# Patient Record
Sex: FEMALE | Race: WHITE | NOT HISPANIC OR LATINO | Employment: FULL TIME
[De-identification: names, ages, dates, MRNs, and addresses within clinical notes are randomized per-mention and may not be internally consistent; named-entity substitution may affect disease eponyms.]

---

## 2018-01-07 ENCOUNTER — HEALTH MAINTENANCE LETTER (OUTPATIENT)
Age: 53
End: 2018-01-07

## 2019-08-16 ENCOUNTER — HOSPITAL ENCOUNTER (EMERGENCY)
Facility: CLINIC | Age: 54
Discharge: HOME | End: 2019-08-16
Attending: EMERGENCY MEDICINE | Admitting: EMERGENCY MEDICINE
Payer: COMMERCIAL

## 2019-08-16 VITALS
HEIGHT: 64 IN | SYSTOLIC BLOOD PRESSURE: 133 MMHG | BODY MASS INDEX: 24.75 KG/M2 | TEMPERATURE: 98.4 F | HEART RATE: 85 BPM | OXYGEN SATURATION: 100 % | DIASTOLIC BLOOD PRESSURE: 82 MMHG | WEIGHT: 145 LBS | RESPIRATION RATE: 14 BRPM

## 2019-08-16 DIAGNOSIS — G43.801 OTHER MIGRAINE WITH STATUS MIGRAINOSUS, NOT INTRACTABLE: ICD-10-CM

## 2019-08-16 PROCEDURE — 25000128 H RX IP 250 OP 636: Performed by: EMERGENCY MEDICINE

## 2019-08-16 PROCEDURE — 96375 TX/PRO/DX INJ NEW DRUG ADDON: CPT

## 2019-08-16 PROCEDURE — 96361 HYDRATE IV INFUSION ADD-ON: CPT

## 2019-08-16 PROCEDURE — 99284 EMERGENCY DEPT VISIT MOD MDM: CPT | Mod: 25

## 2019-08-16 PROCEDURE — 96374 THER/PROPH/DIAG INJ IV PUSH: CPT

## 2019-08-16 RX ORDER — DEXAMETHASONE SODIUM PHOSPHATE 10 MG/ML
10 INJECTION, SOLUTION INTRAMUSCULAR; INTRAVENOUS ONCE
Status: COMPLETED | OUTPATIENT
Start: 2019-08-16 | End: 2019-08-16

## 2019-08-16 RX ORDER — DIPHENHYDRAMINE HYDROCHLORIDE 50 MG/ML
25 INJECTION INTRAMUSCULAR; INTRAVENOUS ONCE
Status: COMPLETED | OUTPATIENT
Start: 2019-08-16 | End: 2019-08-16

## 2019-08-16 RX ORDER — METOCLOPRAMIDE HYDROCHLORIDE 5 MG/ML
10 INJECTION INTRAMUSCULAR; INTRAVENOUS ONCE
Status: COMPLETED | OUTPATIENT
Start: 2019-08-16 | End: 2019-08-16

## 2019-08-16 RX ORDER — KETOROLAC TROMETHAMINE 15 MG/ML
15 INJECTION, SOLUTION INTRAMUSCULAR; INTRAVENOUS ONCE
Status: COMPLETED | OUTPATIENT
Start: 2019-08-16 | End: 2019-08-16

## 2019-08-16 RX ADMIN — DEXAMETHASONE SODIUM PHOSPHATE 10 MG: 10 INJECTION, SOLUTION INTRAMUSCULAR; INTRAVENOUS at 15:13

## 2019-08-16 RX ADMIN — METOCLOPRAMIDE 10 MG: 5 INJECTION, SOLUTION INTRAMUSCULAR; INTRAVENOUS at 15:13

## 2019-08-16 RX ADMIN — DIPHENHYDRAMINE HYDROCHLORIDE 25 MG: 50 INJECTION, SOLUTION INTRAMUSCULAR; INTRAVENOUS at 15:13

## 2019-08-16 RX ADMIN — SODIUM CHLORIDE 1000 ML: 9 INJECTION, SOLUTION INTRAVENOUS at 15:00

## 2019-08-16 RX ADMIN — KETOROLAC TROMETHAMINE 15 MG: 15 INJECTION, SOLUTION INTRAMUSCULAR; INTRAVENOUS at 15:13

## 2019-08-16 ASSESSMENT — ENCOUNTER SYMPTOMS
PHOTOPHOBIA: 0
FEVER: 0
HEADACHES: 1

## 2019-08-16 ASSESSMENT — MIFFLIN-ST. JEOR: SCORE: 1252.72

## 2019-08-16 NOTE — ED TRIAGE NOTES
"Patient reports she is \"having vertigo from a vestibular migraine.\" Patient states normally she can catch them early and lay down but she was at work when this started.   "

## 2019-08-16 NOTE — ED PROVIDER NOTES
"  History     Chief Complaint:  Headache and dizziness    HPI   Oral Singh is a 54 year old female, with a history of vestibular migraine, who presents with headache. The patient states that she had developed a migraine today while at work, but was unable to lay down, which usually resolves her headache, prompting her ED visit. Here, the patient endorses associated nausea, but denies any fever, photophobia or phonophobia, or changes in the presentation of her migraine. She states this is the exact same presentation as her previous migraines.      Allergies:  Ciprofloxacin  Sulfamethoxazole-Trimethoprim      Medications:    Meclizine   Estradiol cream   Amitriptyline  Ativan    Past Medical History:    Arthritis  Migraine  Vertigo  Condyloma acuminatum   Mitral valve prolapse  NIC elevated  Vulvar vestibulitis  Menorrhagia   Peptic ulcer   IBS    Past Surgical History:    GYN surgery   Knee surgery  [place] teeth extraction    Family History:    Diabetes  Hypertension  Asthma    Social History:  Positive for alcohol use.   Negative for tobacco use.   Marital Status:   [2]     Review of Systems   Constitutional: Negative for fever.   Eyes: Negative for photophobia.   Neurological: Positive for headaches.   All other systems reviewed and are negative.      Physical Exam   First Vitals:  BP: 133/82  Pulse: 85  Heart Rate: 85  Temp: 98.4  F (36.9  C)  Resp: 14  Height: 162.6 cm (5' 4\")  Weight: 65.8 kg (145 lb)  SpO2: 100 %    Physical Exam  General/Appearance: appears stated age, well-groomed, appears comfortable  Eyes: EOMI, no scleral injection, no icterus, horizontal nystagmus bilaterally  ENT: MMM  Neck: supple, nl ROM, no stiffness  Cardiovascular: RRR, nl S1S2, no m/r/g, 2+ pulses in all 4 extremities, cap refill <2sec  Respiratory: CTAB, good air movement throughout, no wheezes/rhonchi/rales, no increased WOB, no retractions  Back: no lesions  GI: abd soft, non-distended, nttp,  no HSM, no rebound, no " guarding, nl BS  MSK: PIZANO, good tone, no bony abnormality  Skin: warm and well-perfused, no rash, no edema, no ecchymosis, nl turgor  Neuro: GCS 15, alert and oriented, no gross focal neuro deficits -- CN 2-12 intact except for horizontal nystagmus, no other focal neuro deficit  Psych: interacts appropriately  Heme: no petechia, no purpura, no active bleeding      Emergency Department Course   Interventions:  1500 0.9% Sodium Chloride Bolus, 1L, IV   1513 Decadron, 10 mg, IV  1513 Reglan, 10 mg, IV  1513 Benadryl, 25 mg, IV  1513 Toradol, 15 mg, IV      Emergency Department Course:  Nursing notes and vitals reviewed.     1448  I performed an exam of the patient as documented above.     IV inserted. Medicine administered as documented above.     1612 I rechecked the patient and discussed the results of his workup thus far.     Findings and plan explained to the Patient. Patient discharged home with instructions regarding supportive care, medications, and reasons to return. The importance of close follow-up was reviewed.    Impression & Plan      Medical Decision Making:  This patient is a 54-year-old female with history of vestibular migraines who presents with the same.  There is nothing new or different about her presentation today.  She does have bilateral horizontal nystagmus.  Otherwise she neurologically is intact.  There is no recent head trauma to suggest bleed.  There is no fever, nuchal rigidity to suggest infection.  With the IV cocktail she received she is feeling markedly better and ready for discharge.  Diagnosis:    ICD-10-CM   1. Other migraine with status migrainosus, not intractable G43.801       Disposition:  discharged to home    IRenetta, am serving as a scribe on 8/16/2019 at 2:48 PM to personally document services performed by Lakia Jamil MD based on my observations and the provider's statements to me.      Renetta Duran  8/16/2019    EMERGENCY DEPARTMENT        Lakia Jamil MD  08/16/19 3180

## 2019-08-16 NOTE — ED AVS SNAPSHOT
Emergency Department  [address] [place] [address]  Phone:  [phone]  Fax:  [phone]                                    Oral Singh   MRN: [id number]    Department:   Emergency Department   Date of Visit:  8/16/2019           After Visit Summary Signature Page    I have received my discharge instructions, and my questions have been answered. I have discussed any challenges I see with this plan with the nurse or doctor.    ..........................................................................................................................................  Patient/Patient Representative Signature      ..........................................................................................................................................  Patient Representative Print Name and Relationship to Patient    ..................................................               ................................................  Date                                   Time    ..........................................................................................................................................  Reviewed by Signature/Title    ...................................................              ..............................................  Date                                               Time          22EPIC Rev 08/18

## 2019-09-13 ENCOUNTER — HOSPITAL ENCOUNTER (EMERGENCY)
Facility: CLINIC | Age: 54
Discharge: HOME | End: 2019-09-13
Attending: EMERGENCY MEDICINE | Admitting: EMERGENCY MEDICINE
Payer: COMMERCIAL

## 2019-09-13 VITALS
RESPIRATION RATE: 14 BRPM | TEMPERATURE: 97.9 F | BODY MASS INDEX: 25.44 KG/M2 | WEIGHT: 149 LBS | HEART RATE: 84 BPM | DIASTOLIC BLOOD PRESSURE: 85 MMHG | HEIGHT: 64 IN | SYSTOLIC BLOOD PRESSURE: 141 MMHG | OXYGEN SATURATION: 100 %

## 2019-09-13 DIAGNOSIS — H55.00 NYSTAGMUS: ICD-10-CM

## 2019-09-13 DIAGNOSIS — Z86.69 H/O MIGRAINE: ICD-10-CM

## 2019-09-13 DIAGNOSIS — R42 VERTIGO: ICD-10-CM

## 2019-09-13 PROCEDURE — 99284 EMERGENCY DEPT VISIT MOD MDM: CPT | Mod: 25

## 2019-09-13 PROCEDURE — 25000128 H RX IP 250 OP 636: Performed by: EMERGENCY MEDICINE

## 2019-09-13 PROCEDURE — 96374 THER/PROPH/DIAG INJ IV PUSH: CPT

## 2019-09-13 PROCEDURE — 96375 TX/PRO/DX INJ NEW DRUG ADDON: CPT

## 2019-09-13 PROCEDURE — 96361 HYDRATE IV INFUSION ADD-ON: CPT

## 2019-09-13 RX ORDER — DIPHENHYDRAMINE HYDROCHLORIDE 50 MG/ML
50 INJECTION INTRAMUSCULAR; INTRAVENOUS ONCE
Status: COMPLETED | OUTPATIENT
Start: 2019-09-13 | End: 2019-09-13

## 2019-09-13 RX ORDER — KETOROLAC TROMETHAMINE 30 MG/ML
30 INJECTION, SOLUTION INTRAMUSCULAR; INTRAVENOUS ONCE
Status: COMPLETED | OUTPATIENT
Start: 2019-09-13 | End: 2019-09-13

## 2019-09-13 RX ORDER — METOCLOPRAMIDE HYDROCHLORIDE 5 MG/ML
10 INJECTION INTRAMUSCULAR; INTRAVENOUS ONCE
Status: COMPLETED | OUTPATIENT
Start: 2019-09-13 | End: 2019-09-13

## 2019-09-13 RX ORDER — DEXAMETHASONE SODIUM PHOSPHATE 10 MG/ML
10 INJECTION, SOLUTION INTRAMUSCULAR; INTRAVENOUS ONCE
Status: COMPLETED | OUTPATIENT
Start: 2019-09-13 | End: 2019-09-13

## 2019-09-13 RX ADMIN — KETOROLAC TROMETHAMINE 30 MG: 30 INJECTION, SOLUTION INTRAMUSCULAR at 12:25

## 2019-09-13 RX ADMIN — METOCLOPRAMIDE 10 MG: 5 INJECTION, SOLUTION INTRAMUSCULAR; INTRAVENOUS at 12:25

## 2019-09-13 RX ADMIN — SODIUM CHLORIDE 1000 ML: 9 INJECTION, SOLUTION INTRAVENOUS at 12:26

## 2019-09-13 RX ADMIN — DEXAMETHASONE SODIUM PHOSPHATE 10 MG: 10 INJECTION, SOLUTION INTRAMUSCULAR; INTRAVENOUS at 12:25

## 2019-09-13 RX ADMIN — DIPHENHYDRAMINE HYDROCHLORIDE 50 MG: 50 INJECTION, SOLUTION INTRAMUSCULAR; INTRAVENOUS at 12:25

## 2019-09-13 ASSESSMENT — ENCOUNTER SYMPTOMS
VOMITING: 1
SHORTNESS OF BREATH: 0
HEADACHES: 0
DIZZINESS: 1

## 2019-09-13 ASSESSMENT — MIFFLIN-ST. JEOR: SCORE: 1270.86

## 2019-09-13 NOTE — ED PROVIDER NOTES
History     Chief Complaint:    Dizziness    HPI   Oral Singh is a 54 year old female who presents with dizziness she feels is consistent with her typical vestibular migraine. The patient reports that for the past 5 years she has been experiencing intermittent, random vestibular migraines that she describes as vertigo symptoms with dizziness and nystagmus. She notes that she does not get a headache with her symptoms ever and has been following [facility] of which she has had several reassuring MRI's in the past few year, one most recently this summer. In the past 1-2 months she has been previously evaluated in the emergency department for episodes and she just finished a tapering course of Prednisone. The patient has been in follow up and contact with the [facility]. The patient states that today she is presenting for another episode that is uncontrolled with at home medications. Last night she took Excedrin as well as Meclizine. She continued to take Meclizine today, however, had an episode of emesis shortly after taking it 1 hour prior to arrival. The patient denies loss of vision, shortness of breath, chest pain, or any new symptoms that are atypical from her normal episodes.     Allergies:  Ciprofloxacin  Sulfamethoxazole-Trimethoprim      Medications:    Meczline  Estrace Vaginal     Past Medical History:    Vestibular migraines   Vertigo   Left ear deafness   osteoarthrosis   Murmur  Mitral valve prolapse  Menorrhagia   Peptic ulcer  IBS  Left thyroid nodule    Past Surgical History:    GYN surgery- uterine ablation   Knee arthroscopy  [place] teeth     Family History:    Family history reviewed. No pertinent family history.     Social History:  The patient was unaccompanied to the ED.  Smoking Status: Never Smoker  Smokeless Tobacco: Never Used  Alcohol Use: Positive  Drug Use: Negative  PCP: Brock Ortega   Marital Status:        Review of Systems   Eyes: Negative for visual  "disturbance.        Nystagmus   Respiratory: Negative for shortness of breath.    Cardiovascular: Negative for chest pain.   Gastrointestinal: Positive for vomiting.   Neurological: Positive for dizziness. Negative for headaches.   All other systems reviewed and are negative.    Physical Exam     Patient Vitals for the past 24 hrs:   BP Temp Temp src Pulse Heart Rate Resp SpO2 Height Weight   09/13/19 1300 (!) 141/85 -- -- 84 78 14 100 % -- --   09/13/19 1150 (!) 143/89 97.9  F (36.6  C) Temporal 91 -- 20 100 % 1.626 m (5' 4\") 67.6 kg (149 lb)      Physical Exam  General:Adult female sitting upright  Eyes: PERRL, Conjunctive within normal limits. EOMI. Horizontal nystagmus.  ENT: Moist mucous membranes, oropharynx clear.   Neck: no rigidity. Holds head cocked to the right. Nontender.  CV: Normal S1S2, no murmur, rub or gallop. Regular rate and rhythm  Resp: Clear to auscultation bilaterally. Normal respiratory effort.  GI: Abdomen is soft, nontender and nondistended.   MSK: No edema. Nontender. Normal active range of motion.  Skin: Warm and dry. No rashes or lesions or ecchymoses on visible skin.  Neuro: Alert and oriented. Responds appropriately to all questions and commands. No focal findings appreciated. Normal muscle tone. Hearing intact right ear (deaf on left). No facial asymmetry. Moves all extremities equally. Speech intact.   Psych: Normal mood and affect.    Emergency Department Course     Interventions:  Medications   0.9% sodium chloride BOLUS (1,000 mLs Intravenous New Bag 9/13/19 1226)   diphenhydrAMINE (BENADRYL) injection 50 mg (50 mg Intravenous Given 9/13/19 1225)   dexamethasone PF (DECADRON) injection 10 mg (10 mg Intravenous Given 9/13/19 1225)   ketorolac (TORADOL) injection 30 mg (30 mg Intravenous Given 9/13/19 1225)   metoclopramide (REGLAN) injection 10 mg (10 mg Intravenous Given 9/13/19 1225)      Emergency Department Course:    1206 Nursing notes and vitals reviewed. I performed an exam " of the patient as documented above.     Medications administered as above.    1316 Patient rechecked and updated.  The patient feels improved and is ready to go home. Prior to discharge, I personally reviewed the results with the patient and all related questions were answered. The patient verbalized understanding and is amenable to plan.     Impression & Plan      Medical Decision Making:  Oral Singh is a 54 year old female with a history of vestibular migraines managed by [facility] who presents to the emergency department today for evaluation of symptoms consistent with her previous vestibular migraines. She has no neurologic deficit. She has noted nystagmus here. She has no associated headache which is typical for her vestibular migraines. She felt better after intervention here and I feel comfortable discharging her to home. We seem unlikely to be missing acute CNS pathology. She should return if symptoms worsen or if atypical symptoms develop. Follow up with [facility]. All questions were answered prior to discharge.     Diagnosis:    ICD-10-CM    1. Vertigo R42    2. Nystagmus H55.00    3. H/O migraine Z86.69      Disposition:   The patient is discharged to home.     Discharge Medications:    No discharge medications.    Scribe Disclosure:  I, Orla Severson, am serving as a scribe at 12:09 PM on 9/13/2019 to document services personally performed by Dayana Jaime MD based on my observations and the provider's statements to me.    [facility] EMERGENCY DEPARTMENT       Dayana Jaime MD  09/14/19 0791

## 2019-09-13 NOTE — DISCHARGE INSTRUCTIONS
Based on your history, this seems most consistent with vestibular migraine. Return with worsening or atypical symptoms. Follow-up with your neurologist.

## 2019-09-13 NOTE — ED NOTES
PIV removed, VSS, Pt verbalized understanding of discharge instructions and ambulated to lobby with steady gait.

## 2019-09-13 NOTE — ED AVS SNAPSHOT
[facility] Emergency Department  [address]  [facility] [address]  Phone:  [phone]  Fax:  [phone]                                    Oral Singh   MRN: [id number]    Department:  [facility] Emergency Department   Date of Visit:  9/13/2019           After Visit Summary Signature Page    I have received my discharge instructions, and my questions have been answered. I have discussed any challenges I see with this plan with the nurse or doctor.    ..........................................................................................................................................  Patient/Patient Representative Signature      ..........................................................................................................................................  Patient Representative Print Name and Relationship to Patient    ..................................................               ................................................  Date                                   Time    ..........................................................................................................................................  Reviewed by Signature/Title    ...................................................              ..............................................  Date                                               Time          22EPIC Rev 08/18

## 2019-09-13 NOTE — ED TRIAGE NOTES
Patient complaining of migraine headache for one month.  States she has been to ED three times and [facility] clinic once with no relief.      States they are vestibular migraines so no pain only severe dizziness.      ABCs intact.  Alert and oriented x 3.

## 2019-09-13 NOTE — LETTER
September 13, 2019      To Whom It May Concern:      Oral Singh was seen in our Emergency Department today, 09/13/19.  I expect her condition to improve over the next 1-2 days.  She may return to work when improved.    Sincerely,        Renetta Hood RN

## 2020-03-10 ENCOUNTER — HEALTH MAINTENANCE LETTER (OUTPATIENT)
Age: 55
End: 2020-03-10

## 2020-05-04 ENCOUNTER — TRANSFERRED RECORDS (OUTPATIENT)
Dept: HEALTH INFORMATION MANAGEMENT | Facility: CLINIC | Age: 55
End: 2020-05-04

## 2020-05-05 LAB
ALT SERPL-CCNC: 15 U/L (ref 7–45)
AST SERPL-CCNC: 16 U/L (ref 8–43)
CREAT SERPL-MCNC: 0.98 MG/DL (ref 0.5–1)
GFR SERPL CREATININE-BSD FRML MDRD: 67 ML/MIN/BSA
GLUCOSE SERPL-MCNC: 95 MG/DL (ref 70–100)
POTASSIUM SERPL-SCNC: 4.5 MMOL/L (ref 3.6–5.2)
TSH SERPL-ACNC: 4.9 MIU/L (ref 0.3–4.2)

## 2020-09-30 ENCOUNTER — TRANSFERRED RECORDS (OUTPATIENT)
Dept: HEALTH INFORMATION MANAGEMENT | Facility: CLINIC | Age: 55
End: 2020-09-30

## 2020-11-17 ENCOUNTER — TRANSFERRED RECORDS (OUTPATIENT)
Dept: HEALTH INFORMATION MANAGEMENT | Facility: CLINIC | Age: 55
End: 2020-11-17

## 2020-11-19 ENCOUNTER — TRANSFERRED RECORDS (OUTPATIENT)
Dept: HEALTH INFORMATION MANAGEMENT | Facility: CLINIC | Age: 55
End: 2020-11-19

## 2020-12-27 ENCOUNTER — HEALTH MAINTENANCE LETTER (OUTPATIENT)
Age: 55
End: 2020-12-27

## 2021-01-07 ENCOUNTER — TRANSFERRED RECORDS (OUTPATIENT)
Dept: HEALTH INFORMATION MANAGEMENT | Facility: CLINIC | Age: 56
End: 2021-01-07

## 2021-03-16 ENCOUNTER — DOCUMENTATION ONLY (OUTPATIENT)
Dept: CARE COORDINATION | Facility: CLINIC | Age: 56
End: 2021-03-16

## 2021-04-04 ENCOUNTER — PRE VISIT (OUTPATIENT)
Dept: NEUROLOGY | Facility: CLINIC | Age: 56
End: 2021-04-04

## 2021-04-04 ASSESSMENT — ENCOUNTER SYMPTOMS
HEADACHES: 1
TREMORS: 0
SWOLLEN GLANDS: 1
TROUBLE SWALLOWING: 0
MYALGIAS: 1
NECK PAIN: 1
MEMORY LOSS: 1
HOARSE VOICE: 0
SINUS PAIN: 0
STIFFNESS: 1
DIZZINESS: 1
NECK MASS: 1
BRUISES/BLEEDS EASILY: 0
MUSCLE CRAMPS: 0
TINGLING: 0
MUSCLE WEAKNESS: 1
SMELL DISTURBANCE: 0
NUMBNESS: 0
SINUS CONGESTION: 0
SEIZURES: 0
PARALYSIS: 0
DISTURBANCES IN COORDINATION: 0
LOSS OF CONSCIOUSNESS: 1
TASTE DISTURBANCE: 0
WEAKNESS: 1
SPEECH CHANGE: 1
BACK PAIN: 1
JOINT SWELLING: 0
ARTHRALGIAS: 1
SORE THROAT: 0

## 2021-04-04 NOTE — TELEPHONE ENCOUNTER
FUTURE VISIT INFORMATION      FUTURE VISIT INFORMATION:    Date: 4/7/2021    Time: 230pm    Location: [facility]  REFERRAL INFORMATION:    Referring provider:  Self     Referring providers clinic:      Reason for visit/diagnosis  Vertigo    RECORDS REQUESTED FROM:       Clinic name Comments Records Status Imaging Status   Internal ED Visit-9/13/2019 Epic N/A          [facility]  ED Visit-8/22/2019 Care Everywhere N/A         [facility]-9/30/2020    Dr. Avelar-10/7/2020    Dr. Goddard-5/22/2020    Dr. Madrid-5/4/2020 Care EVerywhere Requested to PACS            4/4/2021-Request for images faxed to [facility]-MR @ 761nm    4/7/2021-[facility] images now in PACS-MR @ 847am

## 2021-04-07 ENCOUNTER — OFFICE VISIT (OUTPATIENT)
Dept: NEUROLOGY | Facility: CLINIC | Age: 56
End: 2021-04-07
Payer: COMMERCIAL

## 2021-04-07 VITALS
BODY MASS INDEX: 27.49 KG/M2 | RESPIRATION RATE: 16 BRPM | OXYGEN SATURATION: 99 % | DIASTOLIC BLOOD PRESSURE: 89 MMHG | HEART RATE: 95 BPM | SYSTOLIC BLOOD PRESSURE: 128 MMHG | WEIGHT: 161 LBS | HEIGHT: 64 IN

## 2021-04-07 DIAGNOSIS — G54.0 TOS (THORACIC OUTLET SYNDROME): ICD-10-CM

## 2021-04-07 DIAGNOSIS — H81.02 ENDOLYMPHATIC HYDROPS OF LEFT EAR: ICD-10-CM

## 2021-04-07 DIAGNOSIS — R42 VERTIGO: Primary | ICD-10-CM

## 2021-04-07 DIAGNOSIS — I87.1 COMPRESSION OF VEIN: ICD-10-CM

## 2021-04-07 PROCEDURE — 99205 OFFICE O/P NEW HI 60 MIN: CPT | Performed by: PSYCHIATRY & NEUROLOGY

## 2021-04-07 RX ORDER — PROMETHAZINE HYDROCHLORIDE 25 MG/1
TABLET ORAL
Qty: 30 TABLET | Refills: 3 | Status: SHIPPED | OUTPATIENT
Start: 2021-04-07 | End: 2022-11-18

## 2021-04-07 RX ORDER — TOPIRAMATE SPINKLE 25 MG/1
50 CAPSULE ORAL 2 TIMES DAILY
COMMUNITY
End: 2022-11-18

## 2021-04-07 RX ORDER — ACETAMINOPHEN 500 MG
500 TABLET ORAL
COMMUNITY
Start: 2019-10-31 | End: 2022-09-23

## 2021-04-07 RX ORDER — TOPIRAMATE 25 MG/1
TABLET, FILM COATED ORAL
Qty: 120 TABLET | Refills: 3 | Status: SHIPPED | OUTPATIENT
Start: 2021-04-07 | End: 2021-04-07

## 2021-04-07 RX ORDER — SUMATRIPTAN 50 MG/1
TABLET, FILM COATED ORAL PRN
COMMUNITY
Start: 2016-12-23 | End: 2023-10-27

## 2021-04-07 RX ORDER — ONDANSETRON 4 MG/1
4 TABLET, ORALLY DISINTEGRATING ORAL
COMMUNITY
Start: 2019-10-31 | End: 2022-11-18

## 2021-04-07 RX ORDER — CHLORAL HYDRATE 500 MG
2 CAPSULE ORAL
COMMUNITY
Start: 2018-01-01

## 2021-04-07 RX ORDER — BACLOFEN 20 MG
500 TABLET ORAL
COMMUNITY
Start: 2016-12-22 | End: 2022-11-18

## 2021-04-07 RX ORDER — VIT C/B6/B5/MAGNESIUM/HERB 173 50-5-6-5MG
CAPSULE ORAL
COMMUNITY
End: 2023-10-19

## 2021-04-07 RX ORDER — NAPROXEN SODIUM 220 MG
220 TABLET ORAL PRN
COMMUNITY
Start: 2019-10-31 | End: 2022-05-09

## 2021-04-07 RX ORDER — CYCLOBENZAPRINE HCL 5 MG
TABLET ORAL
COMMUNITY
Start: 2020-02-18 | End: 2022-05-09

## 2021-04-07 ASSESSMENT — MIFFLIN-ST. JEOR: SCORE: 1320.29

## 2021-04-07 ASSESSMENT — PAIN SCALES - GENERAL: PAINLEVEL: NO PAIN (0)

## 2021-04-07 NOTE — Clinical Note
4/7/2021       RE: Oral Singh  [address] [place] [address]     Dear Colleague,    Thank you for referring your patient, Oral Singh, to the [facility]. Please see a copy of my visit note below.    No notes on file    Again, thank you for allowing me to participate in the care of your patient.      Sincerely,    Toshia CAMARILLO Cha, MD

## 2021-04-07 NOTE — NURSING NOTE
Chief Complaint   Patient presents with     Consult     [facility] New     Dizziness     Vertigo     Cl Dotson

## 2021-04-07 NOTE — LETTER
"4/7/2021       RE: Oral Singh  [address] [place] [address]     Dear Colleague,    Thank you for referring your patient, Oral Singh, to the [facility] [place] [facility]. Please see a copy of my visit note below.    [facility]    Neurology Consult    4/7/2021      Oral Singh MRN# [id number]   YOB: 1965 Age: 55 year old      Primary care provider:   [facility] [place]    Requesting provider:   None    Reason for Consult:  Episodic vertigo and chronic dizziness    IMPRESSIONS:  Oral Singh is a 55 year old female with PMH significant for left ear deafness with episodes of vertigo with now head triggered symptoms of lightheadedness and \"whooshing,\" sensations.  Her rotational vertigo episodes seem to have been controlled with topiramate but she has a chronic sensation that her vertigo episode might start.  She also has chronic daily pressure headaches and neck pain.  She has chronic bilateral upper extremity weakness.  There is some chronic swelling in the hands but also in the feet more recently.  Paresthesias in the hand and the headache have been worse on the right side by history but on exam today paresthesias are worse in the left hand.  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  If involving the venous system this can also lead to chronic neck pain and headache.  With the head movement triggered symptoms I would also be concerned about internal jugular vein narrowing.      For diagnostic testing we will have her obtain a ultrasound of the internal jugular veins and thoracic outlet.  I have given her instructions on the number to call to schedule this test.    Symptomatically, treatment with a " carbonic anhydrase inhibitor with theoretically be the most helpful.  This may be why topiramate has worked well for her.  Given that she is generally tolerating it well we can titrate up the topiramate by adding a morning dose.  If she is able to titrate up on the topiramate we will stick with it.  However, if cognitive side effects are limiting then we will switch her over to acetazolamide.  She would also benefit from more effective antinausea treatment during both acute spells of vertigo and when she starts to feel that a vertigo spell is coming on.  While she is able to still swallow a pill she can take oral promethazine which I have prescribed.  I will also have a compounding pharmacy make promethazine cream for her that she can use when her vertigo spell is about to come on. It may suppress her nausea enough that she might be able to take an oral dose of the vestibular suppressant at that time.    Plan:  1.  Titrate up topiramate by adding 25 mg in the morning.  We will gradually titrate up as tolerated to a top dose of 100 mg twice daily  2.  Trial of promethazine oral tablets as well as topical cream  3.  Ultrasound internal jugular veins and thoracic outlet  4.  Follow-up with appropriate anatomical imaging with either CT venogram of the head and neck or CT chest depending on ultrasound findings  5.  Refer to physical therapy after ultrasound  6.  Follow-up after imaging completed    HISTORY OF PRESENT ILLNESS:  Oral Singh is a 55 year old female with a past medical history of left ear deafness with episodes of vertigo since May 10, 2014. Prior to that date, she had intermittent less severe dizziness.     The patient was getting ready for work on the morning of 5- when she experienced severe rotational vertigo along with nausea and vomiting.  She notes both feeling and observing nystagmus in her eyes.  She was sweaty, shaking, and very imbalanced.  She went to the emergency room at which  "time she was given some cocktail of medications that eventually stopped the vertigo.  She is unsure what the trigger for the vertigo was.    Since that initial episode she has had 14 episodes of severe vertigo that required trips to the emergency room.  Once she has the rotational vertigo she cannot stop it on her own and thus every major vertigo spell has ended up with a trip to the emergency room.  She has not however been treated with vestibular suppressants like promethazine or diazepam at home.  She had not found meclizine to be sufficient.  Of note she was started on topiramate in October 2019 and has titrated up to 75 mg at night.  She had previously tried amitriptyline which was not helpful. Though she still suffers from severe dizziness and a feeling that she may have a vertigo spell start, she has not had the hours long vertigo spell since starting the topiramate.    The patient often feels that a spell of vertigo might be coming on and she may spend days trying to stave off the vertigo by avoiding head movements and trying to stay calm.    The patient notes a more chronic feeling of lightheadedness and feeling like there are \"whooshes\" in her head.  She has a difficult time precisely describing that feeling, however.  She does note that this whooshing feeling of her head and lightheadedness can be triggered by even very small amounts of head movement.  The head movement can be in any direction but she does note that there was a body movement of turning from right to left that had very abruptly started a spell of vertigo before. The patient notes that her spells usually had occurred in the middle of the day.      She denies ear pressure.  She has had high-pitched tinnitus in the left ear on and off for the last 2 years.  She is not aware of any head position that makes the tinnitus louder.  She has had baseline hearing loss in her left ear that was diagnosed when she was 7 years old.  There is no " "additional hearing loss during a vertigo spell.    Patient has had bilateral upper extremity weakness for the last 4 to 5 years.  She had difficulty holding grocery bags.  There is some numbness in her fingertips that comes and goes.  The right side is worse than the left side.  She denies any pain in the arms. She does note that when she washes her hair that her arms will get quite tired.  She notes her hands feeling constantly cold.  There has been no color change.  Over the last few years she has noticed some swelling in both her hands when her hands are down while she is walking.  There is also some swelling in her ankles bilaterally which she had noted a few months ago.  The swelling in the hands is worse than in the legs.  She works in an office doing mostly computer work.  She denies any chest pain.  She does experience shortness of breath with exertion.    She notes a chronic frontal headache that is there 24/7.  For about 7 years she is also had pressure in the head.  She does feel better laying down and the headache is generally better in the morning.  Over the last 6 months the headache has been predominantly over the right posterior parietal area. She also notes 7 to 8 years of neck pain on the posterior aspect of the neck.  There is no associated shoulder pain.     She has had 3-4 episodes of visual changes lasting about 30 seconds that were associated with bending over and turning.  One episode involved seeing recurring purple lines and another episode involved seeing white spots.  The visual changes occurred in both eyes.  She has not had a classic migraine headache with throbbing, nausea, and hypersensitivities. She notes decades of forgetfulness described as brain fog.    She was evaluated at the [facility] and has seen Mirella Rodriguez, Mick Rey, and Douglas Goddard in visits through 2020.  Per Dr. Barbosa's 6-29-20 note:  \"I reviewed her MRI of the brain without gadolinium from 08/30/2019 which " "appears to show some mild small vessel ischemic changes in the bilateral subcortical white matter. Her audiogram from 05/05/2020 shows left profound sensorineural hearing loss. Vestibular evaluation on 06/10/2020 reveals a DHI of 44 and PHQ 4 of 1+1. The vestibular test battery was all normal. \"  She was diagnosed with benign recurrent vertigo with a recommendation to increase her existing topiramate dose from 50 mg to 75 mg.    By the patient's request she was evaluated for CSF leak.  She had nasal pledgets checked for beta-2 transferrin signifying leak which were negative on 11-19-20.  She had a nuclear medicine cisternogram on 1-8-21 that was negative.    PAST MEDICAL HISTORY:  Past Medical History:   Diagnosis Date     Arthritis       PAST SURGICAL HISTORY:  Past Surgical History:   Procedure Laterality Date     GYN SURGERY       SOCIAL HISTORY: , never smoker,     FAMILY HISTORY: neg, neurological, migraine, vertigo, hearing loss     ALLERGIES:  Allergies   Allergen Reactions     Ciprofloxacin Muscle Pain (Myalgia)     Sulfamethoxazole-Trimethoprim Muscle Pain (Myalgia)     MEDICATIONS:    Current Outpatient Medications:      acetaminophen (TYLENOL) 500 MG tablet, Take 500 mg by mouth, Disp: , Rfl:      cholecalciferol 125 MCG (5000 UT) CAPS, Take 5,000 Units by mouth, Disp: , Rfl:      cyclobenzaprine (FLEXERIL) 5 MG tablet, , Disp: , Rfl:      estradiol (ESTRACE VAGINAL) 0.1 MG/GM vaginal cream, , Disp: , Rfl:      fish oil-omega-3 fatty acids 1000 MG capsule, Take 2 g by mouth, Disp: , Rfl:      Magnesium Oxide 500 MG TABS, Take 500 mg by mouth, Disp: , Rfl:      melatonin 5 MG tablet, Take 5 mg by mouth, Disp: , Rfl:      Multiple Vitamins-Minerals (CENTRUM SILVER 50+WOMEN) TABS, daily, Disp: , Rfl:      naproxen sodium (ANAPROX) 220 MG tablet, Take 220 mg by mouth as needed, Disp: , Rfl:      ondansetron (ZOFRAN-ODT) 4 MG ODT tab, Place 4 mg under the tongue, Disp: , Rfl:      " "SUMAtriptan (IMITREX) 50 MG tablet, as needed, Disp: , Rfl:      topiramate (TOPAMAX) 25 MG capsule, 75 mg daily, Disp: , Rfl:      Turmeric 500 MG CAPS, , Disp: , Rfl:      MECLIZINE HCL PO, Take 25 mg by mouth 3 times daily as needed for dizziness, Disp: , Rfl:      PHYSICAL EXAM:  Vitals:  /89   Pulse 95   Resp 16   Ht 1.626 m (5' 4\")   Wt 73 kg (161 lb)   SpO2 99%   BMI 27.64 kg/m      Wt Readings from Last 3 Encounters:   09/13/19 67.6 kg (149 lb)   08/16/19 65.8 kg (145 lb)   09/06/16 72.1 kg (159 lb)     General: Patient is well-nourished, well-groomed, in no apparent distress    HEENT: Head is atraumatic, eyes look normal exteriorly, throat clear, neck supple.  Ext: Warm, well-perfused. No edema.    Neurologic:  Mental Status: Alert and oriented to person, place, time, and situation.  Able to provide an excellent history.     Cranial Nerves: Visual fields full to confrontation. Extraocular movements full.  Face sensation normal.  Normal head impulse testing.  Normal hearing to finger rub on the right.  Hearing absent on the left. Face symmetric with normal movements. Tongue and palate midline.  Normal shoulder elevation.      Motor: Normal bulk and tone.  No pronator drift.  Normal foot taps.  Full strength to confrontational testing.    Reflexes: Biceps, Brachioradialis, Triceps, Knees, Ankles 2/4.     Coordination: Normal finger to nose, rapid alternating movements    Gait: Normal stance width.  Negative Romberg.  Good gait initiation.  Good stride length.  Good arm swing.  Normal turn. Able to walk 5 steps in tandem.       Adson's test for Thoracic Outlet Syndrome:  Arms abducted: Negative   Aarms abducted head turned to the RIGHT:   Right hand no symptoms   Left hand gets worse  Arms abducted head turned to the LEFT:   Left hand gets better   Right hand no symptoms    No radiation of pain with palpation under the clavicles.     DATA:  All available and relevant labs, imaging, and other " procedures were reviewed personally.   Last brain imagin21: [place]  EXAM:  NM CSF CISTERNOGRAM        RADIOPHARMACEUTICAL/MEDS:     Route: intrathecal    pentetate indium disodium In 111 (DTPA In-111),0.5 millicurie        TECHNIQUE:  Injection of In-111 DTPA into the lumbar subarachnoid space with    imaging of the entire CNS axis at multiple time points up to 24 hours after    injection.        COMPARISON:  MRI entire spine 2020. MRI brain 2020.        INDICATION:  Orthostatic headache, persistent rhinitis, episodic vertigo. Assess    for evidence of CSF leak.        FINDINGS:      Radiotracer activity ascends to the basal cisterns by 6 hours and surrounds the    cerebral convexities by 24 hours. No evidence of CSF leak. Immediate    visualization of bladder activity, likely injection related.    Reviewed. Cisternogram showed normal opening pressure and routine analysis was essentially normal with the exception of a mildly elevated protein which I do not believe is of clinical significance. The cisternogram was normal without evidence of CSF leak. At present I conclude she does not have a spontaneous spine CSF leak. Letter sent to the patient.  OP=13.5cmH20    Last audiogram: 20 per [place], left profound hearing loss    Last ENG/VN-10- per [place], normal    Last Labs:  CMPNo results for input(s): NA, POTASSIUM, CHLORIDE, CO2, ANIONGAP, GLC, BUN, CR, GFRESTIMATED, GFRESTBLACK, JOSE GUADALUPE, MAG, PHOS, PROTTOTAL, ALBUMIN, BILITOTAL, ALKPHOS, AST, ALT in the last 07985 hours.  CBCNo results for input(s): HGB, WBC, RBC, HCT, MCV, MCH, MCHC, RDW, PLT in the last 31620 hours.  INRNo results for input(s): INR, PTT in the last 60913 hours.    60-minutes were spent in evaluation, examination, and documentation.    Again, thank you for allowing me to participate in the care of your patient.      Sincerely,    Toshia CAMARILLO Cha, MD

## 2021-04-07 NOTE — PROGRESS NOTES
"[facility]    Neurology Consult    4/7/2021      Oral Singh MRN# [id number]   YOB: 1965 Age: 55 year old      Primary care provider:   Karo, Grzegorz Upton    Requesting provider:   None    Reason for Consult:  Episodic vertigo and chronic dizziness    IMPRESSIONS:  Oral Singh is a 55 year old female with PMH significant for left ear deafness with episodes of vertigo with now head triggered symptoms of lightheadedness and \"whooshing,\" sensations.  Her rotational vertigo episodes seem to have been controlled with topiramate but she has a chronic sensation that her vertigo episode might start.  She also has chronic daily pressure headaches and neck pain.  She has chronic bilateral upper extremity weakness.  There is some chronic swelling in the hands but also in the feet more recently.  Paresthesias in the hand and the headache have been worse on the right side by history but on exam today paresthesias are worse in the left hand.  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  If involving the venous system this can also lead to chronic neck pain and headache.  With the head movement triggered symptoms I would also be concerned about internal jugular vein narrowing.      For diagnostic testing we will have her obtain a ultrasound of the internal jugular veins and thoracic outlet.  I have given her instructions on the number to call to schedule this test.    Symptomatically, treatment with a carbonic anhydrase inhibitor with theoretically be the most helpful.  This may be why topiramate has worked well for her.  Given that she is generally tolerating it well we can titrate up the topiramate by adding a morning dose.  If she is able to titrate up on the topiramate we will stick with it.  However, if cognitive " side effects are limiting then we will switch her over to acetazolamide.  She would also benefit from more effective antinausea treatment during both acute spells of vertigo and when she starts to feel that a vertigo spell is coming on.  While she is able to still swallow a pill she can take oral promethazine which I have prescribed.  I will also have a compounding pharmacy make promethazine cream for her that she can use when her vertigo spell is about to come on. It may suppress her nausea enough that she might be able to take an oral dose of the vestibular suppressant at that time.    Plan:  1.  Titrate up topiramate by adding 25 mg in the morning.  We will gradually titrate up as tolerated to a top dose of 100 mg twice daily  2.  Trial of promethazine oral tablets as well as topical cream  3.  Ultrasound internal jugular veins and thoracic outlet  4.  Follow-up with appropriate anatomical imaging with either CT venogram of the head and neck or CT chest depending on ultrasound findings  5.  Refer to physical therapy after ultrasound  6.  Follow-up after imaging completed    HISTORY OF PRESENT ILLNESS:  Oral Singh is a 55 year old female with a past medical history of left ear deafness with episodes of vertigo since May 10, 2014. Prior to that date, she had intermittent less severe dizziness.     The patient was getting ready for work on the morning of 5- when she experienced severe rotational vertigo along with nausea and vomiting.  She notes both feeling and observing nystagmus in her eyes.  She was sweaty, shaking, and very imbalanced.  She went to the emergency room at which time she was given some cocktail of medications that eventually stopped the vertigo.  She is unsure what the trigger for the vertigo was.    Since that initial episode she has had 14 episodes of severe vertigo that required trips to the emergency room.  Once she has the rotational vertigo she cannot stop it on her own and  "thus every major vertigo spell has ended up with a trip to the emergency room.  She has not however been treated with vestibular suppressants like promethazine or diazepam at home.  She had not found meclizine to be sufficient.  Of note she was started on topiramate in October 2019 and has titrated up to 75 mg at night.  She had previously tried amitriptyline which was not helpful. Though she still suffers from severe dizziness and a feeling that she may have a vertigo spell start, she has not had the hours long vertigo spell since starting the topiramate.    The patient often feels that a spell of vertigo might be coming on and she may spend days trying to stave off the vertigo by avoiding head movements and trying to stay calm.    The patient notes a more chronic feeling of lightheadedness and feeling like there are \"whooshes\" in her head.  She has a difficult time precisely describing that feeling, however.  She does note that this whooshing feeling of her head and lightheadedness can be triggered by even very small amounts of head movement.  The head movement can be in any direction but she does note that there was a body movement of turning from right to left that had very abruptly started a spell of vertigo before. The patient notes that her spells usually had occurred in the middle of the day.      She denies ear pressure.  She has had high-pitched tinnitus in the left ear on and off for the last 2 years.  She is not aware of any head position that makes the tinnitus louder.  She has had baseline hearing loss in her left ear that was diagnosed when she was 7 years old.  There is no additional hearing loss during a vertigo spell.    Patient has had bilateral upper extremity weakness for the last 4 to 5 years.  She had difficulty holding grocery bags.  There is some numbness in her fingertips that comes and goes.  The right side is worse than the left side.  She denies any pain in the arms. She does note that " "when she washes her hair that her arms will get quite tired.  She notes her hands feeling constantly cold.  There has been no color change.  Over the last few years she has noticed some swelling in both her hands when her hands are down while she is walking.  There is also some swelling in her ankles bilaterally which she had noted a few months ago.  The swelling in the hands is worse than in the legs.  She works in an office doing mostly computer work.  She denies any chest pain.  She does experience shortness of breath with exertion.    She notes a chronic frontal headache that is there 24/7.  For about 7 years she is also had pressure in the head.  She does feel better laying down and the headache is generally better in the morning.  Over the last 6 months the headache has been predominantly over the right posterior parietal area. She also notes 7 to 8 years of neck pain on the posterior aspect of the neck.  There is no associated shoulder pain.     She has had 3-4 episodes of visual changes lasting about 30 seconds that were associated with bending over and turning.  One episode involved seeing recurring purple lines and another episode involved seeing white spots.  The visual changes occurred in both eyes.  She has not had a classic migraine headache with throbbing, nausea, and hypersensitivities. She notes decades of forgetfulness described as brain fog.    She was evaluated at the [facility] and has seen Drs. Danny Rodriguez, Mick Rey, and Douglas Goddard in visits through 2020.  Per Dr. Barbosa's 6-29-20 note:  \"I reviewed her MRI of the brain without gadolinium from 08/30/2019 which appears to show some mild small vessel ischemic changes in the bilateral subcortical white matter. Her audiogram from 05/05/2020 shows left profound sensorineural hearing loss. Vestibular evaluation on 06/10/2020 reveals a DHI of 44 and PHQ 4 of 1+1. The vestibular test battery was all normal. \"  She was diagnosed with benign " recurrent vertigo with a recommendation to increase her existing topiramate dose from 50 mg to 75 mg.    By the patient's request she was evaluated for CSF leak.  She had nasal pledgets checked for beta-2 transferrin signifying leak which were negative on 11-19-20.  She had a nuclear medicine cisternogram on 1-8-21 that was negative.    PAST MEDICAL HISTORY:  Past Medical History:   Diagnosis Date     Arthritis       PAST SURGICAL HISTORY:  Past Surgical History:   Procedure Laterality Date     GYN SURGERY       SOCIAL HISTORY: , never smoker,     FAMILY HISTORY: neg, neurological, migraine, vertigo, hearing loss     ALLERGIES:  Allergies   Allergen Reactions     Ciprofloxacin Muscle Pain (Myalgia)     Sulfamethoxazole-Trimethoprim Muscle Pain (Myalgia)     MEDICATIONS:    Current Outpatient Medications:      acetaminophen (TYLENOL) 500 MG tablet, Take 500 mg by mouth, Disp: , Rfl:      cholecalciferol 125 MCG (5000 UT) CAPS, Take 5,000 Units by mouth, Disp: , Rfl:      cyclobenzaprine (FLEXERIL) 5 MG tablet, , Disp: , Rfl:      estradiol (ESTRACE VAGINAL) 0.1 MG/GM vaginal cream, , Disp: , Rfl:      fish oil-omega-3 fatty acids 1000 MG capsule, Take 2 g by mouth, Disp: , Rfl:      Magnesium Oxide 500 MG TABS, Take 500 mg by mouth, Disp: , Rfl:      melatonin 5 MG tablet, Take 5 mg by mouth, Disp: , Rfl:      Multiple Vitamins-Minerals (CENTRUM SILVER 50+WOMEN) TABS, daily, Disp: , Rfl:      naproxen sodium (ANAPROX) 220 MG tablet, Take 220 mg by mouth as needed, Disp: , Rfl:      ondansetron (ZOFRAN-ODT) 4 MG ODT tab, Place 4 mg under the tongue, Disp: , Rfl:      SUMAtriptan (IMITREX) 50 MG tablet, as needed, Disp: , Rfl:      topiramate (TOPAMAX) 25 MG capsule, 75 mg daily, Disp: , Rfl:      Turmeric 500 MG CAPS, , Disp: , Rfl:      MECLIZINE HCL PO, Take 25 mg by mouth 3 times daily as needed for dizziness, Disp: , Rfl:      PHYSICAL EXAM:  Vitals:  /89   Pulse 95   Resp 16   Ht  "1.626 m (5' 4\")   Wt 73 kg (161 lb)   SpO2 99%   BMI 27.64 kg/m      Wt Readings from Last 3 Encounters:   19 67.6 kg (149 lb)   19 65.8 kg (145 lb)   16 72.1 kg (159 lb)     General: Patient is well-nourished, well-groomed, in no apparent distress    HEENT: Head is atraumatic, eyes look normal exteriorly, throat clear, neck supple.  Ext: Warm, well-perfused. No edema.    Neurologic:  Mental Status: Alert and oriented to person, place, time, and situation.  Able to provide an excellent history.     Cranial Nerves: Visual fields full to confrontation. Extraocular movements full.  Face sensation normal.  Normal head impulse testing.  Normal hearing to finger rub on the right.  Hearing absent on the left. Face symmetric with normal movements. Tongue and palate midline.  Normal shoulder elevation.      Motor: Normal bulk and tone.  No pronator drift.  Normal foot taps.  Full strength to confrontational testing.    Reflexes: Biceps, Brachioradialis, Triceps, Knees, Ankles 2/4.     Coordination: Normal finger to nose, rapid alternating movements    Gait: Normal stance width.  Negative Romberg.  Good gait initiation.  Good stride length.  Good arm swing.  Normal turn. Able to walk 5 steps in tandem.       Adson's test for Thoracic Outlet Syndrome:  Arms abducted: Negative   Aarms abducted head turned to the RIGHT:   Right hand no symptoms   Left hand gets worse  Arms abducted head turned to the LEFT:   Left hand gets better   Right hand no symptoms    No radiation of pain with palpation under the clavicles.     DATA:  All available and relevant labs, imaging, and other procedures were reviewed personally.   Last brain imagin21: [facility]  EXAM:  NM CSF CISTERNOGRAM        RADIOPHARMACEUTICAL/MEDS:     Route: intrathecal    pentetate indium disodium In 111 (DTPA In-111),0.5 millicurie        TECHNIQUE:  Injection of In-111 DTPA into the lumbar subarachnoid space with    imaging of the entire CNS axis " at multiple time points up to 24 hours after    injection.        COMPARISON:  MRI entire spine 2020. MRI brain 2020.        INDICATION:  Orthostatic headache, persistent rhinitis, episodic vertigo. Assess    for evidence of CSF leak.        FINDINGS:      Radiotracer activity ascends to the basal cisterns by 6 hours and surrounds the    cerebral convexities by 24 hours. No evidence of CSF leak. Immediate    visualization of bladder activity, likely injection related.    Reviewed. Cisternogram showed normal opening pressure and routine analysis was essentially normal with the exception of a mildly elevated protein which I do not believe is of clinical significance. The cisternogram was normal without evidence of CSF leak. At present I conclude she does not have a spontaneous spine CSF leak. Letter sent to the patient.  OP=13.5cmH20    Last audiogram: 20 per [place], left profound hearing loss    Last ENG/VN-10-20 per [place], normal    Last Labs:  CMPNo results for input(s): NA, POTASSIUM, CHLORIDE, CO2, ANIONGAP, GLC, BUN, CR, GFRESTIMATED, GFRESTBLACK, JOSE GUADALUPE, MAG, PHOS, PROTTOTAL, ALBUMIN, BILITOTAL, ALKPHOS, AST, ALT in the last 91946 hours.  CBCNo results for input(s): HGB, WBC, RBC, HCT, MCV, MCH, MCHC, RDW, PLT in the last 23527 hours.  INRNo results for input(s): INR, PTT in the last 07697 hours.    60-minutes were spent in evaluation, examination, and documentation.    Answers for HPI/ROS submitted by the patient on 2021   General Symptoms: No  Skin Symptoms: No  HENT Symptoms: Yes  EYE SYMPTOMS: No  HEART SYMPTOMS: No  LUNG SYMPTOMS: No  INTESTINAL SYMPTOMS: No  URINARY SYMPTOMS: No  GYNECOLOGIC SYMPTOMS: No  BREAST SYMPTOMS: No  SKELETAL SYMPTOMS: Yes  BLOOD SYMPTOMS: Yes  NERVOUS SYSTEM SYMPTOMS: Yes  MENTAL HEALTH SYMPTOMS: No  Ear pain: No  Ear discharge: No  Hearing loss: No  Tinnitus: Yes  Nosebleeds: No  Congestion: No  Sinus pain: No  Trouble swallowing: No   Voice hoarseness:  No  Mouth sores: No  Sore throat: No  Tooth pain: No  Gum tenderness: No  Bleeding gums: No  Change in taste: No  Change in sense of smell: No  Dry mouth: No  Hearing aid used: No  Neck lump: Yes  Back pain: Yes  Muscle aches: Yes  Neck pain: Yes  Swollen joints: No  Joint pain: Yes  Bone pain: No  Muscle cramps: No  Muscle weakness: Yes  Joint stiffness: Yes  Bone fracture: No  Anemia: No  Swollen glands: Yes  Easy bleeding or bruising: No  Edema or swelling: No  Trouble with coordination: No  Dizziness or trouble with balance: Yes  Fainting or black-out spells: Yes  Memory loss: Yes  Headache: Yes  Seizures: No  Speech problems: Yes  Tingling: No  Tremor: No  Weakness: Yes  Difficulty walking: No  Paralysis: No  Numbness: No

## 2021-04-09 DIAGNOSIS — G43.809 VESTIBULAR MIGRAINE: Primary | ICD-10-CM

## 2021-04-09 RX ORDER — TOPIRAMATE 25 MG/1
TABLET, FILM COATED ORAL
Qty: 240 TABLET | Refills: 11 | Status: SHIPPED | OUTPATIENT
Start: 2021-04-09 | End: 2022-05-09

## 2021-04-13 ENCOUNTER — ANCILLARY PROCEDURE (OUTPATIENT)
Dept: ULTRASOUND IMAGING | Facility: CLINIC | Age: 56
End: 2021-04-13
Attending: PSYCHIATRY & NEUROLOGY
Payer: COMMERCIAL

## 2021-04-13 DIAGNOSIS — I87.1 COMPRESSION OF VEIN: ICD-10-CM

## 2021-04-13 DIAGNOSIS — G54.0 TOS (THORACIC OUTLET SYNDROME): ICD-10-CM

## 2021-04-13 PROCEDURE — 93970 EXTREMITY STUDY: CPT | Performed by: RADIOLOGY

## 2021-04-13 PROCEDURE — 93930 UPPER EXTREMITY STUDY: CPT | Performed by: RADIOLOGY

## 2021-04-24 ENCOUNTER — HEALTH MAINTENANCE LETTER (OUTPATIENT)
Age: 56
End: 2021-04-24

## 2021-05-21 ENCOUNTER — TELEPHONE (OUTPATIENT)
Dept: NEUROLOGY | Facility: CLINIC | Age: 56
End: 2021-05-21

## 2021-05-21 NOTE — TELEPHONE ENCOUNTER
I left a voicemail. I let pt know we could see her via video visit instead of in person. We could add her on 7/7 at 5:30p for return video visit or on 7/21 at 6p for video return visit. I ask she call back to let me know if one of these times work for her.     Ene LUGO

## 2021-05-21 NOTE — TELEPHONE ENCOUNTER
"[facility] Call Center    Phone Message    May a detailed message be left on voicemail: yes     Reason for Call: Other: When the clinic staff called and left ms for this pt, staff did not indicate if this f/u was needed \"in person\" or virtual AND also, \"how soon\" pt needed the f/u AND the reason why a f/u was needed. First available \"in person\" appt was 7/21/21. Pt is scheduled for that date for the f/u. Virtual appt for provider pushing out to Aug 2021. Pt thinks the f/u may be about medication, but she is unsure.     Please review, call pt IF pt needs to be seen much sooner than 7/21/21 and IF pt could do a virtual appt. Pt was put on the wait list, as well.  Thank you.    Action Taken: Message routed to:  [facility] ([facility]    Travel Screening: Not Applicable                                                                      "

## 2021-07-07 ENCOUNTER — VIRTUAL VISIT (OUTPATIENT)
Dept: NEUROLOGY | Facility: CLINIC | Age: 56
End: 2021-07-07
Payer: COMMERCIAL

## 2021-07-07 DIAGNOSIS — I87.1 COMPRESSION OF VEIN: Primary | ICD-10-CM

## 2021-07-07 DIAGNOSIS — M24.20 EAGLE'S SYNDROME: ICD-10-CM

## 2021-07-07 DIAGNOSIS — G54.0 TOS (THORACIC OUTLET SYNDROME): ICD-10-CM

## 2021-07-07 PROCEDURE — 99214 OFFICE O/P EST MOD 30 MIN: CPT | Mod: 95 | Performed by: PSYCHIATRY & NEUROLOGY

## 2021-07-07 NOTE — PROGRESS NOTES
"Unable to reach pt., LVM - July 7, 2021 - BBEileen GRAYSON is a 56 year old who is being evaluated via a billable video visit.      How would you like to obtain your AVS? {AVS Preference:[id number]}  If the video visit is dropped, the invitation should be resent by: {video visit invitation:[id number]}  Will anyone else be joining your video visit? {:[id number]}  {If patient encounters technical issues they should call [phone] :[id number]}    Video Start Time: {video visit start/end time for provider to select:989528}  Video-Visit Details    Type of service:  Video Visit    Video End Time:{video visit start/end time for provider to select:[id number]}    Originating Location (pt. Location): {video visit patient location:[id number]::\"Home\"}    Distant Location (provider location):  [facility] NEUROLOGY [facility]     Platform used for Video Visit: {Virtual Visit Platforms:005477::\"[employer]\"}    "

## 2021-07-07 NOTE — PROGRESS NOTES
"The patient is being evaluated via a billable video visit.    How would you like to obtain your AVS? MyChart  If the video visit is dropped, the invitation should be resent by: Send to e-mail at: [email or web address]  Will anyone else be joining your video visit? No      Video-Visit Details  Type of service:  Video Visit  Video Start Time:6:01 PM  Video End Time: 6:30 PM  Originating Location (pt. 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  Her rotational vertigo episodes seem to have been controlled with topiramate but she has a chronic sensation that her vertigo episode might start.  She also has chronic daily pressure headaches and neck pain.  She has chronic bilateral upper extremity weakness.  There is some chronic swelling in the hands but also in the feet more recently.  Paresthesias in the hand and the headache have been worse on the right side by history but on exam today paresthesias are worse in the left hand.  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  If involving the venous system this can also lead to chronic neck pain and headache.  With the head movement triggered symptoms I would also be concerned about internal jugular vein narrowing.      The plan from the last visit on 4-7-21 was as follows:  1.  Titrate up topiramate by adding 25 mg in the morning.  We will gradually titrate up as tolerated to a top dose of 100 mg twice daily  2.  Trial of promethazine oral tablets as well as topical cream  3.  Ultrasound internal jugular veins and thoracic " "outlet  4.  Follow-up with appropriate anatomical imaging with either CT venogram of the head and neck or CT chest depending on ultrasound findings  5.  Refer to physical therapy after ultrasound  6.  Follow-up after imaging completed    Interim History:  She has titrated up to 100mg BID topiramate and has been on this dose for about 6 weeks. She has had no untoward side effects. She did develop some paresthesias, which eventually went away.  She has not had any true vertigo spells but she has had the smaller spells of head \"whooshing.\" She tries to avoid quick head and body movements. She feels that these symptoms are about the same. Head pressure is about the same rated at 4/10, noticeable everyday, mostly on the forehead.  There is no eye pressure or ear pressures. She has deafness in the left ear. Tinnitus is present in the right ear. It is a hissing, or a sharp high-pitched sound. It can get louder with head movements, especially head down to the left. The hands are still swollen and tight in the morning.   She has not had to take any promethazine. She continues to feel that the arms are more tired than the legs. Dizziness is worse with flexion. Tinnitus is worse with head extension. Headache is worse with extension. There is no throat pressure. No problems swallowing. She does not feel that any symptom has changed with the higher dose of topiramate.    We discussed the next step in care which is to get imaging of the head and neck to look at the vascular system.  Since she has not responded to the higher dose of topiramate we will reduce this medication down to 50 mg twice a day and see how it changes her symptoms.  We may eventually switch her completely over to acetazolamide or furosemide.  After the anatomical imaging we will start her on physical therapy.  The imaging will help guide the physical therapy regimen.    Plan:  1. CTvenogram head and neck  2. PT after imaging  3. Taper down on topiramate, 25mg " every week to 50mg BID and then observe.  Acetazolamide or furosemide will be our back-up.  4. Follow-up after imaging    DATA:  I personally reviewed the following data.    Last brain imaging:  US Up Ex [employer] Thor Outlet Syn Bilat  Narrative: THORACIC INLET/OUTLET DUPLEX ULTRASOUND 4/13/2021 5:47 PM    CLINICAL HISTORY: 55F with episodic vertigo, headache, bilateral arm  tingling, question TOS and IJ stenosis; TOS (thoracic outlet  syndrome); Compression of vein.     COMPARISONS: None available.    REFERRING PROVIDER: ASHKAN MILAN CHA    TECHNIQUE: Bilateral innominate, subclavian, and axillary veins were  evaluated grayscale, color Doppler, Doppler waveform ultrasound.  Bilateral subclavian veins were evaluated with color Doppler and  Doppler waveform imaging through abduction maneuvers.    Bilateral index finger PPG's obtained at rest and with provocative  positions.    Bilateral internal jugular veins evaluated at rest with grayscale,  color Doppler, and Doppler waveform ultrasound. Bilateral internal  jugular veins evaluated with color Doppler and Doppler waveform  ultrasound through maneuvers.    FINDINGS:  RIGHT:       REST:            INTERNAL JUGULAR VEIN: 25 cm/s, phasic, fully compressible            INNOMINATE VEIN: 44 cm/s, phasic            SUBCLAVIAN VEIN, medial: 60 cm/s, phasic            SUBCLAVIAN VEIN, mid: 30 cm/s, phasic, fully compressible            SUBCLAVIAN VEIN, lateral: 43 cm/s, phasic, fully  compressible            AXILLARY VEIN: 50 cm/s, phasic, fully compressible         MID SUBCLAVIAN VEIN, sitting erect:            0 degrees: 159 cm/s, phasic            90 degrees: 91 cm/s, phasic            135 degrees: 70 cm/s, phasic            180 degrees: 194 cm/s, phasic         INTERNAL JUGULAR VEIN, sitting erect:            Neutral: 148 cm/s, phasic            Right: 204 cm/s, phasic            Left: 243 cm/s, phasic            Extension: 90 cm/s, phasic            Flexion: 104 cm/s,  phasic         PPGs:            Baseline: Normal            Arms 90: Normal            Arms 180: Normal            : Normal             head right: Normal             head left: Normal            Onset: Normal    LEFT:       REST:            INTERNAL JUGULAR VEIN: 45 cm/s, phasic, fully compressible            INNOMINATE VEIN: 57 cm/s, phasic            SUBCLAVIAN VEIN, medial: 44 cm/s, phasic            SUBCLAVIAN VEIN, mid: 51 cm/s, phasic, fully compressible            SUBCLAVIAN VEIN, lateral: 58 cm/s, phasic, fully  compressible            AXILLARY VEIN: 53 cm/s, phasic, fully compressible         MID SUBCLAVIAN VEIN, sitting erect:            0 degrees: 74 cm/s, phasic            90 degrees: 110 cm/s, phasic            135 degrees: 109 cm/s, phasic            180 degrees: 93 cm/s, phasic         INTERNAL JUGULAR VEIN, sitting erect:            Neutral: 167 cm/s, phasic            Right: 140 cm/s, phasic            Left: 196 cm/s, phasic            Extension: 173 cm/s, phasic            Flexion: 78 cm/s, phasic        PPGs:            Baseline: Normal            Arms 90: Normal            Arms 180: Normal            : Normal             head right: Normal             head left: Normal            Onset: Normal  Impression: IMPRESSION:     1. RIGHT:       A. No subclavian venous stenosis suggested at rest.       B. No subclavian venous stenosis suggested with maneuvers.       C. Elevated internal jugular vein velocities with the patient  upright. Etiology not demonstrated. No significant velocity changes to  suggest narrowing with maneuvers.       D. Amplitude is diminished in baseline position. No arterial  stenosis suggested with maneuvers.    2. LEFT:       A. No subclavian venous stenosis suggested at rest.       B. No subclavian venous stenosis suggested with maneuvers.       C. Elevated internal jugular vein velocities with the patient  upright. Etiology not  demonstrated. No significant velocity changes to  suggest narrowing with maneuvers.       D. Amplitude is diminished in baseline position. No arterial  stenosis suggested with maneuvers.    LALY FONG MD      Last Labs:  CMP  Recent Labs   Lab Test 05/05/20   POTASSIUM 4.5   GLC 95   CR 0.98   GFRESTIMATED 67   GFRESTBLACK 77   AST 16   ALT 15     32-minutes were spent in evaluation, examination, and counseling as well as documentation on the date of service.  After a review of the patient s situation, this visit was changed from an in-person visit to a video visit to reduce the risk of COVID-19 exposure.

## 2021-07-07 NOTE — LETTER
"7/7/2021       RE: Oral Singh  [address] [place] [address]     Dear Colleague,    Thank you for referring your patient, Oral Singh, to the [facility] NEUROLOGY CLINIC [place] [facility]. Please see a copy of my visit note below.    The patient is being evaluated via a billable video visit.    How would you like to obtain your AVS? MyChart  If the video visit is dropped, the invitation should be resent by: Send to e-mail at: [email or web address]  Will anyone else be joining your video visit? No      Video-Visit Details  Type of service:  Video Visit  Video Start Time:6:01 PM  Video End Time: 6:30 PM  Originating Location (pt. 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  Her rotational vertigo episodes seem to have been controlled with topiramate but she has a chronic sensation that her vertigo episode might start.  She also has chronic daily pressure headaches and neck pain.  She has chronic bilateral upper extremity weakness.  There is some chronic swelling in the hands but also in the feet more recently.  Paresthesias in the hand and the headache have been worse on the right side by history but on exam today paresthesias are worse in the left hand.  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  If involving the venous system this can also lead to chronic neck pain and headache.  With the head movement triggered symptoms I would also be concerned about internal jugular vein narrowing.  " "    The plan from the last visit on 4-7-21 was as follows:  1.  Titrate up topiramate by adding 25 mg in the morning.  We will gradually titrate up as tolerated to a top dose of 100 mg twice daily  2.  Trial of promethazine oral tablets as well as topical cream  3.  Ultrasound internal jugular veins and thoracic outlet  4.  Follow-up with appropriate anatomical imaging with either CT venogram of the head and neck or CT chest depending on ultrasound findings  5.  Refer to physical therapy after ultrasound  6.  Follow-up after imaging completed    Interim History:  She has titrated up to 100mg BID topiramate and has been on this dose for about 6 weeks. She has had no untoward side effects. She did develop some paresthesias, which eventually went away.  She has not had any true vertigo spells but she has had the smaller spells of head \"whooshing.\" She tries to avoid quick head and body movements. She feels that these symptoms are about the same. Head pressure is about the same rated at 4/10, noticeable everyday, mostly on the forehead.  There is no eye pressure or ear pressures. She has deafness in the left ear. Tinnitus is present in the right ear. It is a hissing, or a sharp high-pitched sound. It can get louder with head movements, especially head down to the left. The hands are still swollen and tight in the morning.   She has not had to take any promethazine. She continues to feel that the arms are more tired than the legs. Dizziness is worse with flexion. Tinnitus is worse with head extension. Headache is worse with extension. There is no throat pressure. No problems swallowing. She does not feel that any symptom has changed with the higher dose of topiramate.    We discussed the next step in care which is to get imaging of the head and neck to look at the vascular system.  Since she has not responded to the higher dose of topiramate we will reduce this medication down to 50 mg twice a day and see how it changes " her symptoms.  We may eventually switch her completely over to acetazolamide or furosemide.  After the anatomical imaging we will start her on physical therapy.  The imaging will help guide the physical therapy regimen.    Plan:  1. CTvenogram head and neck  2. PT after imaging  3. Taper down on topiramate, 25mg every week to 50mg BID and then observe.  Acetazolamide or furosemide will be our back-up.  4. Follow-up after imaging    DATA:  I personally reviewed the following data.    Last brain imaging:  US Up Ex [employer] Thor Outlet Syn Bilat  Narrative: THORACIC INLET/OUTLET DUPLEX ULTRASOUND 4/13/2021 5:47 PM    CLINICAL HISTORY: 55F with episodic vertigo, headache, bilateral arm  tingling, question TOS and IJ stenosis; TOS (thoracic outlet  syndrome); Compression of vein.     COMPARISONS: None available.    REFERRING PROVIDER: ASHKAN MILAN CHA    TECHNIQUE: Bilateral innominate, subclavian, and axillary veins were  evaluated grayscale, color Doppler, Doppler waveform ultrasound.  Bilateral subclavian veins were evaluated with color Doppler and  Doppler waveform imaging through abduction maneuvers.    Bilateral index finger PPG's obtained at rest and with provocative  positions.    Bilateral internal jugular veins evaluated at rest with grayscale,  color Doppler, and Doppler waveform ultrasound. Bilateral internal  jugular veins evaluated with color Doppler and Doppler waveform  ultrasound through maneuvers.    FINDINGS:  RIGHT:       REST:            INTERNAL JUGULAR VEIN: 25 cm/s, phasic, fully compressible            INNOMINATE VEIN: 44 cm/s, phasic            SUBCLAVIAN VEIN, medial: 60 cm/s, phasic            SUBCLAVIAN VEIN, mid: 30 cm/s, phasic, fully compressible            SUBCLAVIAN VEIN, lateral: 43 cm/s, phasic, fully  compressible            AXILLARY VEIN: 50 cm/s, phasic, fully compressible         MID SUBCLAVIAN VEIN, sitting erect:            0 degrees: 159 cm/s, phasic            90 degrees: 91  cm/s, phasic            135 degrees: 70 cm/s, phasic            180 degrees: 194 cm/s, phasic         INTERNAL JUGULAR VEIN, sitting erect:            Neutral: 148 cm/s, phasic            Right: 204 cm/s, phasic            Left: 243 cm/s, phasic            Extension: 90 cm/s, phasic            Flexion: 104 cm/s, phasic         PPGs:            Baseline: Normal            Arms 90: Normal            Arms 180: Normal            : Normal             head right: Normal             head left: Normal            Onset: Normal    LEFT:       REST:            INTERNAL JUGULAR VEIN: 45 cm/s, phasic, fully compressible            INNOMINATE VEIN: 57 cm/s, phasic            SUBCLAVIAN VEIN, medial: 44 cm/s, phasic            SUBCLAVIAN VEIN, mid: 51 cm/s, phasic, fully compressible            SUBCLAVIAN VEIN, lateral: 58 cm/s, phasic, fully  compressible            AXILLARY VEIN: 53 cm/s, phasic, fully compressible         MID SUBCLAVIAN VEIN, sitting erect:            0 degrees: 74 cm/s, phasic            90 degrees: 110 cm/s, phasic            135 degrees: 109 cm/s, phasic            180 degrees: 93 cm/s, phasic         INTERNAL JUGULAR VEIN, sitting erect:            Neutral: 167 cm/s, phasic            Right: 140 cm/s, phasic            Left: 196 cm/s, phasic            Extension: 173 cm/s, phasic            Flexion: 78 cm/s, phasic        PPGs:            Baseline: Normal            Arms 90: Normal            Arms 180: Normal            : Normal             head right: Normal             head left: Normal            Onset: Normal  Impression: IMPRESSION:     1. RIGHT:       A. No subclavian venous stenosis suggested at rest.       B. No subclavian venous stenosis suggested with maneuvers.       C. Elevated internal jugular vein velocities with the patient  upright. Etiology not demonstrated. No significant velocity changes to  suggest narrowing with maneuvers.       D.  Amplitude is diminished in baseline position. No arterial  stenosis suggested with maneuvers.    2. LEFT:       A. No subclavian venous stenosis suggested at rest.       B. No subclavian venous stenosis suggested with maneuvers.       C. Elevated internal jugular vein velocities with the patient  upright. Etiology not demonstrated. No significant velocity changes to  suggest narrowing with maneuvers.       D. Amplitude is diminished in baseline position. No arterial  stenosis suggested with maneuvers.    LALY FONG MD      Last Labs:  CMP  Recent Labs   Lab Test 05/05/20   POTASSIUM 4.5   GLC 95   CR 0.98   GFRESTIMATED 67   GFRESTBLACK 77   AST 16   ALT 15     32-minutes were spent in evaluation, examination, and counseling as well as documentation on the date of service.  After a review of the patient s situation, this visit was changed from an in-person visit to a video visit to reduce the risk of COVID-19 exposure.          Again, thank you for allowing me to participate in the care of your patient.      Sincerely,    Toshia CAMARILLO Cha, MD

## 2021-07-07 NOTE — Clinical Note
7/7/2021       RE: Oral Singh  [address] [place] [address]     Dear Colleague,    Thank you for referring your patient, Oral Singh, to the [facility] NEUROLOGY CLINIC [facility]. Please see a copy of my visit note below.    No notes on file    Again, thank you for allowing me to participate in the care of your patient.      Sincerely,    oTshia CAMARILLO Cha, MD

## 2021-07-19 ENCOUNTER — ANCILLARY PROCEDURE (OUTPATIENT)
Dept: CT IMAGING | Facility: CLINIC | Age: 56
End: 2021-07-19
Attending: PSYCHIATRY & NEUROLOGY
Payer: COMMERCIAL

## 2021-07-19 DIAGNOSIS — I87.1 COMPRESSION OF VEIN: ICD-10-CM

## 2021-07-19 DIAGNOSIS — G54.0 TOS (THORACIC OUTLET SYNDROME): ICD-10-CM

## 2021-07-19 DIAGNOSIS — M24.20 EAGLE'S SYNDROME: ICD-10-CM

## 2021-07-19 PROCEDURE — 70498 CT ANGIOGRAPHY NECK: CPT | Mod: GC | Performed by: RADIOLOGY

## 2021-07-19 PROCEDURE — 70496 CT ANGIOGRAPHY HEAD: CPT | Mod: GC | Performed by: RADIOLOGY

## 2021-07-19 RX ORDER — IOPAMIDOL 755 MG/ML
75 INJECTION, SOLUTION INTRAVASCULAR ONCE
Status: COMPLETED | OUTPATIENT
Start: 2021-07-19 | End: 2021-07-19

## 2021-07-19 RX ADMIN — IOPAMIDOL 75 ML: 755 INJECTION, SOLUTION INTRAVASCULAR at 16:56

## 2021-10-08 ENCOUNTER — VIRTUAL VISIT (OUTPATIENT)
Dept: NEUROLOGY | Facility: CLINIC | Age: 56
End: 2021-10-08
Payer: COMMERCIAL

## 2021-10-08 DIAGNOSIS — M24.20 EAGLE'S SYNDROME: ICD-10-CM

## 2021-10-08 DIAGNOSIS — I87.1 COMPRESSION OF VEIN: Primary | ICD-10-CM

## 2021-10-08 DIAGNOSIS — M62.838 MUSCLE SPASM: ICD-10-CM

## 2021-10-08 DIAGNOSIS — G54.0 TOS (THORACIC OUTLET SYNDROME): ICD-10-CM

## 2021-10-08 DIAGNOSIS — R42 VERTIGO: ICD-10-CM

## 2021-10-08 PROCEDURE — 99215 OFFICE O/P EST HI 40 MIN: CPT | Mod: 95 | Performed by: PSYCHIATRY & NEUROLOGY

## 2021-10-08 NOTE — Clinical Note
10/8/2021       RE: Oral Singh  [address] [place] [address]     Dear Colleague,    Thank you for referring your patient, Oral Singh, to the [facility] NEUROLOGY CLINIC [facility]. Please see a copy of my visit note below.    No notes on file    Again, thank you for allowing me to participate in the care of your patient.      Sincerely,    Toshia CAMARILLO Cha, MD

## 2021-10-08 NOTE — PROGRESS NOTES
"The patient is being evaluated via a billable video visit.    How would you like to obtain your AVS? MyChart  If the video visit is dropped, the invitation should be resent by: Send to e-mail at: [email or web address]  Will anyone else be joining your video visit? No      Video-Visit Details  Type of service:  Video Visit  Video Start Time:3:29 PM  Video End Time:3:50 PM  Originating Location (pt. 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  She also has chronic daily pressure headaches and neck pain.  She has chronic bilateral upper extremity weakness.  There is some chronic swelling in the hands but also in the feet more recently.  Paresthesias in the hand and the headache have been worse on the right side by history but on exam today paresthesias are worse in the left hand.  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     She continues to feel that the arms are more tired than the legs. Dizziness is worse with flexion. Tinnitus is worse with head extension. Headache is worse with extension. There is no throat pressure. No problems swallowing.     Ultrasound on 4-13-21 showed elevated right internal jugular vein velocities compared to the left but no areas of obstruction. CT venogram on 7-19-21 showed elongated styloid processes at 3.0/3.1cm bilaterally     She still experiences pressure on the top of the head at least once a week that can last 12-24 hours. It is not worse on one side. She is having the lightheadedness episodes throughout the day. She can trigger " several a day triggered by fast head movements. They are lasting about 30-minutes to get back to baseline. This is worse with bending the head forward. She also notes a chronic cough since the beginning of the year. She coughs throughout the day. She may wake up in the middle of the night with coughing. There have been no voice changes. No pressure in the throat. No pain when swallowing.     A trial of topiramate escalated to 100mg BID did not help, so she is down to 50mg BID. Symptoms did not get worse when she came down. She has not had physical therapy.    We discussed the 2 imaging studies and the relevance that they may have to her current symptoms.  I think it be worthwhile given there conservative nature to try a course of physical therapy addressing the thoracic outlet and sternocleidomastoid area.  We would also consider escalating her diagnostic testing with a referral to my colleagues in physical medicine and rehab for a muscle block.  She also notes that her lightheadedness is significantly worse with head flexion.  Given enough time past since her last CT scan she is agreeable to try to CT venogram in the head flexed position which I have ordered.  This may help us with choosing all the correct muscles to inject if she does end up being referred for a muscle block.    Plan:  1. Physical therapy for thoracic outlet as well sternocleidomastoid area  2. CT venogram in the neutral and head flexed positions.   3. Consider trial muscle block with PM&R.       DATA:  I personally reviewed the following data.    Last brain imagin21  CTV Head w Contrast  Narrative: CT venogram without and with intravenous contrast    History:  56F with head movement exacerbated headache, dizziness,  tinnitus. Question venous compression. Styloid length.; Compression of  vein; Eagle's syndrome; TOS (thoracic outlet syndrome).  ICD-10: Compression of vein; Eagle's syndrome; TOS (thoracic outlet  syndrome)    Comparison:   MRI 9/30/2020    Contrast Dose:Isovue 370 75cc    Technique: Post intravenous contrast imaging was obtained with thin  sections from the skull base through the vertex with a delay for  venogram purposes. Images were reviewed on the 3D workstation and  manipulated.    Findings:    Head CTV demonstrates no definite occlusion or thrombus within the  major dural and deep intracranial venous sinuses.  The major intracranial arteries are grossly patent without definite  aneurysm or stenosis.     No hydrocephalus. No enhancing lesions. No midline shift or  herniation. No extra-axial collection. In the neck no evident mass. 1  cm left thyroid lobe nodule.    The right styloid process measures 3.1 cm. The left styloid process  measures 3.3 cm. Both styloid processes abut internal jugular vein  without definite compression.  Impression: Impression:  1. Elongated left and borderline elongated right styloid processes  abut the internal jugular vein without definite compression.  2. No evidence of thrombus intracranially within the major  intracranial venous sinuses.    I have personally reviewed the examination and initial interpretation  and I agree with the findings.    VERONICA SERRANO MD         SYSTEM ID:  [id number]      US Up Ex [employer] Thor Outlet Syn Bilat  Narrative: THORACIC INLET/OUTLET DUPLEX ULTRASOUND 4/13/2021 5:47 PM     CLINICAL HISTORY: 55F with episodic vertigo, headache, bilateral arm  tingling, question TOS and IJ stenosis; TOS (thoracic outlet  syndrome); Compression of vein.      COMPARISONS: None available.     REFERRING PROVIDER: ASHKAN MILAN CHA     TECHNIQUE: Bilateral innominate, subclavian, and axillary veins were  evaluated grayscale, color Doppler, Doppler waveform ultrasound.  Bilateral subclavian veins were evaluated with color Doppler and  Doppler waveform imaging through abduction maneuvers.     Bilateral index finger PPG's obtained at rest and with provocative  positions.     Bilateral internal  jugular veins evaluated at rest with grayscale,  color Doppler, and Doppler waveform ultrasound. Bilateral internal  jugular veins evaluated with color Doppler and Doppler waveform  ultrasound through maneuvers.     FINDINGS:  RIGHT:       REST:            INTERNAL JUGULAR VEIN: 25 cm/s, phasic, fully compressible            INNOMINATE VEIN: 44 cm/s, phasic            SUBCLAVIAN VEIN, medial: 60 cm/s, phasic            SUBCLAVIAN VEIN, mid: 30 cm/s, phasic, fully compressible            SUBCLAVIAN VEIN, lateral: 43 cm/s, phasic, fully  compressible            AXILLARY VEIN: 50 cm/s, phasic, fully compressible          MID SUBCLAVIAN VEIN, sitting erect:            0 degrees: 159 cm/s, phasic            90 degrees: 91 cm/s, phasic            135 degrees: 70 cm/s, phasic            180 degrees: 194 cm/s, phasic          INTERNAL JUGULAR VEIN, sitting erect:            Neutral: 148 cm/s, phasic            Right: 204 cm/s, phasic            Left: 243 cm/s, phasic            Extension: 90 cm/s, phasic            Flexion: 104 cm/s, phasic          PPGs:            Baseline: Normal            Arms 90: Normal            Arms 180: Normal            : Normal             head right: Normal             head left: Normal            Onset: Normal     LEFT:       REST:            INTERNAL JUGULAR VEIN: 45 cm/s, phasic, fully compressible            INNOMINATE VEIN: 57 cm/s, phasic            SUBCLAVIAN VEIN, medial: 44 cm/s, phasic            SUBCLAVIAN VEIN, mid: 51 cm/s, phasic, fully compressible            SUBCLAVIAN VEIN, lateral: 58 cm/s, phasic, fully  compressible            AXILLARY VEIN: 53 cm/s, phasic, fully compressible          MID SUBCLAVIAN VEIN, sitting erect:            0 degrees: 74 cm/s, phasic            90 degrees: 110 cm/s, phasic            135 degrees: 109 cm/s, phasic            180 degrees: 93 cm/s, phasic          INTERNAL JUGULAR VEIN, sitting erect:            Neutral: 167  cm/s, phasic            Right: 140 cm/s, phasic            Left: 196 cm/s, phasic            Extension: 173 cm/s, phasic            Flexion: 78 cm/s, phasic         PPGs:            Baseline: Normal            Arms 90: Normal            Arms 180: Normal            : Normal             head right: Normal             head left: Normal            Onset: Normal  Impression: IMPRESSION:      1. RIGHT:       A. No subclavian venous stenosis suggested at rest.       B. No subclavian venous stenosis suggested with maneuvers.       C. Elevated internal jugular vein velocities with the patient  upright. Etiology not demonstrated. No significant velocity changes to  suggest narrowing with maneuvers.       D. Amplitude is diminished in baseline position. No arterial  stenosis suggested with maneuvers.     2. LEFT:       A. No subclavian venous stenosis suggested at rest.       B. No subclavian venous stenosis suggested with maneuvers.       C. Elevated internal jugular vein velocities with the patient  upright. Etiology not demonstrated. No significant velocity changes to  suggest narrowing with maneuvers.       D. Amplitude is diminished in baseline position. No arterial  stenosis suggested with maneuvers.     LALY FONG MD    Last Labs:  CMP  Recent Labs   Lab Test 05/05/20  0000   POTASSIUM 4.5   GLC 95   CR 0.98   GFRESTIMATED 67   GFRESTBLACK 77   AST 16   ALT 15     CBCNo results for input(s): HGB, WBC, RBC, HCT, MCV, MCH, MCHC, RDW, PLT in the last 04960 hours.  INRNo results for input(s): INR, PTT in the last 18337 hours.    42-minutes were spent in evaluation, examination, and counseling as well as documentation on the date of service.  After a review of the patient s situation, this visit was changed from an in-person visit to a video visit to reduce the risk of COVID-19 exposure.

## 2021-10-08 NOTE — LETTER
"10/8/2021       RE: Oral Singh  [address] [place] [address]     Dear Colleague,    Thank you for referring your patient, Oral Singh, to the [facility]OLOG[facility]IC [place] at [facility]. Please see a copy of my visit note below.    Follow-up 7-7-21:  Oral Singh is a 56 year old female with PMH significant for left ear deafness with episodes of vertigo with now head triggered symptoms of lightheadedness and \"whooshing,\" sensations.  She also has chronic daily pressure headaches and neck pain.  She has chronic bilateral upper extremity weakness.  There is some chronic swelling in the hands but also in the feet more recently.  Paresthesias in the hand and the headache have been worse on the right side by history but on exam today paresthesias are worse in the left hand.  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     She continues to feel that the arms are more tired than the legs. Dizziness is worse with flexion. Tinnitus is worse with head extension. Headache is worse with extension. There is no throat pressure. No problems swallowing.     Ultrasound on 4-13-21 showed elevated right internal jugular vein velocities compared to the left but no areas of obstruction. CT venogram on 7-19-21 showed elongated styloid processes at 3.0/3.1cm bilaterally     She still experiences pressure on the top of the head at least once a week that can last 12-24 hours. It is not worse on one side. She is having the lightheadedness episodes throughout the day. She can trigger several a day triggered by fast head movements. They are lasting about 30-minutes to get back to baseline. This is worse with bending the head forward. She also notes a chronic cough since the beginning of the year. She coughs " throughout the day. She may wake up in the middle of the night with coughing. There have been no voice changes. No pressure in the throat. No pain when swallowing.     A trial of topiramate escalated to 100mg BID did not help, so she is down to 50mg BID. Symptoms did not get worse when she came down. She has not had physical therapy.    We discussed the 2 imaging studies and the relevance that they may have to her current symptoms.  I think it be worthwhile given there conservative nature to try a course of physical therapy addressing the thoracic outlet and sternocleidomastoid area.  We would also consider escalating her diagnostic testing with a referral to my colleagues in physical medicine and rehab for a muscle block.  She also notes that her lightheadedness is significantly worse with head flexion.  Given enough time past since her last CT scan she is agreeable to try to CT venogram in the head flexed position which I have ordered.  This may help us with choosing all the correct muscles to inject if she does end up being referred for a muscle block.    Plan:  1. Physical therapy for thoracic outlet as well sternocleidomastoid area  2. CT venogram in the neutral and head flexed positions.   3. Consider trial muscle block with PM&R.       DATA:  I personally reviewed the following data.    Last brain imagin21  CTV Head w Contrast  Narrative: CT venogram without and with intravenous contrast    History:  56F with head movement exacerbated headache, dizziness,  tinnitus. Question venous compression. Styloid length.; Compression of  vein; Eagle's syndrome; TOS (thoracic outlet syndrome).  ICD-10: Compression of vein; Eagle's syndrome; TOS (thoracic outlet  syndrome)    Comparison:  MRI 2020    Contrast Dose:Isovue 370 75cc    Technique: Post intravenous contrast imaging was obtained with thin  sections from the skull base through the vertex with a delay for  venogram purposes. Images were reviewed on  the 3D workstation and  manipulated.    Findings:    Head CTV demonstrates no definite occlusion or thrombus within the  major dural and deep intracranial venous sinuses.  The major intracranial arteries are grossly patent without definite  aneurysm or stenosis.     No hydrocephalus. No enhancing lesions. No midline shift or  herniation. No extra-axial collection. In the neck no evident mass. 1  cm left thyroid lobe nodule.    The right styloid process measures 3.1 cm. The left styloid process  measures 3.3 cm. Both styloid processes abut internal jugular vein  without definite compression.  Impression: Impression:  1. Elongated left and borderline elongated right styloid processes  abut the internal jugular vein without definite compression.  2. No evidence of thrombus intracranially within the major  intracranial venous sinuses.    I have personally reviewed the examination and initial interpretation  and I agree with the findings.    VERONICA SERRANO MD         SYSTEM ID:  [id number]      US Up Ex [employer] Thor Outlet Syn Bilat  Narrative: THORACIC INLET/OUTLET DUPLEX ULTRASOUND 4/13/2021 5:47 PM     CLINICAL HISTORY: 55F with episodic vertigo, headache, bilateral arm  tingling, question TOS and IJ stenosis; TOS (thoracic outlet  syndrome); Compression of vein.      COMPARISONS: None available.     REFERRING PROVIDER: ASHKAN MILAN CHA     TECHNIQUE: Bilateral innominate, subclavian, and axillary veins were  evaluated grayscale, color Doppler, Doppler waveform ultrasound.  Bilateral subclavian veins were evaluated with color Doppler and  Doppler waveform imaging through abduction maneuvers.     Bilateral index finger PPG's obtained at rest and with provocative  positions.     Bilateral internal jugular veins evaluated at rest with grayscale,  color Doppler, and Doppler waveform ultrasound. Bilateral internal  jugular veins evaluated with color Doppler and Doppler waveform  ultrasound through  maneuvers.     FINDINGS:  RIGHT:       REST:            INTERNAL JUGULAR VEIN: 25 cm/s, phasic, fully compressible            INNOMINATE VEIN: 44 cm/s, phasic            SUBCLAVIAN VEIN, medial: 60 cm/s, phasic            SUBCLAVIAN VEIN, mid: 30 cm/s, phasic, fully compressible            SUBCLAVIAN VEIN, lateral: 43 cm/s, phasic, fully  compressible            AXILLARY VEIN: 50 cm/s, phasic, fully compressible          MID SUBCLAVIAN VEIN, sitting erect:            0 degrees: 159 cm/s, phasic            90 degrees: 91 cm/s, phasic            135 degrees: 70 cm/s, phasic            180 degrees: 194 cm/s, phasic          INTERNAL JUGULAR VEIN, sitting erect:            Neutral: 148 cm/s, phasic            Right: 204 cm/s, phasic            Left: 243 cm/s, phasic            Extension: 90 cm/s, phasic            Flexion: 104 cm/s, phasic          PPGs:            Baseline: Normal            Arms 90: Normal            Arms 180: Normal            : Normal             head right: Normal             head left: Normal            Onset: Normal     LEFT:       REST:            INTERNAL JUGULAR VEIN: 45 cm/s, phasic, fully compressible            INNOMINATE VEIN: 57 cm/s, phasic            SUBCLAVIAN VEIN, medial: 44 cm/s, phasic            SUBCLAVIAN VEIN, mid: 51 cm/s, phasic, fully compressible            SUBCLAVIAN VEIN, lateral: 58 cm/s, phasic, fully  compressible            AXILLARY VEIN: 53 cm/s, phasic, fully compressible          MID SUBCLAVIAN VEIN, sitting erect:            0 degrees: 74 cm/s, phasic            90 degrees: 110 cm/s, phasic            135 degrees: 109 cm/s, phasic            180 degrees: 93 cm/s, phasic          INTERNAL JUGULAR VEIN, sitting erect:            Neutral: 167 cm/s, phasic            Right: 140 cm/s, phasic            Left: 196 cm/s, phasic            Extension: 173 cm/s, phasic            Flexion: 78 cm/s, phasic         PPGs:            Baseline: Normal             Arms 90: Normal            Arms 180: Normal            : Normal             head right: Normal             head left: Normal            Onset: Normal  Impression: IMPRESSION:      1. RIGHT:       A. No subclavian venous stenosis suggested at rest.       B. No subclavian venous stenosis suggested with maneuvers.       C. Elevated internal jugular vein velocities with the patient  upright. Etiology not demonstrated. No significant velocity changes to  suggest narrowing with maneuvers.       D. Amplitude is diminished in baseline position. No arterial  stenosis suggested with maneuvers.     2. LEFT:       A. No subclavian venous stenosis suggested at rest.       B. No subclavian venous stenosis suggested with maneuvers.       C. Elevated internal jugular vein velocities with the patient  upright. Etiology not demonstrated. No significant velocity changes to  suggest narrowing with maneuvers.       D. Amplitude is diminished in baseline position. No arterial  stenosis suggested with maneuvers.     LALY FONG MD    Last Labs:  CMP  Recent Labs   Lab Test 05/05/20  0000   POTASSIUM 4.5   GLC 95   CR 0.98   GFRESTIMATED 67   GFRESTBLACK 77   AST 16   ALT 15     CBCNo results for input(s): HGB, WBC, RBC, HCT, MCV, MCH, MCHC, RDW, PLT in the last 28090 hours.  INRNo results for input(s): INR, PTT in the last 49740 hours.    42-minutes were spent in evaluation, examination, and counseling as well as documentation on the date of service.  After a review of the patient s situation, this visit was changed from an in-person visit to a video visit to reduce the risk of COVID-19 exposure.          Again, thank you for allowing me to participate in the care of your patient.      Sincerely,    Toshia CAMARILLO Cha, MD

## 2021-10-09 ENCOUNTER — ANCILLARY PROCEDURE (OUTPATIENT)
Dept: CT IMAGING | Facility: CLINIC | Age: 56
End: 2021-10-09
Attending: PSYCHIATRY & NEUROLOGY
Payer: COMMERCIAL

## 2021-10-09 ENCOUNTER — HEALTH MAINTENANCE LETTER (OUTPATIENT)
Age: 56
End: 2021-10-09

## 2021-10-09 DIAGNOSIS — M24.20 EAGLE'S SYNDROME: ICD-10-CM

## 2021-10-09 DIAGNOSIS — R42 VERTIGO: ICD-10-CM

## 2021-10-09 DIAGNOSIS — I87.1 COMPRESSION OF VEIN: ICD-10-CM

## 2021-10-09 PROCEDURE — 70496 CT ANGIOGRAPHY HEAD: CPT | Performed by: RADIOLOGY

## 2021-10-09 PROCEDURE — 70498 CT ANGIOGRAPHY NECK: CPT | Performed by: RADIOLOGY

## 2021-10-09 RX ORDER — IOPAMIDOL 755 MG/ML
75 INJECTION, SOLUTION INTRAVASCULAR ONCE
Status: COMPLETED | OUTPATIENT
Start: 2021-10-09 | End: 2021-10-09

## 2021-10-09 RX ADMIN — IOPAMIDOL 75 ML: 755 INJECTION, SOLUTION INTRAVASCULAR at 12:51

## 2021-11-08 ENCOUNTER — THERAPY VISIT (OUTPATIENT)
Dept: PHYSICAL THERAPY | Facility: CLINIC | Age: 56
End: 2021-11-08
Payer: COMMERCIAL

## 2021-11-08 DIAGNOSIS — M62.838 MUSCLE SPASM: ICD-10-CM

## 2021-11-08 DIAGNOSIS — I87.1 COMPRESSION OF VEIN: ICD-10-CM

## 2021-11-08 DIAGNOSIS — G54.0 TOS (THORACIC OUTLET SYNDROME): ICD-10-CM

## 2021-11-08 DIAGNOSIS — M54.2 CERVICAL PAIN: Primary | ICD-10-CM

## 2021-11-08 PROCEDURE — 97110 THERAPEUTIC EXERCISES: CPT | Mod: GP | Performed by: PHYSICAL THERAPIST

## 2021-11-08 PROCEDURE — 97162 PT EVAL MOD COMPLEX 30 MIN: CPT | Mod: GP | Performed by: PHYSICAL THERAPIST

## 2021-11-08 NOTE — PROGRESS NOTES
"Physical Therapy Initial Evaluation  Subjective:  The history is provided by the patient. No  was used.   Patient Health History  Oral Singh being seen for neck, TOS.     Problem began: 11/5/2021.   Problem occurred: unknown   Pain is reported as 3/10 on pain scale.  General health as reported by patient is good.  Pertinent medical history includes: none.   Red flags:  None as reported by patient.  Medical allergies: none.   Surgeries include:  None.    Current medications:  Muscle relaxants.       Primary job tasks include:  Computer work and prolonged sitting.                  Therapist Generated HPI Evaluation  Problem details: Complains of chronic R sided neck pain, severe episodes of vertigo that result in nausea (off balance), and a \"whooshing\" sensation when she moves her head. Also complains of headaches, that she describes as \"migraines\" (pressure) in her frontal lobe (occurs 1-2 per month), with possible aura of purple bars of shooting lights; however, when these occur the headaches are not as bad.    CT 10/9/2021 Impression:  1. No compression of the internal jugular veins in the neutral  position.  2. Mild right and moderate left upper internal jugular venous  narrowing with flexion.  3. Mildly elongated left styloid process on the left. Normal length of  the right styloid process.  4. No thrombus of the major intracranial venous sinuses.   .         Type of problem:  Cervical spine.    This is a chronic condition.  Condition occurred with:  Insidious onset.  Where condition occurred: for unknown reasons.  Patient reports pain:  Cervical right side.  Pain is described as other and aching (pressure, dull) and is constant.  Pain radiates to:  Head. Pain is the same all the time.  Since onset symptoms are gradually worsening.  Associated symptoms:  Headache, loss of motion/stiffness, dizziness and ringing in ears. Symptoms are exacerbated by looking up or down, rotating head, " stress, certain positions and change of position  and relieved by other (laying supine).  Special tests included:  CT scan.  Previous treatment includes chiropractic. There was mild improvement following previous treatment.  Restrictions due to condition include:  Working in normal job without restrictions.  Barriers include:  None as reported by patient.                        Objective:  Standing Alignment:    Cervical/Thoracic:  Forward head  Shoulder/UE:  Rounded shoulders                  Flexibility/Screens:     Upper Extremity:    Decreased left upper extremity flexibility at:  Pectoralis Major and Pectoralis Minor    Decreased right upper extremity flexibility present at:  Pectoralis Major and Pectoralis Minor    Spine:  Decreased left spine flexibility:  Sternocleidomastoid; Scalenes; Upper Trap and Levator    Decreased right spine flexibility:  Sternocleidomastoid; Scalenes; Upper Trap and Levator                  Cervical/Thoracic Evaluation    AROM:  AROM Cervical:    Flexion:          90%  Extension:       25%  Rotation:         Left: 65% painful     Right: 65% painful  Side Bend:      Left:     Right:       Headaches: cervical  Cervical Myotomes:  normal                        Cervical Palpation:    Tenderness present at Left:    Erector Spinae and Suboccipitals  Tenderness present at Right:    Sternocleidomstoid; Scalenes; Rhomboids; Upper Trap; Levator; Erector Spinae and Suboccipitals               Shoulder Evaluation:  ROM:  AROM:  normal                                                                             General     ROS    Assessment/Plan:    Patient is a 56 year old female with cervical and head complaints.    Patient has the following significant findings with corresponding treatment plan.                Diagnosis 1:  Neck pain  Pain -  hot/cold therapy, manual therapy, education, directional preference exercise and home program  Decreased ROM/flexibility - manual therapy and therapeutic  exercise  Impaired muscle performance - neuro re-education  Decreased function - therapeutic activities  Impaired posture - neuro re-education  Diagnosis 2:  TOS   Pain -  hot/cold therapy, manual therapy, education and home program  Decreased ROM/flexibility - manual therapy and therapeutic exercise  Impaired muscle performance - neuro re-education  Decreased function - therapeutic activities  Impaired posture - neuro re-education    Therapy Evaluation Codes:   1) History comprised of:   Personal factors that impact the plan of care:      Time since onset of symptoms.    Comorbidity factors that impact the plan of care are:      None.     Medications impacting care: None.  2) Examination of Body Systems comprised of:   Body structures and functions that impact the plan of care:      Cervical spine and Head.   Activity limitations that impact the plan of care are:      Driving, Lifting, Reading/Computer work, Working, Sleeping and Laying down.  3) Clinical presentation characteristics are:   Evolving/Changing.  4) Decision-Making    Moderate complexity using standardized patient assessment instrument and/or measureable assessment of functional outcome.  Cumulative Therapy Evaluation is: Moderate complexity.    Previous and current functional limitations:  (See Goal Flow Sheet for this information)    Short term and Long term goals: (See Goal Flow Sheet for this information)     Communication ability:  Patient appears to be able to clearly communicate and understand verbal and written communication and follow directions correctly.  Treatment Explanation - The following has been discussed with the patient:   RX ordered/plan of care  Anticipated outcomes  Possible risks and side effects  This patient would benefit from PT intervention to resume normal activities.   Rehab potential is good.    Frequency:  1 X week, once daily  Duration:  for 8 weeks  Discharge Plan:  Achieve all LTG.  Independent in home treatment  program.  Reach maximal therapeutic benefit.    Please refer to the daily flowsheet for treatment today, total treatment time and time spent performing 1:1 timed codes.

## 2021-11-24 ENCOUNTER — THERAPY VISIT (OUTPATIENT)
Dept: PHYSICAL THERAPY | Facility: CLINIC | Age: 56
End: 2021-11-24
Payer: COMMERCIAL

## 2021-11-24 DIAGNOSIS — M54.2 CERVICAL PAIN: ICD-10-CM

## 2021-11-24 DIAGNOSIS — I87.1 COMPRESSION OF VEIN: ICD-10-CM

## 2021-11-24 DIAGNOSIS — G54.0 TOS (THORACIC OUTLET SYNDROME): ICD-10-CM

## 2021-11-24 PROCEDURE — 97140 MANUAL THERAPY 1/> REGIONS: CPT | Mod: GP | Performed by: PHYSICAL THERAPIST

## 2021-11-24 PROCEDURE — 97110 THERAPEUTIC EXERCISES: CPT | Mod: GP | Performed by: PHYSICAL THERAPIST

## 2021-12-07 ENCOUNTER — THERAPY VISIT (OUTPATIENT)
Dept: PHYSICAL THERAPY | Facility: CLINIC | Age: 56
End: 2021-12-07
Payer: COMMERCIAL

## 2021-12-07 DIAGNOSIS — I87.1 COMPRESSION OF VEIN: ICD-10-CM

## 2021-12-07 DIAGNOSIS — M54.2 CERVICAL PAIN: ICD-10-CM

## 2021-12-07 DIAGNOSIS — G54.0 TOS (THORACIC OUTLET SYNDROME): ICD-10-CM

## 2021-12-07 PROCEDURE — 97110 THERAPEUTIC EXERCISES: CPT | Mod: GP | Performed by: PHYSICAL THERAPIST

## 2021-12-07 PROCEDURE — 97140 MANUAL THERAPY 1/> REGIONS: CPT | Mod: GP | Performed by: PHYSICAL THERAPIST

## 2021-12-14 ENCOUNTER — THERAPY VISIT (OUTPATIENT)
Dept: PHYSICAL THERAPY | Facility: CLINIC | Age: 56
End: 2021-12-14
Payer: COMMERCIAL

## 2021-12-14 DIAGNOSIS — I87.1 COMPRESSION OF VEIN: ICD-10-CM

## 2021-12-14 DIAGNOSIS — M54.2 CERVICAL PAIN: ICD-10-CM

## 2021-12-14 DIAGNOSIS — G54.0 TOS (THORACIC OUTLET SYNDROME): ICD-10-CM

## 2021-12-14 PROCEDURE — 97110 THERAPEUTIC EXERCISES: CPT | Mod: GP | Performed by: PHYSICAL THERAPIST

## 2021-12-14 PROCEDURE — 97140 MANUAL THERAPY 1/> REGIONS: CPT | Mod: GP | Performed by: PHYSICAL THERAPIST

## 2021-12-20 ENCOUNTER — THERAPY VISIT (OUTPATIENT)
Dept: PHYSICAL THERAPY | Facility: CLINIC | Age: 56
End: 2021-12-20
Payer: COMMERCIAL

## 2021-12-20 DIAGNOSIS — I87.1 COMPRESSION OF VEIN: ICD-10-CM

## 2021-12-20 DIAGNOSIS — G54.0 TOS (THORACIC OUTLET SYNDROME): ICD-10-CM

## 2021-12-20 DIAGNOSIS — M54.2 CERVICAL PAIN: ICD-10-CM

## 2021-12-20 PROCEDURE — 97140 MANUAL THERAPY 1/> REGIONS: CPT | Mod: GP | Performed by: PHYSICAL THERAPIST

## 2021-12-20 PROCEDURE — 97112 NEUROMUSCULAR REEDUCATION: CPT | Mod: GP | Performed by: PHYSICAL THERAPIST

## 2021-12-20 PROCEDURE — 97110 THERAPEUTIC EXERCISES: CPT | Mod: GP | Performed by: PHYSICAL THERAPIST

## 2022-01-28 ENCOUNTER — VIRTUAL VISIT (OUTPATIENT)
Dept: NEUROLOGY | Facility: CLINIC | Age: 57
End: 2022-01-28
Payer: COMMERCIAL

## 2022-01-28 DIAGNOSIS — R42 VERTIGO: ICD-10-CM

## 2022-01-28 DIAGNOSIS — I87.1 COMPRESSION OF VEIN: Primary | ICD-10-CM

## 2022-01-28 DIAGNOSIS — M62.838 MUSCLE SPASM: ICD-10-CM

## 2022-01-28 DIAGNOSIS — R42 LIGHTHEADEDNESS: ICD-10-CM

## 2022-01-28 DIAGNOSIS — G54.0 TOS (THORACIC OUTLET SYNDROME): ICD-10-CM

## 2022-01-28 PROCEDURE — 99213 OFFICE O/P EST LOW 20 MIN: CPT | Mod: 95 | Performed by: PSYCHIATRY & NEUROLOGY

## 2022-01-28 NOTE — LETTER
"1/28/2022       RE: Oral Singh  [address] [place] [address]     Dear Colleague,    Thank you for referring your patient, Oral Singh, to the [facility] [place] [facility]. Please see a copy of my visit note below.    The patient is being evaluated via a billable video visit.    How would you like to obtain your AVS? MyChart  If the video visit is dropped, the invitation should be resent by: Send to e-mail at: [email or web address]  Will anyone else be joining your video visit? No      Video-Visit Details  Type of service:  Video Visit  Video Start Time:3:43 PM  Video End Time:3:58 PM  Originating Location (pt. Location): Home  Distant Location (provider location):  [facility]   Platform used for Video Visit: [employer]    Follow-up 7-7-21, 10-8-21:  Oral Singh is a 56 year old female with PMH significant for left ear deafness with episodes of vertigo with now head triggered symptoms of lightheadedness and \"whooshing,\" sensations.  She also has chronic daily pressure headaches and neck pain.  She has chronic bilateral upper extremity weakness.  There is some chronic swelling in the hands but also in the feet more recently.  Paresthesias in the hand and the headache have been worse on the right side by history but on exam today paresthesias are worse in the left hand.  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      She continues to feel that the arms are more tired than the legs. Dizziness is worse with flexion. Tinnitus is worse with head extension. Headache is worse with extension. There is no throat pressure. No problems swallowing.      Ultrasound on 4-13-21 showed elevated right internal jugular vein velocities compared to the left " but no areas of obstruction. CT venogram on 7-19-21 showed elongated styloid processes at 3.0/3.1cm bilaterally      She still experiences pressure on the top of the head at least once a week that can last 12-24 hours. It is not worse on one side. She is having the lightheadedness episodes throughout the day. She can trigger several a day triggered by fast head movements. They are lasting about 30-minutes to get back to baseline. This is worse with bending the head forward. She also notes a chronic cough since the beginning of the year. She coughs throughout the day. She may wake up in the middle of the night with coughing. There have been no voice changes. No pressure in the throat. No pain when swallowing.      A trial of topiramate escalated to 100mg BID did not help, so she is down to 50mg BID. Symptoms did not get worse when she came down. She has not had physical therapy.     We discussed the 2 imaging studies and the relevance that they may have to her current symptoms.  I think it be worthwhile given there conservative nature to try a course of physical therapy addressing the thoracic outlet and sternocleidomastoid area.  We would also consider escalating her diagnostic testing with a referral to my colleagues in physical medicine and rehab for a muscle block.  She also notes that her lightheadedness is significantly worse with head flexion.  Given enough time past since her last CT scan she is agreeable to try to CT venogram in the head flexed position which I have ordered.  This may help us with choosing all the correct muscles to inject if she does end up being referred for a muscle block.     Plan:  1. Physical therapy for thoracic outlet as well sternocleidomastoid area  2. CT venogram in the neutral and head flexed positions.   3. Consider trial muscle block with PM&R.      Interim History 1-28-22:  She has had 8 sessions of PT through December 2021. She would better on the day of the session but she  would revert to her baseline sessions the next day. She didn't see any sustained improvement in the headache or dizziness but she does still do some neck stretches at home because the neck feels better.     She still has a chronic daily pressure headaches mostly in the front of the head but can be at the base of the skull. There have been no spinning very episodes since our last visit. But she continues to experience the lightheadedness mostly in the upright position, sitting and standing but better laying down.     There is still arm weakness but not so much the tingling.   We discussed the plan going forward to escalate to trying muscle blocks and Botox. She is agreeable with this plan.     Plan:   1. Referral for PM&R for bilateral SCM and ASM blocks.   2. Continue topiramate 50mg BID  3. Follow-up 3 months        DATA:  I personally reviewed the following data.    Last brain imaging:  CTV Head w Contrast  Narrative: CT venogram of the head and neck with intravenous contrast    History:  56F with head flexion triggered lightheadedness, question IJ  compression, styloid length.; Compression of vein; Vertigo; Eagle's  syndrome.  ICD-10: Compression of vein; Vertigo; Eagle's syndrome    Comparison:  CT venogram 7/19/2021.    Contrast Dose:Isovue 370 75cc    Technique: Post intravenous contrast imaging was obtained with thin  sections from the aortic arch through the cranial vertex with a delay  for venogram purposes. Imaging was repeated with the neck in flexion.  3-D reconstructions were performed.    Findings:  Head CTV demonstrates no occlusion or thrombus of the major dural and  deep intracranial venous sinuses.    The internal jugular veins are patent bilaterally without thrombus.    No compression of the right internal jugular vein in the neutral  position. With flexion, there is mild narrowing of the upper right  internal jugular vein as it courses along the posterior belly of the  digastric muscle.    No  compression of the left internal jugular vein in the neutral  position. With flexion, there is moderate narrowing of the upper left  internal jugular vein as it courses between the posterior belly of the  digastric muscle and upper styloid process. This is similar to the  extent of narrowing seen with extension on 7/19/2021.    Styloid process measures 2.9 cm on the right.    Styloid process measures 3.5 cm on the left.    Cervical spondylosis, most pronounced at C5-6 and C6-7.    9 mm left thyroid nodule, unchanged since 7/19/2021 and requiring no  further follow-up by imaging criteria.  Impression: Impression:  1. No compression of the internal jugular veins in the neutral  position.  2. Mild right and moderate left upper internal jugular venous  narrowing with flexion.  3. Mildly elongated left styloid process on the left. Normal length of  the right styloid process.  4. No thrombus of the major intracranial venous sinuses.    JOSUE CORDOBA MD      THORACIC INLET/OUTLET DUPLEX ULTRASOUND 4/13/2021      CLINICAL HISTORY: 55F with episodic vertigo, headache, bilateral arm  tingling, question TOS and IJ stenosis; TOS (thoracic outlet  syndrome); Compression of vein.      COMPARISONS: None available.     REFERRING PROVIDER: ASHKAN MILAN CHA     TECHNIQUE: Bilateral innominate, subclavian, and axillary veins were  evaluated grayscale, color Doppler, Doppler waveform ultrasound.  Bilateral subclavian veins were evaluated with color Doppler and  Doppler waveform imaging through abduction maneuvers.     Bilateral index finger PPG's obtained at rest and with provocative  positions.     Bilateral internal jugular veins evaluated at rest with grayscale,  color Doppler, and Doppler waveform ultrasound. Bilateral internal  jugular veins evaluated with color Doppler and Doppler waveform  ultrasound through maneuvers.     FINDINGS:  RIGHT:       REST:            INTERNAL JUGULAR VEIN: 25 cm/s, phasic, fully compressible             INNOMINATE VEIN: 44 cm/s, phasic            SUBCLAVIAN VEIN, medial: 60 cm/s, phasic            SUBCLAVIAN VEIN, mid: 30 cm/s, phasic, fully compressible            SUBCLAVIAN VEIN, lateral: 43 cm/s, phasic, fully  compressible            AXILLARY VEIN: 50 cm/s, phasic, fully compressible          MID SUBCLAVIAN VEIN, sitting erect:            0 degrees: 159 cm/s, phasic            90 degrees: 91 cm/s, phasic            135 degrees: 70 cm/s, phasic            180 degrees: 194 cm/s, phasic          INTERNAL JUGULAR VEIN, sitting erect:            Neutral: 148 cm/s, phasic            Right: 204 cm/s, phasic            Left: 243 cm/s, phasic            Extension: 90 cm/s, phasic            Flexion: 104 cm/s, phasic          PPGs:            Baseline: Normal            Arms 90: Normal            Arms 180: Normal            : Normal             head right: Normal             head left: Normal            Onset: Normal     LEFT:       REST:            INTERNAL JUGULAR VEIN: 45 cm/s, phasic, fully compressible            INNOMINATE VEIN: 57 cm/s, phasic            SUBCLAVIAN VEIN, medial: 44 cm/s, phasic            SUBCLAVIAN VEIN, mid: 51 cm/s, phasic, fully compressible            SUBCLAVIAN VEIN, lateral: 58 cm/s, phasic, fully  compressible            AXILLARY VEIN: 53 cm/s, phasic, fully compressible          MID SUBCLAVIAN VEIN, sitting erect:            0 degrees: 74 cm/s, phasic            90 degrees: 110 cm/s, phasic            135 degrees: 109 cm/s, phasic            180 degrees: 93 cm/s, phasic          INTERNAL JUGULAR VEIN, sitting erect:            Neutral: 167 cm/s, phasic            Right: 140 cm/s, phasic            Left: 196 cm/s, phasic            Extension: 173 cm/s, phasic            Flexion: 78 cm/s, phasic         PPGs:            Baseline: Normal            Arms 90: Normal            Arms 180: Normal            : Normal             head right: Normal              head left: Normal            Onset: Normal                                                                      IMPRESSION:      1. RIGHT:       A. No subclavian venous stenosis suggested at rest.       B. No subclavian venous stenosis suggested with maneuvers.       C. Elevated internal jugular vein velocities with the patient  upright. Etiology not demonstrated. No significant velocity changes to  suggest narrowing with maneuvers.       D. Amplitude is diminished in baseline position. No arterial  stenosis suggested with maneuvers.     2. LEFT:       A. No subclavian venous stenosis suggested at rest.       B. No subclavian venous stenosis suggested with maneuvers.       C. Elevated internal jugular vein velocities with the patient  upright. Etiology not demonstrated. No significant velocity changes to  suggest narrowing with maneuvers.       D. Amplitude is diminished in baseline position. No arterial  stenosis suggested with maneuvers.     LALY FONG MD    23-minutes were spent in evaluation, examination, and counseling as well as documentation on the date of service.  After a review of the patient s situation, this visit was changed from an in-person visit to a video visit to reduce the risk of COVID-19 exposure.

## 2022-01-28 NOTE — PROGRESS NOTES
"The patient is being evaluated via a billable video visit.    How would you like to obtain your AVS? MyChart  If the video visit is dropped, the invitation should be resent by: Send to e-mail at: [email or web address]  Will anyone else be joining your video visit? No      Video-Visit Details  Type of service:  Video Visit  Video Start Time:3:43 PM  Video End Time:3:58 PM  Originating Location (pt. 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  She also has chronic daily pressure headaches and neck pain.  She has chronic bilateral upper extremity weakness.  There is some chronic swelling in the hands but also in the feet more recently.  Paresthesias in the hand and the headache have been worse on the right side by history but on exam today paresthesias are worse in the left hand.  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      She continues to feel that the arms are more tired than the legs. Dizziness is worse with flexion. Tinnitus is worse with head extension. Headache is worse with extension. There is no throat pressure. No problems swallowing.      Ultrasound on 4-13-21 showed elevated right internal jugular vein velocities compared to the left but no areas of obstruction. CT venogram on 7-19-21 showed elongated styloid processes at 3.0/3.1cm bilaterally      She still experiences pressure on the top of the head at least once a week that can last 12-24 hours. It is not worse on one side. She is having the lightheadedness episodes throughout the day. She can " trigger several a day triggered by fast head movements. They are lasting about 30-minutes to get back to baseline. This is worse with bending the head forward. She also notes a chronic cough since the beginning of the year. She coughs throughout the day. She may wake up in the middle of the night with coughing. There have been no voice changes. No pressure in the throat. No pain when swallowing.      A trial of topiramate escalated to 100mg BID did not help, so she is down to 50mg BID. Symptoms did not get worse when she came down. She has not had physical therapy.     We discussed the 2 imaging studies and the relevance that they may have to her current symptoms.  I think it be worthwhile given there conservative nature to try a course of physical therapy addressing the thoracic outlet and sternocleidomastoid area.  We would also consider escalating her diagnostic testing with a referral to my colleagues in physical medicine and rehab for a muscle block.  She also notes that her lightheadedness is significantly worse with head flexion.  Given enough time past since her last CT scan she is agreeable to try to CT venogram in the head flexed position which I have ordered.  This may help us with choosing all the correct muscles to inject if she does end up being referred for a muscle block.     Plan:  1. Physical therapy for thoracic outlet as well sternocleidomastoid area  2. CT venogram in the neutral and head flexed positions.   3. Consider trial muscle block with PM&R.      Interim History 1-28-22:  She has had 8 sessions of PT through December 2021. She would better on the day of the session but she would revert to her baseline sessions the next day. She didn't see any sustained improvement in the headache or dizziness but she does still do some neck stretches at home because the neck feels better.     She still has a chronic daily pressure headaches mostly in the front of the head but can be at the base of the  skull. There have been no spinning very episodes since our last visit. But she continues to experience the lightheadedness mostly in the upright position, sitting and standing but better laying down.     There is still arm weakness but not so much the tingling.   We discussed the plan going forward to escalate to trying muscle blocks and Botox. She is agreeable with this plan.     Plan:   1. Referral for PM&R for bilateral SCM and ASM blocks.   2. Continue topiramate 50mg BID  3. Follow-up 3 months        DATA:  I personally reviewed the following data.    Last brain imaging:  CTV Head w Contrast  Narrative: CT venogram of the head and neck with intravenous contrast    History:  56F with head flexion triggered lightheadedness, question IJ  compression, styloid length.; Compression of vein; Vertigo; Eagle's  syndrome.  ICD-10: Compression of vein; Vertigo; Eagle's syndrome    Comparison:  CT venogram 7/19/2021.    Contrast Dose:Isovue 370 75cc    Technique: Post intravenous contrast imaging was obtained with thin  sections from the aortic arch through the cranial vertex with a delay  for venogram purposes. Imaging was repeated with the neck in flexion.  3-D reconstructions were performed.    Findings:  Head CTV demonstrates no occlusion or thrombus of the major dural and  deep intracranial venous sinuses.    The internal jugular veins are patent bilaterally without thrombus.    No compression of the right internal jugular vein in the neutral  position. With flexion, there is mild narrowing of the upper right  internal jugular vein as it courses along the posterior belly of the  digastric muscle.    No compression of the left internal jugular vein in the neutral  position. With flexion, there is moderate narrowing of the upper left  internal jugular vein as it courses between the posterior belly of the  digastric muscle and upper styloid process. This is similar to the  extent of narrowing seen with extension on  7/19/2021.    Styloid process measures 2.9 cm on the right.    Styloid process measures 3.5 cm on the left.    Cervical spondylosis, most pronounced at C5-6 and C6-7.    9 mm left thyroid nodule, unchanged since 7/19/2021 and requiring no  further follow-up by imaging criteria.  Impression: Impression:  1. No compression of the internal jugular veins in the neutral  position.  2. Mild right and moderate left upper internal jugular venous  narrowing with flexion.  3. Mildly elongated left styloid process on the left. Normal length of  the right styloid process.  4. No thrombus of the major intracranial venous sinuses.    JOSUE CORDOBA MD      THORACIC INLET/OUTLET DUPLEX ULTRASOUND 4/13/2021      CLINICAL HISTORY: 55F with episodic vertigo, headache, bilateral arm  tingling, question TOS and IJ stenosis; TOS (thoracic outlet  syndrome); Compression of vein.      COMPARISONS: None available.     REFERRING PROVIDER: ASHKAN MILAN CHA     TECHNIQUE: Bilateral innominate, subclavian, and axillary veins were  evaluated grayscale, color Doppler, Doppler waveform ultrasound.  Bilateral subclavian veins were evaluated with color Doppler and  Doppler waveform imaging through abduction maneuvers.     Bilateral index finger PPG's obtained at rest and with provocative  positions.     Bilateral internal jugular veins evaluated at rest with grayscale,  color Doppler, and Doppler waveform ultrasound. Bilateral internal  jugular veins evaluated with color Doppler and Doppler waveform  ultrasound through maneuvers.     FINDINGS:  RIGHT:       REST:            INTERNAL JUGULAR VEIN: 25 cm/s, phasic, fully compressible            INNOMINATE VEIN: 44 cm/s, phasic            SUBCLAVIAN VEIN, medial: 60 cm/s, phasic            SUBCLAVIAN VEIN, mid: 30 cm/s, phasic, fully compressible            SUBCLAVIAN VEIN, lateral: 43 cm/s, phasic, fully  compressible            AXILLARY VEIN: 50 cm/s, phasic, fully compressible          MID  SUBCLAVIAN VEIN, sitting erect:            0 degrees: 159 cm/s, phasic            90 degrees: 91 cm/s, phasic            135 degrees: 70 cm/s, phasic            180 degrees: 194 cm/s, phasic          INTERNAL JUGULAR VEIN, sitting erect:            Neutral: 148 cm/s, phasic            Right: 204 cm/s, phasic            Left: 243 cm/s, phasic            Extension: 90 cm/s, phasic            Flexion: 104 cm/s, phasic          PPGs:            Baseline: Normal            Arms 90: Normal            Arms 180: Normal            : Normal             head right: Normal             head left: Normal            Onset: Normal     LEFT:       REST:            INTERNAL JUGULAR VEIN: 45 cm/s, phasic, fully compressible            INNOMINATE VEIN: 57 cm/s, phasic            SUBCLAVIAN VEIN, medial: 44 cm/s, phasic            SUBCLAVIAN VEIN, mid: 51 cm/s, phasic, fully compressible            SUBCLAVIAN VEIN, lateral: 58 cm/s, phasic, fully  compressible            AXILLARY VEIN: 53 cm/s, phasic, fully compressible          MID SUBCLAVIAN VEIN, sitting erect:            0 degrees: 74 cm/s, phasic            90 degrees: 110 cm/s, phasic            135 degrees: 109 cm/s, phasic            180 degrees: 93 cm/s, phasic          INTERNAL JUGULAR VEIN, sitting erect:            Neutral: 167 cm/s, phasic            Right: 140 cm/s, phasic            Left: 196 cm/s, phasic            Extension: 173 cm/s, phasic            Flexion: 78 cm/s, phasic         PPGs:            Baseline: Normal            Arms 90: Normal            Arms 180: Normal            : Normal             head right: Normal             head left: Normal            Onset: Normal                                                                      IMPRESSION:      1. RIGHT:       A. No subclavian venous stenosis suggested at rest.       B. No subclavian venous stenosis suggested with maneuvers.       C. Elevated internal  jugular vein velocities with the patient  upright. Etiology not demonstrated. No significant velocity changes to  suggest narrowing with maneuvers.       D. Amplitude is diminished in baseline position. No arterial  stenosis suggested with maneuvers.     2. LEFT:       A. No subclavian venous stenosis suggested at rest.       B. No subclavian venous stenosis suggested with maneuvers.       C. Elevated internal jugular vein velocities with the patient  upright. Etiology not demonstrated. No significant velocity changes to  suggest narrowing with maneuvers.       D. Amplitude is diminished in baseline position. No arterial  stenosis suggested with maneuvers.     LALY FONG MD    23-minutes were spent in evaluation, examination, and counseling as well as documentation on the date of service.  After a review of the patient s situation, this visit was changed from an in-person visit to a video visit to reduce the risk of COVID-19 exposure.

## 2022-01-28 NOTE — Clinical Note
1/28/2022       RE: Oral Singh  [address] [place] [address]     Dear Colleague,    Thank you for referring your patient, Oral Singh, to the [facility] NEUROLOGY CLINIC [facility]. Please see a copy of my visit note below.    No notes on file    Again, thank you for allowing me to participate in the care of your patient.      Sincerely,    Toshia CAMARILLO Cha, MD

## 2022-02-09 PROBLEM — G54.0 TOS (THORACIC OUTLET SYNDROME): Status: RESOLVED | Noted: 2021-11-08 | Resolved: 2022-02-09

## 2022-02-09 PROBLEM — M54.2 CERVICAL PAIN: Status: RESOLVED | Noted: 2021-11-08 | Resolved: 2022-02-09

## 2022-02-09 PROBLEM — I87.1 COMPRESSION OF VEIN: Status: RESOLVED | Noted: 2021-11-08 | Resolved: 2022-02-09

## 2022-02-09 NOTE — PROGRESS NOTES
Discharge Note    Progress reporting period is from initial evaluation date (please see noted date below) to Dec 20, 2021.  Linked Episodes   Type: Episode: Status: Noted: Resolved: Last update: Updated by:   PHYSICAL THERAPY TOS 11/8/2021 Active 11/8/2021 12/20/2021  4:28 PM Glenda Santamaria PT      Comments:       Oral failed to follow up and current status is unknown.  Please see information below for last relevant information on current status.  Patient seen for 5 visits.    SUBJECTIVE  Subjective changes noted by patient:  Had a bad headache this weekend, worse than normal. At the beginning of this headache her neck was painful. Can't think of anything different that triggered this headache. No change in dizziness or tinnitus.   .  Current pain level is 0/10.     Previous pain level was  3/10.   Changes in function:  Yes (See Goal flowsheet attached for changes in current functional level)  Adverse reaction to treatment or activity: None    OBJECTIVE  Changes noted in objective findings: AROM Cervical Flx: 75%, Ext: 25%, Rot R: 75%, L: 65%     ASSESSMENT/PLAN  Diagnosis: neck pain   Updated problem list and treatment plan:   Pain - HEP  Decreased ROM/flexibility - HEP  Decreased function - HEP  Impaired muscle performance - HEP  STG/LTGs have been met or progress has been made towards goals:  Yes, please see goal flowsheet for most current information  Assessment of Progress: current status is unknown.    Last current status:     Self Management Plans:  HEP  I have re-evaluated this patient and find that the nature, scope, duration and intensity of the therapy is appropriate for the medical condition of the patient.  Oral continues to require the following intervention to meet STG and LTG's:  HEP.    Recommendations:  Discharge with current home program.  Patient to follow up with MD as needed.    Please refer to the daily flowsheet for treatment today, total treatment time and time spent performing 1:1  timed codes.

## 2022-02-21 ENCOUNTER — TELEPHONE (OUTPATIENT)
Dept: PHYSICAL MEDICINE AND REHAB | Facility: CLINIC | Age: 57
End: 2022-02-21
Payer: COMMERCIAL

## 2022-04-14 ENCOUNTER — TELEPHONE (OUTPATIENT)
Dept: PHYSICAL MEDICINE AND REHAB | Facility: CLINIC | Age: 57
End: 2022-04-14
Payer: COMMERCIAL

## 2022-04-14 NOTE — TELEPHONE ENCOUNTER
Writer left patient VM regarding reschedule on appt 05/23 Dr. Mayo will be out of the office that day. Appt rescheduled to 05/05/22 at 10:00am. Left our call back number if this doesn't work for the patient.

## 2022-04-15 ENCOUNTER — TELEPHONE (OUTPATIENT)
Dept: PHYSICAL MEDICINE AND REHAB | Facility: CLINIC | Age: 57
End: 2022-04-15
Payer: COMMERCIAL

## 2022-04-15 NOTE — TELEPHONE ENCOUNTER
[facility] Call Center    Phone Message    May a detailed message be left on voicemail: yes     Reason for Call: Other: Pt called with questions about procedure scheduled on 5/5/22 with Dr. Mayo.      Pt is wondering what the name of the procedure is and how long she may need to be off work. She is also wondering how long procedure will take.    Please call Pt back at [phone] to advise. She says leaving a VM is okay.    Action Taken: Message routed to:  [facility] PMR    Travel Screening: Not Applicable

## 2022-04-15 NOTE — TELEPHONE ENCOUNTER
Writer left voice message for patient that the appointment is scheduled at 10a and should take about 30 minutes. I told patient that it looked to be a lidocaine injection, but that was all I was able to find so she should reach out if she has any other questions about the procedure.  Yohana Dooley

## 2022-05-04 ENCOUNTER — TELEPHONE (OUTPATIENT)
Dept: PHYSICAL MEDICINE AND REHAB | Facility: CLINIC | Age: 57
End: 2022-05-04
Payer: COMMERCIAL

## 2022-05-04 NOTE — TELEPHONE ENCOUNTER
[facility] Call Center    Phone Message    May a detailed message be left on voicemail: yes     Reason for Call: Other: Patient said she was offered 5/11 appt as well but wanted to know the times available. Please contact patient to confirm. Scheduled for 5/9 right now.     Action Taken: Message routed to:  [facility]): physical medicine and rehab    Travel Screening: Not Applicable

## 2022-05-04 NOTE — TELEPHONE ENCOUNTER
Writer spoke to patient and let her know that all the slots on 05/11 where taken now. Patient said that was fine and will keep her current date.

## 2022-05-09 ENCOUNTER — OFFICE VISIT (OUTPATIENT)
Dept: PHYSICAL MEDICINE AND REHAB | Facility: CLINIC | Age: 57
End: 2022-05-09
Attending: PSYCHIATRY & NEUROLOGY
Payer: COMMERCIAL

## 2022-05-09 VITALS
SYSTOLIC BLOOD PRESSURE: 117 MMHG | HEART RATE: 70 BPM | OXYGEN SATURATION: 100 % | RESPIRATION RATE: 16 BRPM | DIASTOLIC BLOOD PRESSURE: 73 MMHG

## 2022-05-09 DIAGNOSIS — I87.1 COMPRESSION OF VEIN: ICD-10-CM

## 2022-05-09 DIAGNOSIS — R42 DIZZINESS: ICD-10-CM

## 2022-05-09 DIAGNOSIS — G54.0 TOS (THORACIC OUTLET SYNDROME): ICD-10-CM

## 2022-05-09 DIAGNOSIS — M62.838 MUSCLE SPASM: Primary | ICD-10-CM

## 2022-05-09 PROCEDURE — 96372 THER/PROPH/DIAG INJ SC/IM: CPT | Performed by: PHYSICAL MEDICINE & REHABILITATION

## 2022-05-09 PROCEDURE — 20553 NJX 1/MLT TRIGGER POINTS 3/>: CPT | Mod: 59 | Performed by: PHYSICAL MEDICINE & REHABILITATION

## 2022-05-09 PROCEDURE — 76942 ECHO GUIDE FOR BIOPSY: CPT | Performed by: PHYSICAL MEDICINE & REHABILITATION

## 2022-05-09 RX ORDER — TRAZODONE HYDROCHLORIDE 50 MG/1
TABLET, FILM COATED ORAL AT BEDTIME
COMMUNITY
Start: 2022-04-29

## 2022-05-09 RX ORDER — BENZONATATE 100 MG/1
CAPSULE ORAL
COMMUNITY
Start: 2021-12-30 | End: 2022-07-30

## 2022-05-09 RX ORDER — LIDOCAINE HYDROCHLORIDE 20 MG/ML
6 INJECTION, SOLUTION INFILTRATION; PERINEURAL ONCE
Status: COMPLETED | OUTPATIENT
Start: 2022-05-09 | End: 2022-05-09

## 2022-05-09 RX ORDER — LOSARTAN POTASSIUM 25 MG/1
25 TABLET ORAL
COMMUNITY
Start: 2022-05-04

## 2022-05-09 RX ADMIN — LIDOCAINE HYDROCHLORIDE 6 ML: 20 INJECTION, SOLUTION INFILTRATION; PERINEURAL at 12:27

## 2022-05-09 ASSESSMENT — PAIN SCALES - GENERAL: PAINLEVEL: NO PAIN (0)

## 2022-05-09 NOTE — PROGRESS NOTES
PROCEDURE NOTE: Anterior Scalene Muscle and Sternocleidomastoid Muscle (SCM) Injection Under Ultrasound Guidance    PROCEDURE DATE: 5/9/2022    PATIENT NAME: Oral Singh  YOB: 1965    ATTENDING PHYSICIAN: Lauren Mayo MD  FELLOW/RESIDENT PHYSICIAN: None     PREOPERATIVE DIAGNOSIS:   1. Muscle spasm    2. TOS (thoracic outlet syndrome)    3. Compression of vein    4. Dizziness       POSTOPERATIVE DIAGNOSIS: same    PROCEDURE PERFORMED: Bilateral Anterior Scalene Muscle and Sternocleidomastoid Muscle (SCM) Block Under Ultrasound Guidance    ULTRASOUND WAS USED.     INDICATIONS FOR THE PROCEDURE:  Oral Singh is a 56 year old female who presents with thoracic outlet syndrome.     PROCEDURE AND FINDINGS:  She was greeted in the clinic. The risk, benefits and alternatives to the procedure were again reviewed with her and informed consent was obtained and the patient agreed to proceed. A time-out was performed. Following review alternatives, benefits and risks, the procedure was carried out under sterile prep with sterile gel. The use of direct sonographic guidance was used to ensure accurate placement of the needle (rather than non-guided injection) and required to minimize the risk of bleeding or injury to nearby neurovascular structures.     A 15-6MHz ultrasound transducer was used to visualize the relevant structures and determine the optimal needle path for the procedure. A 27 gauge 1.5 inch needle was advanced utilizing an out-of-plane approach, under continuous ultrasound guidance to the anterior scalene muscle and sternocleidomastoid muscle bilaterally. After negative aspiration, slow injection of the treatment solution 1.5mL total consisting of 2% Lidocaine was instilled into affected area per muscle. The tip of the needle was visualized throughout the procedure. The remainder of the single-use vials were discarded.      Before the procedure, she reported a pain score of 6/10.    After the procedure, she reported a pain score of 6/10.      She tolerated the procedure well, was discharged home in stable condition.     Follow-up will be determined after review of symptom diary/block sheet by Dr. Palomo in Neurology clinic     COMPLICATIONS: None    COMMENTS: None

## 2022-05-09 NOTE — LETTER
5/9/2022       RE: Oral Singh  [address] [place] [address]     Dear Colleague,    Thank you for referring your patient, Oral Singh, to the [facility] PHYSICAL MEDICINE AND REHABILITATION CLINIC [place] at [facility]. Please see a copy of my visit note below.    PROCEDURE NOTE: Anterior Scalene Muscle and Sternocleidomastoid Muscle (SCM) Injection Under Ultrasound Guidance    PROCEDURE DATE: 5/9/2022    PATIENT NAME: Oral Singh  YOB: 1965    ATTENDING PHYSICIAN: Lauren Mayo MD  FELLOW/RESIDENT PHYSICIAN: None     PREOPERATIVE DIAGNOSIS:   1. Muscle spasm    2. TOS (thoracic outlet syndrome)    3. Compression of vein    4. Dizziness       POSTOPERATIVE DIAGNOSIS: same    PROCEDURE PERFORMED: Bilateral Anterior Scalene Muscle and Sternocleidomastoid Muscle (SCM) Block Under Ultrasound Guidance    ULTRASOUND WAS USED.     INDICATIONS FOR THE PROCEDURE:  Oral Singh is a 56 year old female who presents with thoracic outlet syndrome.     PROCEDURE AND FINDINGS:  She was greeted in the clinic. The risk, benefits and alternatives to the procedure were again reviewed with her and informed consent was obtained and the patient agreed to proceed. A time-out was performed. Following review alternatives, benefits and risks, the procedure was carried out under sterile prep with sterile gel. The use of direct sonographic guidance was used to ensure accurate placement of the needle (rather than non-guided injection) and required to minimize the risk of bleeding or injury to nearby neurovascular structures.     A 15-6MHz ultrasound transducer was used to visualize the relevant structures and determine the optimal needle path for the procedure. A 27 gauge 1.5 inch needle was advanced utilizing an out-of-plane approach, under continuous ultrasound guidance to the anterior scalene muscle and sternocleidomastoid muscle  bilaterally. After negative aspiration, slow injection of the treatment solution 1.5mL total consisting of 2% Lidocaine was instilled into affected area per muscle. The tip of the needle was visualized throughout the procedure. The remainder of the single-use vials were discarded.      Before the procedure, she reported a pain score of 6/10.   After the procedure, she reported a pain score of 6/10.      She tolerated the procedure well, was discharged home in stable condition.     Follow-up will be determined after review of symptom diary/block sheet by Dr. Palomo in Neurology clinic     COMPLICATIONS: None    COMMENTS: None       Sincerely,    Lauren Mayo MD

## 2022-05-09 NOTE — TELEPHONE ENCOUNTER
Patient is called she is schedule for a Bilateral SCM + ASM injection with  at 10am,  she is wondering if she will need a  or will she be able to drive herself home after procedure? Please call to advise, [phone].

## 2022-05-09 NOTE — NURSING NOTE
Chief Complaint   Patient presents with     RECHECK     Procedure- Bilateral SCM+ASM Injections     Oskar Dvais

## 2022-05-12 ENCOUNTER — TELEPHONE (OUTPATIENT)
Dept: NEUROSURGERY | Facility: CLINIC | Age: 57
End: 2022-05-12
Payer: COMMERCIAL

## 2022-05-16 ENCOUNTER — HEALTH MAINTENANCE LETTER (OUTPATIENT)
Age: 57
End: 2022-05-16

## 2022-05-19 ENCOUNTER — ANCILLARY PROCEDURE (OUTPATIENT)
Dept: MAMMOGRAPHY | Facility: CLINIC | Age: 57
End: 2022-05-19
Payer: COMMERCIAL

## 2022-05-19 DIAGNOSIS — Z12.31 VISIT FOR SCREENING MAMMOGRAM: ICD-10-CM

## 2022-05-19 PROCEDURE — 77067 SCR MAMMO BI INCL CAD: CPT | Mod: TC | Performed by: RADIOLOGY

## 2022-05-19 PROCEDURE — 77063 BREAST TOMOSYNTHESIS BI: CPT | Mod: TC | Performed by: RADIOLOGY

## 2022-05-23 NOTE — TELEPHONE ENCOUNTER
"Post-Procedure Pain Diary    To determine if the treated area is the source of your pain, today's injection attempted to interrupt this pain signal.  Depending on your response, a different procedure could be considered to treat your pain for a longer duration.  How long the medication last varies from person to person.  What we need to know is how well you did do while the medication was effective.  You should be active during the time the medication is in effect.  DO NOT take pain medication, if you do, please record it.    Please base your response only on the area and/or side injected.  Do not include the pain from other areas of the body or the expected soreness from the needles used during the procedure.    Which side was the procedure performed? Both           Side of body:  Left Side Right Side   Time Pain Score (1-10) Pain Score (1-10)   Before Injection 6 6   Immediately after injection 5 (sitting up)  10 (walking down the hallway) 5  10    3 3   4 hours after injection 4 4   Dinner time     Bed time     Next Day         Comments: Patient    Was it easier to do activities that normally bother you?  (Examples:  walking, flexibility, dishes)  Yes, it was easier to do everyday tasks. Was able to tolerate walking more.         Please call [phone] to report your response the next day or fax your pain diary results ([phone] for [place] or [phone] for [place]). You may also scan the results to the [employer] Chart patient portal and send a message to the provider.  Please inform the call center that you are reporting your \"Pain Diary.\"   "

## 2022-05-27 ENCOUNTER — VIRTUAL VISIT (OUTPATIENT)
Dept: NEUROLOGY | Facility: CLINIC | Age: 57
End: 2022-05-27
Payer: COMMERCIAL

## 2022-05-27 DIAGNOSIS — M62.838 MUSCLE SPASM: ICD-10-CM

## 2022-05-27 DIAGNOSIS — R42 VERTIGO: ICD-10-CM

## 2022-05-27 DIAGNOSIS — M24.20 EAGLE'S SYNDROME: ICD-10-CM

## 2022-05-27 DIAGNOSIS — G43.719 INTRACTABLE CHRONIC MIGRAINE WITHOUT AURA AND WITHOUT STATUS MIGRAINOSUS: Primary | ICD-10-CM

## 2022-05-27 DIAGNOSIS — R42 LIGHTHEADEDNESS: ICD-10-CM

## 2022-05-27 PROBLEM — M75.41 IMPINGEMENT SYNDROME OF RIGHT SHOULDER: Status: ACTIVE | Noted: 2021-10-25

## 2022-05-27 PROBLEM — J45.909 ASTHMA: Status: ACTIVE | Noted: 2021-08-09

## 2022-05-27 PROBLEM — F32.0 MAJOR DEPRESSIVE DISORDER, SINGLE EPISODE, MILD (H): Status: ACTIVE | Noted: 2020-06-10

## 2022-05-27 PROBLEM — F51.01 INSOMNIA, IDIOPATHIC: Status: ACTIVE | Noted: 2019-10-15

## 2022-05-27 PROBLEM — G43.809 MIGRAINE VARIANT: Status: ACTIVE | Noted: 2020-06-10

## 2022-05-27 PROBLEM — Z98.890 H/O ARTHROSCOPIC KNEE SURGERY: Status: ACTIVE | Noted: 2021-08-09

## 2022-05-27 PROBLEM — Q23.88 CONGENITAL MITRAL VALVE PROLAPSE: Status: ACTIVE | Noted: 2021-08-09

## 2022-05-27 PROCEDURE — 99214 OFFICE O/P EST MOD 30 MIN: CPT | Mod: 95 | Performed by: PSYCHIATRY & NEUROLOGY

## 2022-05-27 NOTE — LETTER
"5/27/2022       RE: Oral Singh  [address] [place] [address]     Dear Colleague,    Thank you for referring your patient, Oral Singh, to the [facility] NEUROLOGY CLINIC [place] [facility]. Please see a copy of my visit note below.      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  She also has chronic daily pressure headaches and neck pain.  She has chronic bilateral upper extremity weakness. There is some chronic swelling in the hands but also in the feet more recently.  Paresthesias in the hand and the headache have been worse on the right side by history but on exam today paresthesias are worse in the left hand.  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      She continues to feel that the arms are more tired than the legs. Dizziness is worse with flexion. Tinnitus is worse with head extension. Headache is worse with extension. There is no throat pressure. No problems swallowing.      Ultrasound on 4-13-21 showed elevated right internal jugular vein velocities compared to the left but no areas of obstruction. CT venogram on 7-19-21 showed elongated styloid processes at 3.0/3.1cm bilaterally      She still experiences pressure on the top of the head at least once a week that can last 12-24 hours. It is not worse on one side. She is having the lightheadedness episodes throughout the day. She can trigger several a day triggered by fast head movements. They are lasting about 30-minutes to get back to baseline. This is worse with bending the head forward. She also notes a chronic cough since the beginning of the " year. She coughs throughout the day. She may wake up in the middle of the night with coughing. There have been no voice changes. No pressure in the throat. No pain when swallowing.      A trial of topiramate escalated to 100mg BID did not help, so she is down to 50mg BID. Symptoms did not get worse when she came down. She has not had physical therapy.     We discussed the 2 imaging studies and the relevance that they may have to her current symptoms.  I think it be worthwhile given there conservative nature to try a course of physical therapy addressing the thoracic outlet and sternocleidomastoid area.  We would also consider escalating her diagnostic testing with a referral to my colleagues in physical medicine and rehab for a muscle block.  She also notes that her lightheadedness is significantly worse with head flexion.  Given enough time past since her last CT scan she is agreeable to try to CT venogram in the head flexed position which I have ordered.  This may help us with choosing all the correct muscles to inject if she does end up being referred for a muscle block.     Plan:  1. Physical therapy for thoracic outlet as well sternocleidomastoid area  2. CT venogram in the neutral and head flexed positions.   3. Consider trial muscle block with PM&R.      Interim History 1-28-22:  She has had 8 sessions of PT through December 2021. She would better on the day of the session but she would revert to her baseline sessions the next day. She didn't see any sustained improvement in the headache or dizziness but she does still do some neck stretches at home because the neck feels better.      She still has a chronic daily pressure headaches mostly in the front of the head but can be at the base of the skull. There have been no spinning very episodes since our last visit. But she continues to experience the lightheadedness mostly in the upright position, sitting and standing but better laying down.      There is still  arm weakness but not so much the tingling.   We discussed the plan going forward to escalate to trying muscle blocks and Botox. She is agreeable with this plan.     Interim History 5-27-22:  5-9-22: PROCEDURE NOTE: Anterior Scalene Muscle and Sternocleidomastoid Muscle (SCM) Injection Under Ultrasound Guidance.  Before the procedure, she reported a pain score of 6/10.   After the procedure, she reported a pain score of 6/10.     She was fine in the room, but once she got out into the aranda-way she became very faint which lasted until she sat down. It lasted for about 5-minutes. She was able to drive home. For the rest of the day she felt better than her baseline. The lightheadedness/disorientation/pressure decreased it from 7/10 to 3/10 after two hours and it was 4/10 at 4 hours. She was back at her baseline by the evening. The next morning was the same as other morning. There was no neck tenderness. She is having arm tingling in the right once in a while, not too bad.     Was seen at the [facility] for question of CSF leak. CSF cisternogram from 1-18-21 was reviewed which was negative for a leak. She was a diagnosis of possible vestibular neuritis, vestibular migraine, and PPPD. Was referred to Jasmin Aceves in ENT with whom she has an appointment on 12-1-22.     She is still on topiramate 50mg BID. She is done with physical therapy. She is not taking sumatriptan usually, only for super lightheadedness. Her symptoms are particularly bad with head flexion triggering faintness. Head extension is okay. Also, laying on the right side will trigger faintness. There is no globus sensation. No throat pressure.     Plan:  1. Request Botox chronic migraine (failed topiramate and amitriptyline).   2. If insufficiently helpful, would request vascular surgery evaluation for potential left styloid resection.       Current Outpatient Medications:      acetaminophen (TYLENOL) 500 MG tablet, Take 500 mg by mouth,  Disp: , Rfl:      beclomethasone HFA (QVAR REDIHALER) 80 MCG/ACT inhaler, Inhale 2 puffs into the lungs, Disp: , Rfl:      benzonatate (TESSALON) 100 MG capsule, , Disp: , Rfl:      cholecalciferol 125 MCG (5000 UT) CAPS, Take 5,000 Units by mouth, Disp: , Rfl:      estradiol (ESTRACE) 0.1 MG/GM vaginal cream, , Disp: , Rfl:      fish oil-omega-3 fatty acids 1000 MG capsule, Take 2 g by mouth, Disp: , Rfl:      losartan (COZAAR) 25 MG tablet, Take 25 mg by mouth, Disp: , Rfl:      Magnesium Oxide 500 MG TABS, Take 500 mg by mouth, Disp: , Rfl:      Multiple Vitamins-Minerals (CENTRUM SILVER 50+WOMEN) TABS, daily, Disp: , Rfl:      ondansetron (ZOFRAN-ODT) 4 MG ODT tab, Place 4 mg under the tongue, Disp: , Rfl:      promethazine (PHENERGAN) 25 MG tablet, Take 1/2 to 1 tablet by mouth for nausea, Disp: 30 tablet, Rfl: 3     SUMAtriptan (IMITREX) 50 MG tablet, as needed, Disp: , Rfl:      topiramate (TOPAMAX) 25 MG capsule, 75 mg daily, Disp: , Rfl:      traZODone (DESYREL) 50 MG tablet, At Bedtime, Disp: , Rfl:      Turmeric 500 MG CAPS, , Disp: , Rfl:     DATA:  I personally reviewed the following data.  Last brain imagin-minutes were spent in evaluation, examination, and counseling as well as documentation on the date of service. After a review of the patient s situation, this visit was changed from an in-person visit to a video visit to reduce the risk of COVID-19 exposure.        Sincerely,    Toshia CAMARILLO Cha, MD

## 2022-05-27 NOTE — PROGRESS NOTES
"The patient is being evaluated via a billable video visit.    How would you like to obtain your AVS? MyChart  If the video visit is dropped, the invitation should be resent by: Send to e-mail at: [email or web address]  Will anyone else be joining your video visit? No      Video-Visit Details  Type of service:  Video Visit  Video Start Time:3:26 PM  Video End Time:3:47 PM  Originating Location (pt. Location): Home  Distant Location (provider location):  [facility] NEUROLOGY [facility]   Platform used for Video Visit: Vidapp    Follow-up 7-7-21, 10-8-21, 1-28-22:  Oral Singh is a 56 year old female with PMH significant for left ear deafness with episodes of vertigo with now head triggered symptoms of lightheadedness and \"whooshing,\" sensations.  She also has chronic daily pressure headaches and neck pain.  She has chronic bilateral upper extremity weakness. There is some chronic swelling in the hands but also in the feet more recently.  Paresthesias in the hand and the headache have been worse on the right side by history but on exam today paresthesias are worse in the left hand.  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      She continues to feel that the arms are more tired than the legs. Dizziness is worse with flexion. Tinnitus is worse with head extension. Headache is worse with extension. There is no throat pressure. No problems swallowing.      Ultrasound on 4-13-21 showed elevated right internal jugular vein velocities compared to the left but no areas of obstruction. CT venogram on 7-19-21 showed elongated styloid processes at 3.0/3.1cm bilaterally      She still experiences pressure on the top of the head at least once a week that can last 12-24 hours. It is not worse on one side. She is having the lightheadedness episodes throughout the day. " She can trigger several a day triggered by fast head movements. They are lasting about 30-minutes to get back to baseline. This is worse with bending the head forward. She also notes a chronic cough since the beginning of the year. She coughs throughout the day. She may wake up in the middle of the night with coughing. There have been no voice changes. No pressure in the throat. No pain when swallowing.      A trial of topiramate escalated to 100mg BID did not help, so she is down to 50mg BID. Symptoms did not get worse when she came down. She has not had physical therapy.     We discussed the 2 imaging studies and the relevance that they may have to her current symptoms.  I think it be worthwhile given there conservative nature to try a course of physical therapy addressing the thoracic outlet and sternocleidomastoid area.  We would also consider escalating her diagnostic testing with a referral to my colleagues in physical medicine and rehab for a muscle block.  She also notes that her lightheadedness is significantly worse with head flexion.  Given enough time past since her last CT scan she is agreeable to try to CT venogram in the head flexed position which I have ordered.  This may help us with choosing all the correct muscles to inject if she does end up being referred for a muscle block.     Plan:  1. Physical therapy for thoracic outlet as well sternocleidomastoid area  2. CT venogram in the neutral and head flexed positions.   3. Consider trial muscle block with PM&R.      Interim History 1-28-22:  CT venogram 10-9-21:  Styloid process measures 2.9 cm on the right.Styloid process measures 3.5 cm on the left. Mild right and moderate left upper internal jugular venous narrowing with flexion.    She has had 8 sessions of PT through December 2021. She would better on the day of the session but she would revert to her baseline sessions the next day. She didn't see any sustained improvement in the headache or  dizziness but she does still do some neck stretches at home because the neck feels better.      She still has a chronic daily pressure headaches mostly in the front of the head but can be at the base of the skull. There have been no spinning very episodes since our last visit. But she continues to experience the lightheadedness mostly in the upright position, sitting and standing but better laying down.      There is still arm weakness but not so much the tingling.   We discussed the plan going forward to escalate to trying muscle blocks and Botox. She is agreeable with this plan.     Interim History 5-27-22:  5-9-22: PROCEDURE NOTE: Anterior Scalene Muscle and Sternocleidomastoid Muscle (SCM) Injection Under Ultrasound Guidance.  Before the procedure, she reported a pain score of 6/10.   After the procedure, she reported a pain score of 6/10.     She was fine in the room, but once she got out into the aranda-way she became very faint which lasted until she sat down. It lasted for about 5-minutes. She was able to drive home. For the rest of the day she felt better than her baseline. The lightheadedness/disorientation/pressure decreased it from 7/10 to 3/10 after two hours and it was 4/10 at 4 hours. She was back at her baseline by the evening. The next morning was the same as other morning. There was no neck tenderness. She is having arm tingling in the right once in a while, not too bad.     Was seen at the [facility] for question of CSF leak. CSF cisternogram from 1-18-21 was reviewed which was negative for a leak. She was a diagnosis of possible vestibular neuritis, vestibular migraine, and PPPD. Was referred to Jasmin Aceves in ENT with whom she has an appointment on 12-1-22.     She is still on topiramate 50mg BID. She is done with physical therapy. She is not taking sumatriptan usually, only for super lightheadedness. Her symptoms are particularly bad with head flexion triggering faintness.  Head extension is okay. Also, laying on the right side will trigger faintness. There is no globus sensation. No throat pressure.     Plan:  1. Request Botox chronic migraine (failed topiramate and amitriptyline).   2. If insufficiently helpful, would request vascular surgery evaluation for potential left styloid resection.       Current Outpatient Medications:      acetaminophen (TYLENOL) 500 MG tablet, Take 500 mg by mouth, Disp: , Rfl:      beclomethasone HFA (QVAR REDIHALER) 80 MCG/ACT inhaler, Inhale 2 puffs into the lungs, Disp: , Rfl:      benzonatate (TESSALON) 100 MG capsule, , Disp: , Rfl:      cholecalciferol 125 MCG (5000 UT) CAPS, Take 5,000 Units by mouth, Disp: , Rfl:      estradiol (ESTRACE) 0.1 MG/GM vaginal cream, , Disp: , Rfl:      fish oil-omega-3 fatty acids 1000 MG capsule, Take 2 g by mouth, Disp: , Rfl:      losartan (COZAAR) 25 MG tablet, Take 25 mg by mouth, Disp: , Rfl:      Magnesium Oxide 500 MG TABS, Take 500 mg by mouth, Disp: , Rfl:      Multiple Vitamins-Minerals (CENTRUM SILVER 50+WOMEN) TABS, daily, Disp: , Rfl:      ondansetron (ZOFRAN-ODT) 4 MG ODT tab, Place 4 mg under the tongue, Disp: , Rfl:      promethazine (PHENERGAN) 25 MG tablet, Take 1/2 to 1 tablet by mouth for nausea, Disp: 30 tablet, Rfl: 3     SUMAtriptan (IMITREX) 50 MG tablet, as needed, Disp: , Rfl:      topiramate (TOPAMAX) 25 MG capsule, 75 mg daily, Disp: , Rfl:      traZODone (DESYREL) 50 MG tablet, At Bedtime, Disp: , Rfl:      Turmeric 500 MG CAPS, , Disp: , Rfl:     DATA:  I personally reviewed the following data.  Last brain imaging 10-9-21:        34-minutes were spent in evaluation, examination, and counseling as well as documentation on the date of service. After a review of the patient s situation, this visit was changed from an in-person visit to a video visit to reduce the risk of COVID-19 exposure.

## 2022-05-27 NOTE — Clinical Note
5/27/2022       RE: Oral Singh  [address] [place] [address]     Dear Colleague,    Thank you for referring your patient, Oral Singh, to the [facility] NEUROLOGY CLINIC [facility]. Please see a copy of my visit note below.    No notes on file    Again, thank you for allowing me to participate in the care of your patient.      Sincerely,    Toshia CAMARILLO Cha, MD

## 2022-05-31 DIAGNOSIS — G43.719 INTRACTABLE CHRONIC MIGRAINE WITHOUT AURA: Primary | ICD-10-CM

## 2022-06-07 ENCOUNTER — TELEPHONE (OUTPATIENT)
Dept: NEUROLOGY | Facility: CLINIC | Age: 57
End: 2022-06-07
Payer: COMMERCIAL

## 2022-06-08 ENCOUNTER — TELEPHONE (OUTPATIENT)
Dept: NEUROLOGY | Facility: CLINIC | Age: 57
End: 2022-06-08
Payer: COMMERCIAL

## 2022-06-08 NOTE — TELEPHONE ENCOUNTER
I called pt back. I let her know we plan to appeal botox denial. Dr. Palomo is out of the country and will be back June 20th. We will start appeal when she is back. Pt understands. We reviewed if appeal is denied we will have option for second appeal as well.     Pt is comfortable with this plan.     Ene LUGO

## 2022-06-08 NOTE — TELEPHONE ENCOUNTER
[facility] Call [facility]    Phone Message    May a detailed message be left on voicemail: yes     Reason for Call: Other: Rin calling to inform Dr. Palomo that she received a letter in the mail stating she was denied for botox. She is inquiring what the next steps are. Please call Rin at your earliest convenience to discuss.      Action Taken: Message routed to:  [facility] [place] [facility]    Travel Screening: Not Applicable

## 2022-07-18 ENCOUNTER — DOCUMENTATION ONLY (OUTPATIENT)
Dept: PHYSICAL MEDICINE AND REHAB | Facility: CLINIC | Age: 57
End: 2022-07-18

## 2022-07-18 NOTE — PROGRESS NOTES
[facility]  Auth # [id number] sent letter for scanning.    Thanks,   Antonio

## 2022-07-30 ENCOUNTER — OFFICE VISIT (OUTPATIENT)
Dept: URGENT CARE | Facility: URGENT CARE | Age: 57
End: 2022-07-30
Payer: COMMERCIAL

## 2022-07-30 VITALS
DIASTOLIC BLOOD PRESSURE: 76 MMHG | SYSTOLIC BLOOD PRESSURE: 124 MMHG | HEART RATE: 70 BPM | TEMPERATURE: 98.1 F | OXYGEN SATURATION: 99 %

## 2022-07-30 DIAGNOSIS — N76.2 CELLULITIS OF LABIA MAJORA: ICD-10-CM

## 2022-07-30 DIAGNOSIS — R10.2 VAGINAL PAIN: Primary | ICD-10-CM

## 2022-07-30 DIAGNOSIS — B96.89 BACTERIAL VAGINOSIS: ICD-10-CM

## 2022-07-30 DIAGNOSIS — N76.0 BACTERIAL VAGINOSIS: ICD-10-CM

## 2022-07-30 LAB
ALBUMIN UR-MCNC: NEGATIVE MG/DL
APPEARANCE UR: CLEAR
BILIRUB UR QL STRIP: NEGATIVE
CLUE CELLS: PRESENT
COLOR UR AUTO: YELLOW
GLUCOSE UR STRIP-MCNC: NEGATIVE MG/DL
HGB UR QL STRIP: NEGATIVE
KETONES UR STRIP-MCNC: NEGATIVE MG/DL
LEUKOCYTE ESTERASE UR QL STRIP: NEGATIVE
NITRATE UR QL: NEGATIVE
PH UR STRIP: 7.5 [PH] (ref 5–7)
SP GR UR STRIP: 1.01 (ref 1–1.03)
TRICHOMONAS, WET PREP: ABNORMAL
UROBILINOGEN UR STRIP-ACNC: 0.2 E.U./DL
WBC'S/HIGH POWER FIELD, WET PREP: ABNORMAL
YEAST, WET PREP: ABNORMAL

## 2022-07-30 PROCEDURE — 81003 URINALYSIS AUTO W/O SCOPE: CPT

## 2022-07-30 PROCEDURE — 99203 OFFICE O/P NEW LOW 30 MIN: CPT | Performed by: FAMILY MEDICINE

## 2022-07-30 PROCEDURE — 87210 SMEAR WET MOUNT SALINE/INK: CPT | Performed by: FAMILY MEDICINE

## 2022-07-30 RX ORDER — METRONIDAZOLE 500 MG/1
500 TABLET ORAL 2 TIMES DAILY
Qty: 14 TABLET | Refills: 0 | Status: SHIPPED | OUTPATIENT
Start: 2022-07-30 | End: 2022-08-06

## 2022-07-30 RX ORDER — CEPHALEXIN 500 MG/1
500 CAPSULE ORAL 3 TIMES DAILY
Qty: 21 CAPSULE | Refills: 0 | Status: SHIPPED | OUTPATIENT
Start: 2022-07-30 | End: 2022-08-06

## 2022-07-30 ASSESSMENT — PAIN SCALES - GENERAL: PAINLEVEL: EXTREME PAIN (9)

## 2022-07-30 NOTE — PROGRESS NOTES
SUBJECTIVE:   Oral Singh is a 57 year old female presenting with new redness without tenderness at the bilateral labia (majora) since yesterday morning.  The new lesion seems to be warm to touch.      Patient has been applying a steroid cream onto the labia; however, the labia have become red lately.        Patient was evaluated at the [facility] on July 5, 2022, for a two-week history of pain, redness and worsening vaginal diuscomfort.  The exam revealed mild inflammation just below the urethral opening and no urethral discharge.  No caruncle was seen.  Patient was started on Amoxicillin and was told to continue the Estradiol cream 2-3 times/week per the patient's OB/Gyn.      Patient returned to the [facility] on July 14, 2022, for waxing and waning moderate pain and redness at the urethra and no symptom relief with the Amoxicillin.  The exam showed that, just anterior to the urethral opening, there was patch of redness and possible punctate openings of skin.  The urethral opening had mild erythema surrounding it.  The UA showed white cell clumps, trace leukocyte esterase, trace blood.  The HSV and Varicella Zoster PCR tests were negative.  The genital mycoplasma test was negative.  Patient was prescribed Lidocaine 4% topical gel. Patient was told to continue the estradiol cream.  Patient was given a referral to see a urogynecologist for further evaluation.      On July 19, 2022, patient was evaluated by a urogynecologist at the [facility].  The exam revealed the following findings:  Below the clitoris there were several clustered tender vesicle-like lesions and ulcerated vesicle-like lesion below the urethra without erythema.  The bladder scan revealed a postvoid residual volume of 97mL.  Patient was told to continue the vaginal estrogen cream and patient was recommended to see a general gynecologist.     Patient consulted a gynecologist on July 20, 2022 at the  [facility].  The yeast culture was negative for yeast.      Past Medical History:   Diagnosis Date     Arthritis      Current Outpatient Medications   Medication Sig Dispense Refill     acetaminophen (TYLENOL) 500 MG tablet Take 500 mg by mouth       beclomethasone HFA (QVAR REDIHALER) 80 MCG/ACT inhaler Inhale 2 puffs into the lungs       cholecalciferol 125 MCG (5000 UT) CAPS Take 5,000 Units by mouth       estradiol (ESTRACE) 0.1 MG/GM vaginal cream        fish oil-omega-3 fatty acids 1000 MG capsule Take 2 g by mouth       losartan (COZAAR) 25 MG tablet Take 25 mg by mouth       Magnesium Oxide 500 MG TABS Take 500 mg by mouth       Multiple Vitamins-Minerals (CENTRUM SILVER 50+WOMEN) TABS daily       ondansetron (ZOFRAN-ODT) 4 MG ODT tab Place 4 mg under the tongue       promethazine (PHENERGAN) 25 MG tablet Take 1/2 to 1 tablet by mouth for nausea 30 tablet 3     SUMAtriptan (IMITREX) 50 MG tablet as needed       topiramate (TOPAMAX) 25 MG capsule 75 mg daily       traZODone (DESYREL) 50 MG tablet At Bedtime       Turmeric 500 MG CAPS        Social History     Tobacco Use     Smoking status: Never Smoker     Smokeless tobacco: Never Used   Substance Use Topics     Alcohol use: Yes       ROS:  CONSTITUTIONAL:negative for fevers.   :  Positive for redness, swelling at the labia majora.      OBJECTIVE:  /76   Pulse 70   Temp 98.1  F (36.7  C) (Tympanic)   LMP  (LMP Unknown)   SpO2 99%   Breastfeeding No   GENERAL APPEARANCE: healthy, alert and no distress  GU_female: positive findings: the bilateral labia majora are edematous with confluent erythema  No crusts/vesicles/pustules are on the bilateral labia majora.  , exam chaperoned by nurse    ASSESSMENT:  Cellulitis of the bilateral labia majora  Bacterial vaginosis  Vaginal Pain    PLAN:    For the Cellulitis:  Rx:  Cephalexin    For the Bacterial Vaginosis:  Rx:  Metronidazole    Patient will follow up with the  gynecologist in 2 days.      Polo Skaggs MD

## 2022-08-16 ENCOUNTER — TRANSFERRED RECORDS (OUTPATIENT)
Dept: HEALTH INFORMATION MANAGEMENT | Facility: CLINIC | Age: 57
End: 2022-08-16

## 2022-08-19 ENCOUNTER — TRANSCRIBE ORDERS (OUTPATIENT)
Dept: OTHER | Age: 57
End: 2022-08-19

## 2022-08-19 ENCOUNTER — TELEPHONE (OUTPATIENT)
Dept: NEUROLOGY | Facility: CLINIC | Age: 57
End: 2022-08-19

## 2022-08-19 DIAGNOSIS — R42 DIZZINESS AND GIDDINESS: Primary | ICD-10-CM

## 2022-08-19 NOTE — TELEPHONE ENCOUNTER
I called pt back to discuss her message. She said she saw an ENT [place] provider Dr. Milly Lopezems this Tuesday to discuss recent migraines she has had secondary to ear pressure. The ENT provider thought this might be vestibular migraine. They suggested she reach out to her PCP about starting a anti-anxiety medication for management of her migraines. Pt does not have a PCP. She said in the past Dr. Palomo has discussed this idea and she was not open to trying it. She is now ready to start an anti-anxiety medication for migraine and wondering if Dr. Palomo would prescribe something.     Pt has follow up with Dr. Palomo in September. I will update Dr. Palomo and update pt on her recommendations.     Ene LUGO

## 2022-08-19 NOTE — TELEPHONE ENCOUNTER
I called pt back to let her know Dr. Palomo would like to review the records/office visit note from Dr. Olivia prior to recommending a new medication. Pt provided me with the ENT [place] phone: [phone]. I will call to request this office note. Pt said she saw Dr. Sania Olivia ENT on August 16th.     I left a voicemail with the medical records department at [facility]. I asked they please fax a copy of the office note to our clinic [phone] Attn: Ene LUGO and Dr. Palomo. They can call me with any questions or if need approval can call pt to discuss.     Ene LUGO

## 2022-08-19 NOTE — TELEPHONE ENCOUNTER
Health Call Center    Phone Message    May a detailed message be left on voicemail: yes     Reason for Call:     Patient called to speak to provider about discuss mediation that is needed for her migraines, per patient it was suggested by [facility] [place] provider, Dr. Milly Figueredo. Patient unable to provide name of medication to writer. Patient can be reached anytime on her cell.    Action Taken: Message routed to:  [facility] neurology    Travel Screening: Not Applicable

## 2022-08-22 ENCOUNTER — TRANSCRIBE ORDERS (OUTPATIENT)
Dept: OTHER | Age: 57
End: 2022-08-22

## 2022-08-24 ENCOUNTER — OFFICE VISIT (OUTPATIENT)
Dept: URGENT CARE | Facility: URGENT CARE | Age: 57
End: 2022-08-24
Payer: COMMERCIAL

## 2022-08-24 VITALS
RESPIRATION RATE: 16 BRPM | OXYGEN SATURATION: 100 % | HEART RATE: 66 BPM | SYSTOLIC BLOOD PRESSURE: 118 MMHG | TEMPERATURE: 97.6 F | DIASTOLIC BLOOD PRESSURE: 68 MMHG

## 2022-08-24 DIAGNOSIS — R35.0 URINARY FREQUENCY: Primary | ICD-10-CM

## 2022-08-24 DIAGNOSIS — N39.0 URINARY TRACT INFECTION WITHOUT HEMATURIA, SITE UNSPECIFIED: ICD-10-CM

## 2022-08-24 DIAGNOSIS — N76.0 VAGINITIS AND VULVOVAGINITIS: ICD-10-CM

## 2022-08-24 LAB
ALBUMIN UR-MCNC: NEGATIVE MG/DL
AMORPH CRY #/AREA URNS HPF: ABNORMAL /HPF
APPEARANCE UR: ABNORMAL
BACTERIA #/AREA URNS HPF: ABNORMAL /HPF
BILIRUB UR QL STRIP: NEGATIVE
CLUE CELLS: ABNORMAL
COLOR UR AUTO: YELLOW
GLUCOSE UR STRIP-MCNC: NEGATIVE MG/DL
HGB UR QL STRIP: ABNORMAL
KETONES UR STRIP-MCNC: NEGATIVE MG/DL
LEUKOCYTE ESTERASE UR QL STRIP: ABNORMAL
NITRATE UR QL: NEGATIVE
PH UR STRIP: 7.5 [PH] (ref 5–7)
RBC #/AREA URNS AUTO: ABNORMAL /HPF
SP GR UR STRIP: 1.01 (ref 1–1.03)
SQUAMOUS #/AREA URNS AUTO: ABNORMAL /LPF
TRICHOMONAS, WET PREP: ABNORMAL
UROBILINOGEN UR STRIP-ACNC: 0.2 E.U./DL
WBC #/AREA URNS AUTO: ABNORMAL /HPF
WBC'S/HIGH POWER FIELD, WET PREP: ABNORMAL
YEAST, WET PREP: ABNORMAL

## 2022-08-24 PROCEDURE — 87086 URINE CULTURE/COLONY COUNT: CPT | Performed by: FAMILY MEDICINE

## 2022-08-24 PROCEDURE — 87210 SMEAR WET MOUNT SALINE/INK: CPT | Performed by: FAMILY MEDICINE

## 2022-08-24 PROCEDURE — 99214 OFFICE O/P EST MOD 30 MIN: CPT | Performed by: FAMILY MEDICINE

## 2022-08-24 PROCEDURE — 81001 URINALYSIS AUTO W/SCOPE: CPT | Performed by: FAMILY MEDICINE

## 2022-08-24 RX ORDER — METRONIDAZOLE 500 MG/1
500 TABLET ORAL 2 TIMES DAILY
Qty: 14 TABLET | Refills: 0 | Status: SHIPPED | OUTPATIENT
Start: 2022-08-24 | End: 2022-08-31

## 2022-08-24 RX ORDER — CEFDINIR 300 MG/1
300 CAPSULE ORAL 2 TIMES DAILY
Qty: 10 CAPSULE | Refills: 0 | Status: SHIPPED | OUTPATIENT
Start: 2022-08-24 | End: 2022-08-29

## 2022-08-24 NOTE — PROGRESS NOTES
SUBJECTIVE:   Oral Singh is a 57 year old female who  presents today for a possible UTI.      Had been struggling with urethritis symptoms since June.  Had tried lidocaine, then steroid.  Ended up being referred to Urologist and then OB/GYN.  Had been applying steroid cream but developed burning sensation again.    Seen in UC and had wet prep done which showed BV infection.  Treated with RX Flagyl and did endorse improvement.  Here today as feels similar burning sensation again.    Has been using estradiol treatment for many years, thinks that the applicator may be contaminated and was trying to clean applicator and notice that was having more symptoms after using it.  Patient had thrown this cream out and planning to use a new one next time.    Past Medical History:   Diagnosis Date     Arthritis      Current Outpatient Medications   Medication Sig Dispense Refill     acetaminophen (TYLENOL) 500 MG tablet Take 500 mg by mouth       beclomethasone HFA (QVAR REDIHALER) 80 MCG/ACT inhaler Inhale 2 puffs into the lungs       cholecalciferol 125 MCG (5000 UT) CAPS Take 5,000 Units by mouth       estradiol (ESTRACE) 0.1 MG/GM vaginal cream        fish oil-omega-3 fatty acids 1000 MG capsule Take 2 g by mouth       losartan (COZAAR) 25 MG tablet Take 25 mg by mouth       Magnesium Oxide 500 MG TABS Take 500 mg by mouth       Multiple Vitamins-Minerals (CENTRUM SILVER 50+WOMEN) TABS daily       ondansetron (ZOFRAN-ODT) 4 MG ODT tab Place 4 mg under the tongue       promethazine (PHENERGAN) 25 MG tablet Take 1/2 to 1 tablet by mouth for nausea 30 tablet 3     SUMAtriptan (IMITREX) 50 MG tablet as needed       topiramate (TOPAMAX) 25 MG capsule 75 mg daily       traZODone (DESYREL) 50 MG tablet At Bedtime       Turmeric 500 MG CAPS        Social History     Tobacco Use     Smoking status: Never Smoker     Smokeless tobacco: Never Used   Substance Use Topics     Alcohol use: Yes       ROS:   Review of systems negative  except as stated above.    OBJECTIVE:  /68   Pulse 66   Temp 97.6  F (36.4  C) (Tympanic)   Resp 16   LMP  (LMP Unknown)   SpO2 100%   GENERAL APPEARANCE: healthy, alert and no distress  PSYCH: mentation appears normal and affect normal/bright    Results for orders placed or performed in visit on 08/24/22   UA reflex to Microscopic and Culture     Status: Abnormal    Specimen: Urine, Clean Catch   Result Value Ref Range    Color Urine Yellow Colorless, Straw, Light Yellow, Yellow    Appearance Urine Cloudy (A) Clear    Glucose Urine Negative Negative mg/dL    Bilirubin Urine Negative Negative    Ketones Urine Negative Negative mg/dL    Specific Gravity Urine 1.015 1.003 - 1.035    Blood Urine Small (A) Negative    pH Urine 7.5 (H) 5.0 - 7.0    Protein Albumin Urine Negative Negative mg/dL    Urobilinogen Urine 0.2 0.2, 1.0 E.U./dL    Nitrite Urine Negative Negative    Leukocyte Esterase Urine Moderate (A) Negative   Urine Microscopic Exam     Status: Abnormal   Result Value Ref Range    Bacteria Urine Few (A) None Seen /HPF    RBC Urine 10-25 (A) 0-2 /HPF /HPF    WBC Urine 10-25 (A) 0-5 /HPF /HPF    Squamous Epithelials Urine Few (A) None Seen /LPF    Amorphous Crystals Urine Few (A) None Seen /HPF   Wet preparation     Status: Abnormal    Specimen: Vagina; Swab   Result Value Ref Range    Trichomonas Absent Absent    Yeast Absent Absent    Clue Cells Absent Absent    WBCs/high power field 2+ (A) None       ASSESSMENT/PLAN:   (R35.0) Urinary frequency  (primary encounter diagnosis)  Plan: UA reflex to Microscopic and Culture, Wet         preparation, Urine Microscopic Exam, Urine         Culture            (N76.0) Vaginitis and vulvovaginitis  Plan: metroNIDAZOLE (FLAGYL) 500 MG tablet            (N39.0) Urinary tract infection without hematuria, site unspecified  Plan: cefdinir (OMNICEF) 300 MG capsule            Reviewed initial labs and discussed probable UTI this time, empiric treatment with RX  Omnicef given for treatment.  Will follow up on urine culture and adjust medication if needed.  Drink plenty of fluids.     Discussed BV and vaginal symptoms and urethritis, okay to repeat treatment in case not adequate sample - RX Flagyl given for empiric coverage if symptoms not improving with antibiotic treatment for UTI.  No alcohol with Flagyl medication.    Follow up with primary provider if no improvement of symptoms in 1-2 weeks    Kd Santoro MD  August 24, 2022 6:32 PM

## 2022-08-25 ENCOUNTER — OFFICE VISIT (OUTPATIENT)
Dept: URGENT CARE | Facility: URGENT CARE | Age: 57
End: 2022-08-25
Payer: COMMERCIAL

## 2022-08-25 VITALS
DIASTOLIC BLOOD PRESSURE: 68 MMHG | OXYGEN SATURATION: 100 % | RESPIRATION RATE: 18 BRPM | HEART RATE: 57 BPM | SYSTOLIC BLOOD PRESSURE: 118 MMHG | TEMPERATURE: 97.8 F

## 2022-08-25 DIAGNOSIS — M79.10 MYALGIA: ICD-10-CM

## 2022-08-25 DIAGNOSIS — S40.021A ARM BRUISE, RIGHT, INITIAL ENCOUNTER: Primary | ICD-10-CM

## 2022-08-25 PROCEDURE — 99213 OFFICE O/P EST LOW 20 MIN: CPT | Performed by: FAMILY MEDICINE

## 2022-08-25 ASSESSMENT — PAIN SCALES - GENERAL: PAINLEVEL: NO PAIN (1)

## 2022-08-25 NOTE — PROGRESS NOTES
SUBJECTIVE:  Chief Complaint   Patient presents with     Bruise     Painful bruising on neck extending to R shoulder and chest. Pt unaware of how bruising was acquired.      Oral Singh is a 57 year old female who presents with a chief complaint of painful bruise on neck, right shoulder and chest.    Complain of collar bone feels more tender and felt a bump on left chest area when showering today.  Noted a bruise on right shoulder.    Denies any trauma to area, did recall minor injury a few days ago but did not hit anything.  Able to move shoulder without any problems.    Had mammogram about 6 months ago, normal.    Past Medical History:   Diagnosis Date     Arthritis      Current Outpatient Medications   Medication Sig Dispense Refill     acetaminophen (TYLENOL) 500 MG tablet Take 500 mg by mouth       beclomethasone HFA (QVAR REDIHALER) 80 MCG/ACT inhaler Inhale 2 puffs into the lungs       cefdinir (OMNICEF) 300 MG capsule Take 1 capsule (300 mg) by mouth 2 times daily for 5 days 10 capsule 0     cholecalciferol 125 MCG (5000 UT) CAPS Take 5,000 Units by mouth       estradiol (ESTRACE) 0.1 MG/GM vaginal cream        fish oil-omega-3 fatty acids 1000 MG capsule Take 2 g by mouth       losartan (COZAAR) 25 MG tablet Take 25 mg by mouth       Magnesium Oxide 500 MG TABS Take 500 mg by mouth       metroNIDAZOLE (FLAGYL) 500 MG tablet Take 1 tablet (500 mg) by mouth 2 times daily for 7 days 14 tablet 0     Multiple Vitamins-Minerals (CENTRUM SILVER 50+WOMEN) TABS daily       ondansetron (ZOFRAN-ODT) 4 MG ODT tab Place 4 mg under the tongue       promethazine (PHENERGAN) 25 MG tablet Take 1/2 to 1 tablet by mouth for nausea 30 tablet 3     SUMAtriptan (IMITREX) 50 MG tablet as needed       topiramate (TOPAMAX) 25 MG capsule 75 mg daily       traZODone (DESYREL) 50 MG tablet At Bedtime       Turmeric 500 MG CAPS        Social History     Tobacco Use     Smoking status: Never Smoker     Smokeless tobacco: Never  Used   Substance Use Topics     Alcohol use: Yes       ROS:  Review of systems negative except as stated above.    EXAM:   /68   Pulse 57   Temp 97.8  F (36.6  C) (Tympanic)   Resp 18   LMP  (LMP Unknown)   SpO2 100%   M/S Exam:right shoulder - ROM intact.  ecchymosis patch on anterior aspect of humerus.  No erythema, no warmth, no tenderness  GENERAL APPEARANCE: healthy, alert and no distress  EXTREMITIES: peripheral pulses normal  SKIN: anterior chest wall left side below collarbone with small cyst, mobile, mild tenderness.  No erythema, no vesicles, no scaling  PSYCH:alert, affect bright    X-RAY was not done.    ASSESSMENT/PLAN:  (S40.021A) Arm bruise, right, initial encounter  (primary encounter diagnosis)  Plan: warm pack, monitor    (M79.10) Myalgia  Comment: left anterior chest/below clavicle  Plan: ice, monitor      Reassurance given, reviewed that right shoulder has typical bruise appearance and may have occur with minor injury or spontaneous blood vessel popping.  Okay to apply warm compress to area and monitor.  Reviewed area of anterior chest wall on left below clavicle may be a small cyst, no concerns for infection and okay to monitor.  Okay to apply ice to area.    Follow up with primary provider if no improvement of symptoms in 1 week    Kd Santoro MD  August 25, 2022 6:23 PM

## 2022-08-26 LAB — BACTERIA UR CULT: NO GROWTH

## 2022-09-01 ENCOUNTER — HOSPITAL ENCOUNTER (OUTPATIENT)
Dept: OCCUPATIONAL THERAPY | Facility: REHABILITATION | Age: 57
Discharge: HOME | End: 2022-09-01
Payer: COMMERCIAL

## 2022-09-01 DIAGNOSIS — Z78.9 DECREASED ACTIVITIES OF DAILY LIVING (ADL): ICD-10-CM

## 2022-09-01 DIAGNOSIS — R42 DIZZINESS: Primary | ICD-10-CM

## 2022-09-01 DIAGNOSIS — R26.81 UNSTEADINESS: ICD-10-CM

## 2022-09-01 PROCEDURE — 97165 OT EVAL LOW COMPLEX 30 MIN: CPT | Mod: GO | Performed by: OCCUPATIONAL THERAPIST

## 2022-09-01 PROCEDURE — 97112 NEUROMUSCULAR REEDUCATION: CPT | Mod: GO | Performed by: OCCUPATIONAL THERAPIST

## 2022-09-01 NOTE — PROGRESS NOTES
09/01/22 0800   Quick Adds   Type of Visit Initial Outpatient Occupational Therapy Evaluation   General Information   Start Of Care Date 09/01/22   Referring Physician Sania Olivia PA-C   Orders Evaluate and treat as indicated   Orders Date 08/19/22   Medical Diagnosis dizziness   Onset of Illness/Injury or Date of Surgery 08/19/22   Precautions/Limitations No known precautions/limitations   Surgical/Medical History Reviewed Yes   Additional Occupational Profile Info/Pertinent History of Current Problem In May of 2014 patient had sudden onset of true vertigo, vomiting and unsteadiness. She went to the ED and had imaging was negative. Symptoms resolved and then 2 months later, she had a second episode. She has had about 13 severe episodes in 5 years(up to October 2019). Since 2019, she reports that episodes are the same but she is now able to manage the episodes better. Patient has been seen at [facility], [place](neurologist and [facility]) and [facility]. Patient states that her symptoms are very disruptive and she is really hoping to find a cause and get some relief. Patient has active migraine's and is working with Dr. Palomo at the [facility] on all of her symptoms. 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   Pain   Patient currently in pain Yes   Pain location headache   Pain rating 6/10   Fall Risk Screen   Fall screen completed by OT   Have you fallen 2 or more times in the past year? No   Have you fallen and had an injury in the past year? No   Is patient a fall risk? No   Abuse Screen (yes response referral indicated)   Feels Unsafe at Home or Work/School no   Feels Threatened by Someone no   Does Anyone Try to Keep You From Having Contact with Others or Doing Things Outside Your Home? no   Physical Signs of Abuse Present no   Patient needs abuse support services and resources No   Planned Therapy Interventions   Planned Therapy Interventions Neuromuscular re-education    OT Goal 1   Goal Description Patient will  report decreased intensity to dizziness symptoms   Target Date 10/31/22    OT Goal 2   Goal Description Patient will be able to bend to brush her teeth without dizziness   Target Date 10/31/22   Clinical Impression   Criteria for Skilled Therapeutic Interventions Met Yes, treatment indicated   OT Diagnosis dizziness, unsteadiness, decreased ADL function   Clinical Decision Making (Complexity) Low complexity   Therapy Frequency every other week   Predicted Duration of Therapy Intervention (days/wks) 12 weeks   Risks and Benefits of Treatment have been explained. Yes   Patient, Family & other staff in agreement with plan of care Yes   Clinical Impression Comments Patient has had thorough evaluation via [facility] and specialists so I did not repeat oculomotor and nystagmus tests today. She is currently working with Dr. Palomo at the [facility]. Patient has symptoms that are multifactorial in nature. I explained that I can offer exercises if she is interested in trying these again.(they have not helped in the past). Patient is concerned about the neck and spine as a possible cause and I recommended she continue to discuss that with Dr. Palomo. Patient opted to trial oculomotor exercises today. See back in 2-3 weeks to see if any change in symptoms.   Education Assessment   Barriers To Learning No Barriers   Total Evaluation Time   OT Eval, Low Complexity Minutes ([id number]) 30

## 2022-09-11 ENCOUNTER — HEALTH MAINTENANCE LETTER (OUTPATIENT)
Age: 57
End: 2022-09-11

## 2022-09-23 ENCOUNTER — VIRTUAL VISIT (OUTPATIENT)
Dept: NEUROLOGY | Facility: CLINIC | Age: 57
End: 2022-09-23
Payer: COMMERCIAL

## 2022-09-23 DIAGNOSIS — G93.2 INTRACRANIAL PRESSURE INCREASED: Primary | ICD-10-CM

## 2022-09-23 DIAGNOSIS — M62.838 MUSCLE SPASM: ICD-10-CM

## 2022-09-23 DIAGNOSIS — I87.1 COMPRESSION OF VEIN: ICD-10-CM

## 2022-09-23 DIAGNOSIS — M43.6 CONTRACTURE OF STERNOCLEIDOMASTOID MUSCLE: ICD-10-CM

## 2022-09-23 DIAGNOSIS — G54.0 TOS (THORACIC OUTLET SYNDROME): ICD-10-CM

## 2022-09-23 PROCEDURE — 99215 OFFICE O/P EST HI 40 MIN: CPT | Mod: 95 | Performed by: PSYCHIATRY & NEUROLOGY

## 2022-09-23 RX ORDER — METHAZOLAMIDE 25 MG/1
TABLET ORAL
Qty: 42 TABLET | Refills: 3 | Status: SHIPPED | OUTPATIENT
Start: 2022-09-23 | End: 2023-03-20

## 2022-09-23 RX ORDER — DULOXETIN HYDROCHLORIDE 30 MG/1
CAPSULE, DELAYED RELEASE ORAL
COMMUNITY
Start: 2022-09-19

## 2022-09-23 RX ORDER — ESTRADIOL/NORETHINDRONE ACETATE TRANSDERMAL SYSTEM .05; .25 MG/D; MG/D
PATCH, EXTENDED RELEASE TRANSDERMAL
COMMUNITY
Start: 2022-08-26

## 2022-09-23 NOTE — LETTER
"9/23/2022       RE: Oral Singh  [address] [place] [address]     Dear Colleague,    Thank you for referring your patient, Oral Singh, to the [facility] [place] [facility]. Please see a copy of my visit note below.    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  She also has chronic daily pressure headaches and neck pain.  She has chronic bilateral upper extremity weakness. There is some chronic swelling in the hands but also in the feet more recently.  Paresthesias in the hand and the headache have been worse on the right side by history but on exam today paresthesias are worse in the left hand.  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      She continues to feel that the arms are more tired than the legs. Dizziness is worse with flexion. Tinnitus is worse with head extension. Headache is worse with extension. There is no throat pressure. No problems swallowing.      Ultrasound on 4-13-21 showed elevated right internal jugular vein velocities compared to the left but no areas of obstruction. CT venogram on 7-19-21 showed elongated styloid processes at 3.0/3.1cm bilaterally      She still experiences pressure on the top of the head at least once a week that can last 12-24 hours. It is not worse on one side. She is having the lightheadedness episodes throughout the day. She can trigger several a day triggered by fast head movements. They are lasting about 30-minutes to get back to baseline. This is worse with bending the head forward. She also notes a chronic cough since the beginning " of the year. She coughs throughout the day. She may wake up in the middle of the night with coughing. There have been no voice changes. No pressure in the throat. No pain when swallowing.      A trial of topiramate escalated to 100mg BID did not help, so she is down to 50mg BID. Symptoms did not get worse when she came down. She has not had physical therapy.     We discussed the 2 imaging studies and the relevance that they may have to her current symptoms.  I think it be worthwhile given there conservative nature to try a course of physical therapy addressing the thoracic outlet and sternocleidomastoid area.  We would also consider escalating her diagnostic testing with a referral to my colleagues in physical medicine and rehab for a muscle block.  She also notes that her lightheadedness is significantly worse with head flexion.  Given enough time past since her last CT scan she is agreeable to try to CT venogram in the head flexed position which I have ordered.  This may help us with choosing all the correct muscles to inject if she does end up being referred for a muscle block.     Plan:  1. Physical therapy for thoracic outlet as well sternocleidomastoid area  2. CT venogram in the neutral and head flexed positions.   3. Consider trial muscle block with PM&R.      Interim History 1-28-22:  CT venogram 10-9-21: Styloid process measures 2.9 cm on the right.  Styloid process measures 3.5 cm on the left. Mild right and moderate left upper internal jugular venous narrowing with flexion.     She has had 8 sessions of PT through December 2021. She would better on the day of the session but she would revert to her baseline sessions the next day. She didn't see any sustained improvement in the headache or dizziness but she does still do some neck stretches at home because the neck feels better.      She still has a chronic daily pressure headaches mostly in the front of the head but can be at the base of the skull. There  have been no spinning very episodes since our last visit. But she continues to experience the lightheadedness mostly in the upright position, sitting and standing but better laying down.      There is still arm weakness but not so much the tingling.   We discussed the plan going forward to escalate to trying muscle blocks and Botox. She is agreeable with this plan.      Interim History 5-27-22:  5-9-22: PROCEDURE NOTE: Anterior Scalene Muscle and Sternocleidomastoid Muscle (SCM) Injection Under Ultrasound Guidance.  Before the procedure, she reported a pain score of 6/10.   After the procedure, she reported a pain score of 6/10.      She was fine in the room, but once she got out into the aranda[place]way she became very faint which lasted until she sat down. It lasted for about 5-minutes. She was able to drive home. For the rest of the day she felt better than her baseline. The lightheadedness/disorientation/pressure decreased it from 7/10 to 3/10 after two hours and it was 4/10 at 4 hours. She was back at her baseline by the evening. The next morning was the same as other morning. There was no neck tenderness. She is having arm tingling in the right once in a while, not too bad.      Was seen at the [facility] for question of CSF leak. CSF cisternogram from 1-18-21 was reviewed which was negative for a leak. She was a diagnosis of possible vestibular neuritis, vestibular migraine, and PPPD. Was referred to Jasmin Aceves in ENT with whom she has an appointment on 12-1-22.      She is still on topiramate 50mg BID. She is done with physical therapy. She is not taking sumatriptan usually, only for super lightheadedness. Her symptoms are particularly bad with head flexion triggering faintness. Head extension is okay. Also, liying on the right side will trigger faintness. There is no globus sensation. No throat pressure.      Plan:  1. Request Botox chronic migraine (failed topiramate and amitriptyline).   2.  If insufficiently helpful, would request vascular surgery evaluation for potential left styloid resection.      Interim History 9-23-22:  9-19-22: Bilateral occipital nerve blocks, trigger point injections.  Botox was not approved by insurance.     She has been very dizzy this summer. She has a lot of pressure in the ears, especially the left ear. She went to an ENT and had an MRI yesterday. She was given a diagnosis of vestibular migraine. She has been recommended to have trigger point injections which she had 4 days ago. This has not helped. She has tried a gluten free diet.    She has had really bad headaches in August over her son's wedding. This lasted about 5 days.     She has had no days with no head pressure. There is pressure behind the eyes. She is continuing to feel very off balanced and lightheaded. The lightheadedness is better sitting. When she stands, the lightheadedness is worse. Head movement will make her more lightheaded. Moving the head to the right is worse than moving to the left. Looking down is worse than looking up. Looking down and to the right is the worst. She is better is lying down. It makes the head pressure better. She has not fainted.     The arms and hands both feel weak. The fingers can get tingling. There is no color change. There is no swelling. There is no problem with washing hair.     She is taking topiramate 50mg BID. Of note she had a cisternogram at [facility] on January 2021 that was negative and had a opening pressure of 13.5mg H20. The CSF was normal except for an elevated protein of 71.     Plan:   1. 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 Consider referral to [facility] for [employer]'s syndrome after vascular referral  3. Methazolamide titrated to 50mg BID. When at goal dose start tapering the topiramate 25mg by each week  4. Follow-up in November 18th at 4pm for a video visit    DATA:  I personally reviewed the following data.    Last brain  imagin21: CT venogram without and with intravenous contrast     Head CTV demonstrates no definite occlusion or thrombus within the  major dural and deep intracranial venous sinuses.  The major intracranial arteries are grossly patent without definite  aneurysm or stenosis.      No hydrocephalus. No enhancing lesions. No midline shift or  herniation. No extra-axial collection. In the neck no evident mass. 1  cm left thyroid lobe nodule.     The right styloid process measures 3.1 cm. The left styloid process  measures 3.3 cm. Both styloid processes abut internal jugular vein  without definite compression.                                                                      Impression:  1. Elongated left and borderline elongated right styloid processes  abut the internal jugular vein without definite compression.  2. No evidence of thrombus intracranially within the major  intracranial venous sinuses.     I have personally reviewed the examination and initial interpretation  and I agree with the findings.     VERONICA SERRANO MD     10-09-21 CT venogram of the head and neck with intravenous contrast    Findings:  Head CTV demonstrates no occlusion or thrombus of the major dural and  deep intracranial venous sinuses.     The internal jugular veins are patent bilaterally without thrombus.     No compression of the right internal jugular vein in the neutral  position. With flexion, there is mild narrowing of the upper right  internal jugular vein as it courses along the posterior belly of the  digastric muscle.     No compression of the left internal jugular vein in the neutral  position. With flexion, there is moderate narrowing of the upper left  internal jugular vein as it courses between the posterior belly of the  digastric muscle and upper styloid process. This is similar to the  extent of narrowing seen with extension on 2021.     Styloid process measures 2.9 cm on the right.     Styloid process measures  3.5 cm on the left.     Cervical spondylosis, most pronounced at C5-6 and C6-7.     9 mm left thyroid nodule, unchanged since 7/19/2021 and requiring no  further follow-up by imaging criteria.                                                                   Impression:  1. No compression of the internal jugular veins in the neutral  position.  2. Mild right and moderate left upper internal jugular venous  narrowing with flexion.  3. Mildly elongated left styloid process on the left. Normal length of  the right styloid process.  4. No thrombus of the major intracranial venous sinuses.     JOSUE CORDOBA MD     THORACIC INLET/OUTLET DUPLEX ULTRASOUND 4/13/2021     FINDINGS:  RIGHT:       REST:            INTERNAL JUGULAR VEIN: 25 cm/s, phasic, fully compressible            INNOMINATE VEIN: 44 cm/s, phasic            SUBCLAVIAN VEIN, medial: 60 cm/s, phasic            SUBCLAVIAN VEIN, mid: 30 cm/s, phasic, fully compressible            SUBCLAVIAN VEIN, lateral: 43 cm/s, phasic, fully  compressible            AXILLARY VEIN: 50 cm/s, phasic, fully compressible          MID SUBCLAVIAN VEIN, sitting erect:            0 degrees: 159 cm/s, phasic            90 degrees: 91 cm/s, phasic            135 degrees: 70 cm/s, phasic            180 degrees: 194 cm/s, phasic          INTERNAL JUGULAR VEIN, sitting erect:            Neutral: 148 cm/s, phasic            Right: 204 cm/s, phasic            Left: 243 cm/s, phasic            Extension: 90 cm/s, phasic            Flexion: 104 cm/s, phasic          PPGs:            Baseline: Normal            Arms 90: Normal            Arms 180: Normal            : Normal             head right: Normal             head left: Normal            Onset: Normal     LEFT:       REST:            INTERNAL JUGULAR VEIN: 45 cm/s, phasic, fully compressible            INNOMINATE VEIN: 57 cm/s, phasic            SUBCLAVIAN VEIN, medial: 44 cm/s, phasic            SUBCLAVIAN VEIN,  mid: 51 cm/s, phasic, fully compressible            SUBCLAVIAN VEIN, lateral: 58 cm/s, phasic, fully  compressible            AXILLARY VEIN: 53 cm/s, phasic, fully compressible          MID SUBCLAVIAN VEIN, sitting erect:            0 degrees: 74 cm/s, phasic            90 degrees: 110 cm/s, phasic            135 degrees: 109 cm/s, phasic            180 degrees: 93 cm/s, phasic          INTERNAL JUGULAR VEIN, sitting erect:            Neutral: 167 cm/s, phasic            Right: 140 cm/s, phasic            Left: 196 cm/s, phasic            Extension: 173 cm/s, phasic            Flexion: 78 cm/s, phasic         PPGs:            Baseline: Normal            Arms 90: Normal            Arms 180: Normal            : Normal             head right: Normal             head left: Normal            Onset: Normal                                                            IMPRESSION:      1. RIGHT:       A. No subclavian venous stenosis suggested at rest.       B. No subclavian venous stenosis suggested with maneuvers.       C. Elevated internal jugular vein velocities with the patient  upright. Etiology not demonstrated. No significant velocity changes to  suggest narrowing with maneuvers.       D. Amplitude is diminished in baseline position. No arterial  stenosis suggested with maneuvers.     2. LEFT:       A. No subclavian venous stenosis suggested at rest.       B. No subclavian venous stenosis suggested with maneuvers.       C. Elevated internal jugular vein velocities with the patient  upright. Etiology not demonstrated. No significant velocity changes to  suggest narrowing with maneuvers.       D. Amplitude is diminished in baseline position. No arterial  stenosis suggested with maneuvers.     LALY FONG MD    56-minutes were spent in evaluation and counseling as well as documentation on the date of service. After a review of the patient s situation, this visit was changed from an in-person visit  to a video visit to reduce the risk of COVID-19 exposure.        Sincerely,    Toshia CAMARILLO Cha, MD

## 2022-09-23 NOTE — Clinical Note
September 23, 2022       TO: Oral Snigh  [address] [place] [address]       DearMs.Samantha,    We are writing to inform you of your test results.    {[facility] results letter list:[id number]}    No results found from the In Basket message.    ***

## 2022-09-23 NOTE — PROGRESS NOTES
"The patient is being evaluated via a billable video visit.    How would you like to obtain your AVS? MyChart  If the video visit is dropped, the invitation should be resent by: Send to e-mail at: [email or web address]  Will anyone else be joining your video visit? No      Video-Visit Details  Type of service:  Video Visit  Video Start Time:3:30 PM  Video End Time:4:13 PM  Originating Location (pt. 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  She also has chronic daily pressure headaches and neck pain.  She has chronic bilateral upper extremity weakness. There is some chronic swelling in the hands but also in the feet more recently.  Paresthesias in the hand and the headache have been worse on the right side by history but on exam today paresthesias are worse in the left hand.  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      She continues to feel that the arms are more tired than the legs. Dizziness is worse with flexion. Tinnitus is worse with head extension. Headache is worse with extension. There is no throat pressure. No problems swallowing.      Ultrasound on 4-13-21 showed elevated right internal jugular vein velocities compared to the left but no areas of obstruction. CT venogram on 7-19-21 showed elongated styloid processes at 3.0/3.1cm bilaterally      She still experiences pressure on the top of the head at least once a week that can last 12-24 hours. It is not worse on one side. She is having the lightheadedness episodes throughout " the day. She can trigger several a day triggered by fast head movements. They are lasting about 30-minutes to get back to baseline. This is worse with bending the head forward. She also notes a chronic cough since the beginning of the year. She coughs throughout the day. She may wake up in the middle of the night with coughing. There have been no voice changes. No pressure in the throat. No pain when swallowing.      A trial of topiramate escalated to 100mg BID did not help, so she is down to 50mg BID. Symptoms did not get worse when she came down. She has not had physical therapy.     We discussed the 2 imaging studies and the relevance that they may have to her current symptoms.  I think it be worthwhile given there conservative nature to try a course of physical therapy addressing the thoracic outlet and sternocleidomastoid area.  We would also consider escalating her diagnostic testing with a referral to my colleagues in physical medicine and rehab for a muscle block.  She also notes that her lightheadedness is significantly worse with head flexion.  Given enough time past since her last CT scan she is agreeable to try to CT venogram in the head flexed position which I have ordered.  This may help us with choosing all the correct muscles to inject if she does end up being referred for a muscle block.     Plan:  1. Physical therapy for thoracic outlet as well sternocleidomastoid area  2. CT venogram in the neutral and head flexed positions.   3. Consider trial muscle block with PM&R.      Interim History 1-28-22:  CT venogram 10-9-21: Styloid process measures 2.9 cm on the right.  Styloid process measures 3.5 cm on the left. Mild right and moderate left upper internal jugular venous narrowing with flexion.     She has had 8 sessions of PT through December 2021. She would better on the day of the session but she would revert to her baseline sessions the next day. She didn't see any sustained improvement in the  headache or dizziness but she does still do some neck stretches at home because the neck feels better.      She still has a chronic daily pressure headaches mostly in the front of the head but can be at the base of the skull. There have been no spinning very episodes since our last visit. But she continues to experience the lightheadedness mostly in the upright position, sitting and standing but better laying down.      There is still arm weakness but not so much the tingling.   We discussed the plan going forward to escalate to trying muscle blocks and Botox. She is agreeable with this plan.      Interim History 5-27-22:  5-9-22: PROCEDURE NOTE: Anterior Scalene Muscle and Sternocleidomastoid Muscle (SCM) Injection Under Ultrasound Guidance.  Before the procedure, she reported a pain score of 6/10.   After the procedure, she reported a pain score of 6/10.      She was fine in the room, but once she got out into the [place]anda[place]way she became very faint which lasted until she sat down. It lasted for about 5-minutes. She was able to drive home. For the rest of the day she felt better than her baseline. The lightheadedness/disorientation/pressure decreased it from 7/10 to 3/10 after two hours and it was 4/10 at 4 hours. She was back at her baseline by the evening. The next morning was the same as other morning. There was no neck tenderness. She is having arm tingling in the right once in a while, not too bad.      Was seen at the [facility] for question of CSF leak. CSF cisternogram from 1-18-21 was reviewed which was negative for a leak. She was a diagnosis of possible vestibular neuritis, vestibular migraine, and PPPD. Was referred to Jasmin Aceves in ENT with whom she has an appointment on 12-1-22.      She is still on topiramate 50mg BID. She is done with physical therapy. She is not taking sumatriptan usually, only for super lightheadedness. Her symptoms are particularly bad with head flexion  triggering faintness. Head extension is okay. Also, liying on the right side will trigger faintness. There is no globus sensation. No throat pressure.      Plan:  1. Request Botox chronic migraine (failed topiramate and amitriptyline).   2. If insufficiently helpful, would request vascular surgery evaluation for potential left styloid resection.      Interim History 9-23-22:  9-19-22: Bilateral occipital nerve blocks, trigger point injections.  Botox was not approved by insurance.     She has been very dizzy this summer. She has a lot of pressure in the ears, especially the left ear. She went to an ENT and had an MRI yesterday. She was given a diagnosis of vestibular migraine. She has been recommended to have trigger point injections which she had 4 days ago. This has not helped. She has tried a gluten free diet.    She has had really bad headaches in August over her son's wedding. This lasted about 5 days.     She has had no days with no head pressure. There is pressure behind the eyes. She is continuing to feel very off balanced and lightheaded. The lightheadedness is better sitting. When she stands, the lightheadedness is worse. Head movement will make her more lightheaded. Moving the head to the right is worse than moving to the left. Looking down is worse than looking up. Looking down and to the right is the worst. She is better is lying down. It makes the head pressure better. She has not fainted.     The arms and hands both feel weak. The fingers can get tingling. There is no color change. There is no swelling. There is no problem with washing hair.     She is taking topiramate 50mg BID. Of note she had a cisternogram at [facility] on January 2021 that was negative and had a opening pressure of 13.5mg H20. The CSF was normal except for an elevated protein of 71.     Chronic daily migraines with intractable vestibular symptoms. Patient has extracranial venous compression, perhaps as a  of these symptoms.      Plan:   1. 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 Methazolamide titrated to 50mg BID. When at goal dose start tapering the topiramate 25mg by each week.  4. Approval for Botox for chronic migraine with PM&R after clarification that he has failed 2.5 years of amitriptyline and 2.5 years of topiramate)  5. Follow-up in  at 4pm for a video visit    DATA:  I personally reviewed the following data.    Last brain imagin21: CT venogram without and with intravenous contrast     Head CTV demonstrates no definite occlusion or thrombus within the  major dural and deep intracranial venous sinuses.  The major intracranial arteries are grossly patent without definite  aneurysm or stenosis.      No hydrocephalus. No enhancing lesions. No midline shift or  herniation. No extra-axial collection. In the neck no evident mass. 1  cm left thyroid lobe nodule.     The right styloid process measures 3.1 cm. The left styloid process  measures 3.3 cm. Both styloid processes abut internal jugular vein  without definite compression.                                                                      Impression:  1. Elongated left and borderline elongated right styloid processes  abut the internal jugular vein without definite compression.  2. No evidence of thrombus intracranially within the major  intracranial venous sinuses.     I have personally reviewed the examination and initial interpretation  and I agree with the findings.     VERONICA SERRANO MD     10-09-21 CT venogram of the head and neck with intravenous contrast    Findings:  Head CTV demonstrates no occlusion or thrombus of the major dural and  deep intracranial venous sinuses.     The internal jugular veins are patent bilaterally without thrombus.     No compression of the right internal jugular vein in the neutral  position. With flexion, there is mild  narrowing of the upper right  internal jugular vein as it courses along the posterior belly of the  digastric muscle.     No compression of the left internal jugular vein in the neutral  position. With flexion, there is moderate narrowing of the upper left  internal jugular vein as it courses between the posterior belly of the  digastric muscle and upper styloid process. This is similar to the  extent of narrowing seen with extension on 7/19/2021.     Styloid process measures 2.9 cm on the right.     Styloid process measures 3.5 cm on the left.     Cervical spondylosis, most pronounced at C5-6 and C6-7.     9 mm left thyroid nodule, unchanged since 7/19/2021 and requiring no  further follow-up by imaging criteria.                                                                   Impression:  1. No compression of the internal jugular veins in the neutral  position.  2. Mild right and moderate left upper internal jugular venous  narrowing with flexion.  3. Mildly elongated left styloid process on the left. Normal length of  the right styloid process.  4. No thrombus of the major intracranial venous sinuses.     JOSUE CORDOBA MD     THORACIC INLET/OUTLET DUPLEX ULTRASOUND 4/13/2021     FINDINGS:  RIGHT:       REST:            INTERNAL JUGULAR VEIN: 25 cm/s, phasic, fully compressible            INNOMINATE VEIN: 44 cm/s, phasic            SUBCLAVIAN VEIN, medial: 60 cm/s, phasic            SUBCLAVIAN VEIN, mid: 30 cm/s, phasic, fully compressible            SUBCLAVIAN VEIN, lateral: 43 cm/s, phasic, fully  compressible            AXILLARY VEIN: 50 cm/s, phasic, fully compressible          MID SUBCLAVIAN VEIN, sitting erect:            0 degrees: 159 cm/s, phasic            90 degrees: 91 cm/s, phasic            135 degrees: 70 cm/s, phasic            180 degrees: 194 cm/s, phasic          INTERNAL JUGULAR VEIN, sitting erect:            Neutral: 148 cm/s, phasic            Right: 204 cm/s, phasic            Left: 243  cm/s, phasic            Extension: 90 cm/s, phasic            Flexion: 104 cm/s, phasic          PPGs:            Baseline: Normal            Arms 90: Normal            Arms 180: Normal            : Normal             head right: Normal             head left: Normal            Onset: Normal     LEFT:       REST:            INTERNAL JUGULAR VEIN: 45 cm/s, phasic, fully compressible            INNOMINATE VEIN: 57 cm/s, phasic            SUBCLAVIAN VEIN, medial: 44 cm/s, phasic            SUBCLAVIAN VEIN, mid: 51 cm/s, phasic, fully compressible            SUBCLAVIAN VEIN, lateral: 58 cm/s, phasic, fully  compressible            AXILLARY VEIN: 53 cm/s, phasic, fully compressible          MID SUBCLAVIAN VEIN, sitting erect:            0 degrees: 74 cm/s, phasic            90 degrees: 110 cm/s, phasic            135 degrees: 109 cm/s, phasic            180 degrees: 93 cm/s, phasic          INTERNAL JUGULAR VEIN, sitting erect:            Neutral: 167 cm/s, phasic            Right: 140 cm/s, phasic            Left: 196 cm/s, phasic            Extension: 173 cm/s, phasic            Flexion: 78 cm/s, phasic         PPGs:            Baseline: Normal            Arms 90: Normal            Arms 180: Normal            : Normal             head right: Normal             head left: Normal            Onset: Normal                                                            IMPRESSION:      1. RIGHT:       A. No subclavian venous stenosis suggested at rest.       B. No subclavian venous stenosis suggested with maneuvers.       C. Elevated internal jugular vein velocities with the patient  upright. Etiology not demonstrated. No significant velocity changes to  suggest narrowing with maneuvers.       D. Amplitude is diminished in baseline position. No arterial  stenosis suggested with maneuvers.     2. LEFT:       A. No subclavian venous stenosis suggested at rest.       B. No subclavian  venous stenosis suggested with maneuvers.       C. Elevated internal jugular vein velocities with the patient  upright. Etiology not demonstrated. No significant velocity changes to  suggest narrowing with maneuvers.       D. Amplitude is diminished in baseline position. No arterial  stenosis suggested with maneuvers.     LALY FONG MD    56-minutes were spent in evaluation and counseling as well as documentation on the date of service. After a review of the patient s situation, this visit was changed from an in-person visit to a video visit to reduce the risk of COVID-19 exposure.

## 2022-09-23 NOTE — PROGRESS NOTES
RENUKA is a 57 year old who is being evaluated via a billable video visit.      How would you like to obtain your AVS? MyChart  If the video visit is dropped, the invitation should be resent by: Send to e-mail at: [email or web address]  Will anyone else be joining your video visit? No  {If patient encounters technical issues they should call [phone] :[id number]}      Video-Visit Details    Video Start Time: {video visit start/end time for provider to select:726931}    Type of service:  Video Visit    Video End Time:{video visit start/end time for provider to select:475768}    Originating Location (pt. Location): Home    Distant Location (provider location):  [facility] NEUROLOGY [facility]     Platform used for Video Visit: [employer]

## 2022-09-23 NOTE — LETTER
"9/23/2022       RE: Oral Singh  [address] [place] [address]     Dear Colleague,    Thank you for referring your patient, Oral Singh, to the [facility] [place] [facility]. Please see a copy of my visit note below.    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  She also has chronic daily pressure headaches and neck pain.  She has chronic bilateral upper extremity weakness. There is some chronic swelling in the hands but also in the feet more recently.  Paresthesias in the hand and the headache have been worse on the right side by history but on exam today paresthesias are worse in the left hand.  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      She continues to feel that the arms are more tired than the legs. Dizziness is worse with flexion. Tinnitus is worse with head extension. Headache is worse with extension. There is no throat pressure. No problems swallowing.      Ultrasound on 4-13-21 showed elevated right internal jugular vein velocities compared to the left but no areas of obstruction. CT venogram on 7-19-21 showed elongated styloid processes at 3.0/3.1cm bilaterally      She still experiences pressure on the top of the head at least once a week that can last 12-24 hours. It is not worse on one side. She is having the lightheadedness episodes throughout the day. She can trigger several a day triggered by fast head movements. They are lasting about 30-minutes to get back to baseline. This is worse with bending the head forward. She also notes a chronic cough since the beginning " of the year. She coughs throughout the day. She may wake up in the middle of the night with coughing. There have been no voice changes. No pressure in the throat. No pain when swallowing.      A trial of topiramate escalated to 100mg BID did not help, so she is down to 50mg BID. Symptoms did not get worse when she came down. She has not had physical therapy.     We discussed the 2 imaging studies and the relevance that they may have to her current symptoms.  I think it be worthwhile given there conservative nature to try a course of physical therapy addressing the thoracic outlet and sternocleidomastoid area.  We would also consider escalating her diagnostic testing with a referral to my colleagues in physical medicine and rehab for a muscle block.  She also notes that her lightheadedness is significantly worse with head flexion.  Given enough time past since her last CT scan she is agreeable to try to CT venogram in the head flexed position which I have ordered.  This may help us with choosing all the correct muscles to inject if she does end up being referred for a muscle block.     Plan:  1. Physical therapy for thoracic outlet as well sternocleidomastoid area  2. CT venogram in the neutral and head flexed positions.   3. Consider trial muscle block with PM&R.      Interim History 1-28-22:  CT venogram 10-9-21: Styloid process measures 2.9 cm on the right.  Styloid process measures 3.5 cm on the left. Mild right and moderate left upper internal jugular venous narrowing with flexion.     She has had 8 sessions of PT through December 2021. She would better on the day of the session but she would revert to her baseline sessions the next day. She didn't see any sustained improvement in the headache or dizziness but she does still do some neck stretches at home because the neck feels better.      She still has a chronic daily pressure headaches mostly in the front of the head but can be at the base of the skull. There  have been no spinning very episodes since our last visit. But she continues to experience the lightheadedness mostly in the upright position, sitting and standing but better laying down.      There is still arm weakness but not so much the tingling.   We discussed the plan going forward to escalate to trying muscle blocks and Botox. She is agreeable with this plan.      Interim History 5-27-22:  5-9-22: PROCEDURE NOTE: Anterior Scalene Muscle and Sternocleidomastoid Muscle (SCM) Injection Under Ultrasound Guidance.  Before the procedure, she reported a pain score of 6/10.   After the procedure, she reported a pain score of 6/10.      She was fine in the room, but once she got out into the aranda[place]way she became very faint which lasted until she sat down. It lasted for about 5-minutes. She was able to drive home. For the rest of the day she felt better than her baseline. The lightheadedness/disorientation/pressure decreased it from 7/10 to 3/10 after two hours and it was 4/10 at 4 hours. She was back at her baseline by the evening. The next morning was the same as other morning. There was no neck tenderness. She is having arm tingling in the right once in a while, not too bad.      Was seen at the [facility] for question of CSF leak. CSF cisternogram from 1-18-21 was reviewed which was negative for a leak. She was a diagnosis of possible vestibular neuritis, vestibular migraine, and PPPD. Was referred to Jasmin Aceves in ENT with whom she has an appointment on 12-1-22.      She is still on topiramate 50mg BID. She is done with physical therapy. She is not taking sumatriptan usually, only for super lightheadedness. Her symptoms are particularly bad with head flexion triggering faintness. Head extension is okay. Also, liying on the right side will trigger faintness. There is no globus sensation. No throat pressure.      Plan:  1. Request Botox chronic migraine (failed topiramate and amitriptyline).   2.  If insufficiently helpful, would request vascular surgery evaluation for potential left styloid resection.      Interim History 9-23-22:  9-19-22: Bilateral occipital nerve blocks, trigger point injections.  Botox was not approved by insurance.     She has been very dizzy this summer. She has a lot of pressure in the ears, especially the left ear. She went to an ENT and had an MRI yesterday. She was given a diagnosis of vestibular migraine. She has been recommended to have trigger point injections which she had 4 days ago. This has not helped. She has tried a gluten free diet.    She has had really bad headaches in August over her son's wedding. This lasted about 5 days.     She has had no days with no head pressure. There is pressure behind the eyes. She is continuing to feel very off balanced and lightheaded. The lightheadedness is better sitting. When she stands, the lightheadedness is worse. Head movement will make her more lightheaded. Moving the head to the right is worse than moving to the left. Looking down is worse than looking up. Looking down and to the right is the worst. She is better is lying down. It makes the head pressure better. She has not fainted.     The arms and hands both feel weak. The fingers can get tingling. There is no color change. There is no swelling. There is no problem with washing hair.     She is taking topiramate 50mg BID. Of note she had a cisternogram at [facility] on January 2021 that was negative and had a opening pressure of 13.5mg H20. The CSF was normal except for an elevated protein of 71.     Plan:   1. 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 Methazolamide titrated to 50mg BID. When at goal dose start tapering the topiramate 25mg by each week  4. Follow-up in November 18th at 4pm for a video visit    DATA:  I personally reviewed the following data.    Last brain  imagin21: CT venogram without and with intravenous contrast     Head CTV demonstrates no definite occlusion or thrombus within the  major dural and deep intracranial venous sinuses.  The major intracranial arteries are grossly patent without definite  aneurysm or stenosis.      No hydrocephalus. No enhancing lesions. No midline shift or  herniation. No extra-axial collection. In the neck no evident mass. 1  cm left thyroid lobe nodule.     The right styloid process measures 3.1 cm. The left styloid process  measures 3.3 cm. Both styloid processes abut internal jugular vein  without definite compression.                                                                      Impression:  1. Elongated left and borderline elongated right styloid processes  abut the internal jugular vein without definite compression.  2. No evidence of thrombus intracranially within the major  intracranial venous sinuses.     I have personally reviewed the examination and initial interpretation  and I agree with the findings.     VERONICA SERRANO MD     10-09-21 CT venogram of the head and neck with intravenous contrast    Findings:  Head CTV demonstrates no occlusion or thrombus of the major dural and  deep intracranial venous sinuses.     The internal jugular veins are patent bilaterally without thrombus.     No compression of the right internal jugular vein in the neutral  position. With flexion, there is mild narrowing of the upper right  internal jugular vein as it courses along the posterior belly of the  digastric muscle.     No compression of the left internal jugular vein in the neutral  position. With flexion, there is moderate narrowing of the upper left  internal jugular vein as it courses between the posterior belly of the  digastric muscle and upper styloid process. This is similar to the  extent of narrowing seen with extension on 2021.     Styloid process measures 2.9 cm on the right.     Styloid process measures  3.5 cm on the left.     Cervical spondylosis, most pronounced at C5-6 and C6-7.     9 mm left thyroid nodule, unchanged since 7/19/2021 and requiring no  further follow-up by imaging criteria.                                                                   Impression:  1. No compression of the internal jugular veins in the neutral  position.  2. Mild right and moderate left upper internal jugular venous  narrowing with flexion.  3. Mildly elongated left styloid process on the left. Normal length of  the right styloid process.  4. No thrombus of the major intracranial venous sinuses.     JOSUE CORDOBA MD     THORACIC INLET/OUTLET DUPLEX ULTRASOUND 4/13/2021     FINDINGS:  RIGHT:       REST:            INTERNAL JUGULAR VEIN: 25 cm/s, phasic, fully compressible            INNOMINATE VEIN: 44 cm/s, phasic            SUBCLAVIAN VEIN, medial: 60 cm/s, phasic            SUBCLAVIAN VEIN, mid: 30 cm/s, phasic, fully compressible            SUBCLAVIAN VEIN, lateral: 43 cm/s, phasic, fully  compressible            AXILLARY VEIN: 50 cm/s, phasic, fully compressible          MID SUBCLAVIAN VEIN, sitting erect:            0 degrees: 159 cm/s, phasic            90 degrees: 91 cm/s, phasic            135 degrees: 70 cm/s, phasic            180 degrees: 194 cm/s, phasic          INTERNAL JUGULAR VEIN, sitting erect:            Neutral: 148 cm/s, phasic            Right: 204 cm/s, phasic            Left: 243 cm/s, phasic            Extension: 90 cm/s, phasic            Flexion: 104 cm/s, phasic          PPGs:            Baseline: Normal            Arms 90: Normal            Arms 180: Normal            : Normal             head right: Normal             head left: Normal            Onset: Normal     LEFT:       REST:            INTERNAL JUGULAR VEIN: 45 cm/s, phasic, fully compressible            INNOMINATE VEIN: 57 cm/s, phasic            SUBCLAVIAN VEIN, medial: 44 cm/s, phasic            SUBCLAVIAN VEIN,  mid: 51 cm/s, phasic, fully compressible            SUBCLAVIAN VEIN, lateral: 58 cm/s, phasic, fully  compressible            AXILLARY VEIN: 53 cm/s, phasic, fully compressible          MID SUBCLAVIAN VEIN, sitting erect:            0 degrees: 74 cm/s, phasic            90 degrees: 110 cm/s, phasic            135 degrees: 109 cm/s, phasic            180 degrees: 93 cm/s, phasic          INTERNAL JUGULAR VEIN, sitting erect:            Neutral: 167 cm/s, phasic            Right: 140 cm/s, phasic            Left: 196 cm/s, phasic            Extension: 173 cm/s, phasic            Flexion: 78 cm/s, phasic         PPGs:            Baseline: Normal            Arms 90: Normal            Arms 180: Normal            : Normal             head right: Normal             head left: Normal            Onset: Normal                                                            IMPRESSION:      1. RIGHT:       A. No subclavian venous stenosis suggested at rest.       B. No subclavian venous stenosis suggested with maneuvers.       C. Elevated internal jugular vein velocities with the patient  upright. Etiology not demonstrated. No significant velocity changes to  suggest narrowing with maneuvers.       D. Amplitude is diminished in baseline position. No arterial  stenosis suggested with maneuvers.     2. LEFT:       A. No subclavian venous stenosis suggested at rest.       B. No subclavian venous stenosis suggested with maneuvers.       C. Elevated internal jugular vein velocities with the patient  upright. Etiology not demonstrated. No significant velocity changes to  suggest narrowing with maneuvers.       D. Amplitude is diminished in baseline position. No arterial  stenosis suggested with maneuvers.     LALY FONG MD    56-minutes were spent in evaluation and counseling as well as documentation on the date of service. After a review of the patient s situation, this visit was changed from an in-person visit  to a video visit to reduce the risk of COVID-19 exposure.        Sincerely,    Toshia CAMARILLO Cha, MD

## 2022-09-26 ENCOUNTER — TELEPHONE (OUTPATIENT)
Dept: NEUROLOGY | Facility: CLINIC | Age: 57
End: 2022-09-26

## 2022-09-26 NOTE — TELEPHONE ENCOUNTER
----- Message from Toshia MILAN Cha, MD sent at 9/23/2022  7:30 PM CDT -----  Regarding: Referral to Zev Guadarrama and follow-up  J Carlos Luque/Lisa,  This would better handled by someone who has done this before since it is not urgent.  Can you please send the referral to Zev Guadarrama at [facility] and have images from 2021 and 2022 sent to him. These are MRI/MRV, CTV, Ultrasound.  Also, could you add her add her on 11-18-22 at 4pm as an add on?  Thanks very much.  ~Toshia

## 2022-10-06 DIAGNOSIS — G43.719 INTRACTABLE CHRONIC MIGRAINE WITHOUT AURA AND WITHOUT STATUS MIGRAINOSUS: Primary | ICD-10-CM

## 2022-10-06 DIAGNOSIS — M62.838 MUSCLE SPASM: ICD-10-CM

## 2022-10-06 RX ORDER — ONABOTULINUMTOXINA 200 [USP'U]/1
200 INJECTION, POWDER, LYOPHILIZED, FOR SOLUTION INTRADERMAL; INTRAMUSCULAR ONCE
Qty: 1 EACH | Refills: 0
Start: 2022-10-06 | End: 2022-10-06

## 2022-10-07 DIAGNOSIS — M62.838 MUSCLE SPASM: ICD-10-CM

## 2022-10-07 DIAGNOSIS — M43.6 CONTRACTURE OF STERNOCLEIDOMASTOID MUSCLE: ICD-10-CM

## 2022-10-07 DIAGNOSIS — G43.719 INTRACTABLE CHRONIC MIGRAINE WITHOUT AURA AND WITHOUT STATUS MIGRAINOSUS: Primary | ICD-10-CM

## 2022-11-08 ENCOUNTER — TELEPHONE (OUTPATIENT)
Dept: NEUROLOGY | Facility: CLINIC | Age: 57
End: 2022-11-08

## 2022-11-18 ENCOUNTER — VIRTUAL VISIT (OUTPATIENT)
Dept: NEUROLOGY | Facility: CLINIC | Age: 57
End: 2022-11-18
Payer: COMMERCIAL

## 2022-11-18 DIAGNOSIS — G43.719 INTRACTABLE CHRONIC MIGRAINE WITHOUT AURA AND WITHOUT STATUS MIGRAINOSUS: ICD-10-CM

## 2022-11-18 DIAGNOSIS — I87.1 COMPRESSION OF VEIN: ICD-10-CM

## 2022-11-18 DIAGNOSIS — G93.2 INTRACRANIAL PRESSURE INCREASED: Primary | ICD-10-CM

## 2022-11-18 DIAGNOSIS — M24.20 EAGLE'S SYNDROME: ICD-10-CM

## 2022-11-18 DIAGNOSIS — M43.6 CONTRACTURE OF STERNOCLEIDOMASTOID MUSCLE: ICD-10-CM

## 2022-11-18 DIAGNOSIS — M62.838 MUSCLE SPASM: ICD-10-CM

## 2022-11-18 PROCEDURE — 99213 OFFICE O/P EST LOW 20 MIN: CPT | Mod: 95 | Performed by: PSYCHIATRY & NEUROLOGY

## 2022-11-18 RX ORDER — ESTRADIOL 10 UG/1
10 INSERT VAGINAL
COMMUNITY
Start: 2022-11-07

## 2022-11-18 RX ORDER — UBIDECARENONE 100 MG
CAPSULE ORAL
COMMUNITY
End: 2023-10-19

## 2022-11-18 RX ORDER — MAGNESIUM GLYCINATE 100 MG
CAPSULE ORAL
COMMUNITY
End: 2024-04-26

## 2022-11-18 RX ORDER — METHAZOLAMIDE 50 MG/1
50 TABLET ORAL 2 TIMES DAILY
Qty: 62 TABLET | Refills: 3 | Status: SHIPPED | OUTPATIENT
Start: 2022-11-18 | End: 2023-03-20

## 2022-11-18 NOTE — PROGRESS NOTES
"The patient is being evaluated via a billable video visit.    How would you like to obtain your AVS? MyChart  If the video visit is dropped, the invitation should be resent by: Send to e-mail at: [email or web address]  Will anyone else be joining your video visit? No      Video-Visit Details  Type of service:  Video Visit  Video Start Time:4:25 PM  Video End Time:4:42 PM  Originating Location (pt. 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  She also has chronic daily pressure headaches and neck pain.  She has chronic bilateral upper extremity weakness. There is some chronic swelling in the hands but also in the feet more recently.  Paresthesias in the hand and the headache have been worse on the right side by history but on exam today paresthesias are worse in the left hand.  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      She continues to feel that the arms are more tired than the legs. Dizziness is worse with flexion. Tinnitus is worse with head extension. Headache is worse with extension. There is no throat pressure. No problems swallowing.      Ultrasound on 4-13-21 showed elevated right internal jugular vein velocities compared to the left but no areas of obstruction. CT venogram on 7-19-21 showed elongated styloid processes at 3.0/3.1cm bilaterally      She still experiences pressure on the top of the head at least once a week that can last 12-24 hours. It is not worse on one side. She is having the lightheadedness episodes throughout " the day. She can trigger several a day triggered by fast head movements. They are lasting about 30-minutes to get back to baseline. This is worse with bending the head forward. She also notes a chronic cough since the beginning of the year. She coughs throughout the day. She may wake up in the middle of the night with coughing. There have been no voice changes. No pressure in the throat. No pain when swallowing.      A trial of topiramate escalated to 100mg BID did not help, so she is down to 50mg BID. Symptoms did not get worse when she came down. She has not had physical therapy.     We discussed the 2 imaging studies and the relevance that they may have to her current symptoms.  I think it be worthwhile given there conservative nature to try a course of physical therapy addressing the thoracic outlet and sternocleidomastoid area.  We would also consider escalating her diagnostic testing with a referral to my colleagues in physical medicine and rehab for a muscle block.  She also notes that her lightheadedness is significantly worse with head flexion.  Given enough time past since her last CT scan she is agreeable to try to CT venogram in the head flexed position which I have ordered.  This may help us with choosing all the correct muscles to inject if she does end up being referred for a muscle block.     Plan:  1. Physical therapy for thoracic outlet as well sternocleidomastoid area  2. CT venogram in the neutral and head flexed positions.   3. Consider trial muscle block with PM&R.      Interim History 1-28-22:  CT venogram 10-9-21: Styloid process measures 2.9 cm on the right.  Styloid process measures 3.5 cm on the left. Mild right and moderate left upper internal jugular venous narrowing with flexion.     She has had 8 sessions of PT through December 2021. She would better on the day of the session but she would revert to her baseline sessions the next day. She didn't see any sustained improvement in the  headache or dizziness but she does still do some neck stretches at home because the neck feels better.      She still has a chronic daily pressure headaches mostly in the front of the head but can be at the base of the skull. There have been no spinning very episodes since our last visit. But she continues to experience the lightheadedness mostly in the upright position, sitting and standing but better laying down.      There is still arm weakness but not so much the tingling.   We discussed the plan going forward to escalate to trying muscle blocks and Botox. She is agreeable with this plan.      Interim History 5-27-22:  5-9-22: PROCEDURE NOTE: Anterior Scalene Muscle and Sternocleidomastoid Muscle (SCM) Injection Under Ultrasound Guidance.  Before the procedure, she reported a pain score of 6/10.   After the procedure, she reported a pain score of 6/10.      She was fine in the room, but once she got out into the aranda-way she became very faint which lasted until she sat down. It lasted for about 5-minutes. She was able to drive home. For the rest of the day she felt better than her baseline. The lightheadedness/disorientation/pressure decreased it from 7/10 to 3/10 after two hours and it was 4/10 at 4 hours. She was back at her baseline by the evening. The next morning was the same as other morning. There was no neck tenderness. She is having arm tingling in the right once in a while, not too bad.      Was seen at the [facility] for question of CSF leak. CSF cisternogram from 1-18-21 was reviewed which was negative for a leak. She was a diagnosis of possible vestibular neuritis, vestibular migraine, and PPPD. Was referred to Jasmin Aceves in ENT with whom she has an appointment on 12-1-22.      She is still on topiramate 50mg BID. She is done with physical therapy. She is not taking sumatriptan usually, only for super lightheadedness. Her symptoms are particularly bad with head flexion  triggering faintness. Head extension is okay. Also, liying on the right side will trigger faintness. There is no globus sensation. No throat pressure.      Plan:  1. Request Botox chronic migraine (failed topiramate and amitriptyline).   2. If insufficiently helpful, would request vascular surgery evaluation for potential left styloid resection.      Interim History 9-23-22:  9-19-22: Bilateral occipital nerve blocks, trigger point injections.  Botox was not approved by insurance.      She has been very dizzy this summer. She has a lot of pressure in the ears, especially the left ear. She went to an ENT and had an MRI yesterday. She was given a diagnosis of vestibular migraine. She has been recommended to have trigger point injections which she had 4 days ago. This has not helped. She has tried a gluten free diet.     She has had really bad headaches in August over her son's wedding. This lasted about 5 days.      She has had no days with no head pressure. There is pressure behind the eyes. She is continuing to feel very off balanced and lightheaded. The lightheadedness is better sitting. When she stands, the lightheadedness is worse. Head movement will make her more lightheaded. Moving the head to the right is worse than moving to the left. Looking down is worse than looking up. Looking down and to the right is the worst. She is better is lying down. It makes the head pressure better. She has not fainted.      The arms and hands both feel weak. The fingers can get tingling. There is no color change. There is no swelling. There is no problem with washing hair.      She is taking topiramate 50mg BID. Of note she had a cisternogram at [place] on January 2021 that was negative and had a opening pressure of 13.5mg H20. The CSF was normal except for an elevated protein of 71.      Chronic daily migraines with intractable vestibular symptoms. Patient has extracranial venous compression, perhaps as a  of these symptoms.       Plan:   1. 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 Consider referral to [place] for [place]'s syndrome after vascular referral  3. Methazolamide titrated to 50mg BID. When at goal dose start tapering the topiramate 25mg by each week.  4. Approval for Botox for chronic migraine with PM&R after clarification that he has failed 2.5 years of amitriptyline and 2.5 years of topiramate)  5. Follow-up in November 18th at 4pm for a video visit    INTERIM HISTORY 11/18/2022:  Botox had been denied by her insurance company despite a [id number] review and several requests. She had already failed over 2-years of topiramate and amitriptyline.     She was able to see Dr. Cristian Guadarrama on 11-9-22 for consideration of whether jugular vein compression could be contributing to her symptoms. She is scheduled for a venogram on 12-9-22. The appointment with [facility] on 12-1-22 has been cancelled.     Her headache and vertigo remained severe with head pressure every day and extreme sensitivity to head movement. There is continued bilateral ear pressure and tinnitus in the right ear because left ear is deaf. The tinnitus can change with bending over or with head movements.     Methazolamide titrated to 50mg BID went fine. She has been able to taper off the topiramate. She does feel more cognitively more foggy even without an increase in head pressure. There is neck tightness and pain symmetric. There is sometimes pain behind the right ear, where the styloid is.     She has no other major events. No falls. No numbness, tingling, or weakness in the hand.     PLAN:  1. Methazolamide 50mg BID tablets  2. Aware venogram for diagnosis of possible SCM syndrome      Current Outpatient Medications:      co-enzyme Q-10 100 MG CAPS capsule, , Disp: , Rfl:      COMBIPATCH 0.05-0.25 MG/DAY bi-weekly patch, , Disp: , Rfl:      DULoxetine (CYMBALTA) 30 MG capsule, , Disp: , Rfl:      estradiol (VAGIFEM) 10 MCG TABS vaginal  tablet, Place 10 mcg vaginally, Disp: , Rfl:      fish oil-omega-3 fatty acids 1000 MG capsule, Take 2 g by mouth, Disp: , Rfl:      losartan (COZAAR) 25 MG tablet, Take 25 mg by mouth, Disp: , Rfl:      Magnesium Glycinate 100 MG CAPS, , Disp: , Rfl:      methazolamide (NEPTAZANE) 25 MG tablet, Take 25mg once a day for 3 days. Increase by 25mg every 3 days until goal dose 50mg twice a day., Disp: 42 tablet, Rfl: 3     methazolamide (NEPTAZANE) 50 MG tablet, Take 1 tablet (50 mg) by mouth 2 times daily, Disp: 62 tablet, Rfl: 3     SUMAtriptan (IMITREX) 50 MG tablet, as needed, Disp: , Rfl:      traZODone (DESYREL) 50 MG tablet, At Bedtime, Disp: , Rfl:      Turmeric 500 MG CAPS, , Disp: , Rfl:      vitamin B-Complex, , Disp: , Rfl:     Current Facility-Administered Medications:      botulinum toxin type A (BOTOX) 100 units injection 100 Units, 100 Units, Intramuscular, Q90 Days, Lauren Mayo MD     Botulinum Toxin Type A (BOTOX) 200 units injection 200 Units, 200 Units, Intramuscular, Once, Toshia Palomo MD    24-minutes were spent in evaluation and counseling as well as documentation on the date of service. After a review of the patient s situation, this visit was changed from an in-person visit to a video visit to reduce the risk of COVID-19 exposure.

## 2022-11-18 NOTE — LETTER
"11/18/2022       RE: Oral Singh  [address] [place] [address]     Dear Colleague,    Thank you for referring your patient, Oral Singh, to the [facility]OLOG[facility] [place] [facility]. Please see a copy of my visit note below.    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  She also has chronic daily pressure headaches and neck pain.  She has chronic bilateral upper extremity weakness. There is some chronic swelling in the hands but also in the feet more recently.  Paresthesias in the hand and the headache have been worse on the right side by history but on exam today paresthesias are worse in the left hand.  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      She continues to feel that the arms are more tired than the legs. Dizziness is worse with flexion. Tinnitus is worse with head extension. Headache is worse with extension. There is no throat pressure. No problems swallowing.      Ultrasound on 4-13-21 showed elevated right internal jugular vein velocities compared to the left but no areas of obstruction. CT venogram on 7-19-21 showed elongated styloid processes at 3.0/3.1cm bilaterally      She still experiences pressure on the top of the head at least once a week that can last 12-24 hours. It is not worse on one side. She is having the lightheadedness episodes throughout the day. She can trigger several a day triggered by fast head movements. They are lasting about 30-minutes to get back to baseline. This is worse with bending the head forward. She also notes a chronic cough since the " beginning of the year. She coughs throughout the day. She may wake up in the middle of the night with coughing. There have been no voice changes. No pressure in the throat. No pain when swallowing.      A trial of topiramate escalated to 100mg BID did not help, so she is down to 50mg BID. Symptoms did not get worse when she came down. She has not had physical therapy.     We discussed the 2 imaging studies and the relevance that they may have to her current symptoms.  I think it be worthwhile given there conservative nature to try a course of physical therapy addressing the thoracic outlet and sternocleidomastoid area.  We would also consider escalating her diagnostic testing with a referral to my colleagues in physical medicine and rehab for a muscle block.  She also notes that her lightheadedness is significantly worse with head flexion.  Given enough time past since her last CT scan she is agreeable to try to CT venogram in the head flexed position which I have ordered.  This may help us with choosing all the correct muscles to inject if she does end up being referred for a muscle block.     Plan:  1. Physical therapy for thoracic outlet as well sternocleidomastoid area  2. CT venogram in the neutral and head flexed positions.   3. Consider trial muscle block with PM&R.      Interim History 1-28-22:  CT venogram 10-9-21: Styloid process measures 2.9 cm on the right.  Styloid process measures 3.5 cm on the left. Mild right and moderate left upper internal jugular venous narrowing with flexion.     She has had 8 sessions of PT through December 2021. She would better on the day of the session but she would revert to her baseline sessions the next day. She didn't see any sustained improvement in the headache or dizziness but she does still do some neck stretches at home because the neck feels better.      She still has a chronic daily pressure headaches mostly in the front of the head but can be at the base of the  skull. There have been no spinning very episodes since our last visit. But she continues to experience the lightheadedness mostly in the upright position, sitting and standing but better laying down.      There is still arm weakness but not so much the tingling.   We discussed the plan going forward to escalate to trying muscle blocks and Botox. She is agreeable with this plan.      Interim History 5-27-22:  5-9-22: PROCEDURE NOTE: Anterior Scalene Muscle and Sternocleidomastoid Muscle (SCM) Injection Under Ultrasound Guidance.  Before the procedure, she reported a pain score of 6/10.   After the procedure, she reported a pain score of 6/10.      She was fine in the room, but once she got out into the aranda[place]way she became very faint which lasted until she sat down. It lasted for about 5-minutes. She was able to drive home. For the rest of the day she felt better than her baseline. The lightheadedness/disorientation/pressure decreased it from 7/10 to 3/10 after two hours and it was 4/10 at 4 hours. She was back at her baseline by the evening. The next morning was the same as other morning. There was no neck tenderness. She is having arm tingling in the right once in a while, not too bad.      Was seen at the [facility] for question of CSF leak. CSF cisternogram from 1-18-21 was reviewed which was negative for a leak. She was a diagnosis of possible vestibular neuritis, vestibular migraine, and PPPD. Was referred to Jasmin Aceves in ENT with whom she has an appointment on 12-1-22.      She is still on topiramate 50mg BID. She is done with physical therapy. She is not taking sumatriptan usually, only for super lightheadedness. Her symptoms are particularly bad with head flexion triggering faintness. Head extension is okay. Also, liying on the right side will trigger faintness. There is no globus sensation. No throat pressure.      Plan:  1. Request Botox chronic migraine (failed topiramate and  amitriptyline).   2. If insufficiently helpful, would request vascular surgery evaluation for potential left styloid resection.      Interim History 9-23-22:  9-19-22: Bilateral occipital nerve blocks, trigger point injections.  Botox was not approved by insurance.      She has been very dizzy this summer. She has a lot of pressure in the ears, especially the left ear. She went to an ENT and had an MRI yesterday. She was given a diagnosis of vestibular migraine. She has been recommended to have trigger point injections which she had 4 days ago. This has not helped. She has tried a gluten free diet.     She has had really bad headaches in August over her son's wedding. This lasted about 5 days.      She has had no days with no head pressure. There is pressure behind the eyes. She is continuing to feel very off balanced and lightheaded. The lightheadedness is better sitting. When she stands, the lightheadedness is worse. Head movement will make her more lightheaded. Moving the head to the right is worse than moving to the left. Looking down is worse than looking up. Looking down and to the right is the worst. She is better is lying down. It makes the head pressure better. She has not fainted.      The arms and hands both feel weak. The fingers can get tingling. There is no color change. There is no swelling. There is no problem with washing hair.      She is taking topiramate 50mg BID. Of note she had a cisternogram at [place] on January 2021 that was negative and had a opening pressure of 13.5mg H20. The CSF was normal except for an elevated protein of 71.      Chronic daily migraines with intractable vestibular symptoms. Patient has extracranial venous compression, perhaps as a  of these symptoms.      Plan:   1. 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 Consider referral to [place] for [place]'s syndrome after vascular referral  3. Methazolamide titrated to 50mg BID. When at goal dose start  tapering the topiramate 25mg by each week.  4. Approval for Botox for chronic migraine with PM&R after clarification that he has failed 2.5 years of amitriptyline and 2.5 years of topiramate)  5. Follow-up in November 18th at 4pm for a video visit    INTERIM HISTORY 11/18/2022:  Botox had been denied by her insurance company despite a [id number] review and several requests. She had already failed over 2-years of topiramate and amitriptyline.     She was able to see Dr. Cristian Guadarrama on 11-9-22 for consideration of whether jugular vein compression could be contributing to her symptoms. She is scheduled for a venogram on 12-9-22. The appointment with [facility] on 12-1-22 has been cancelled.     Her headache and vertigo remained severe with head pressure every day and extreme sensitivity to head movement. There is continued bilateral ear pressure and tinnitus in the right ear because left ear is deaf. The tinnitus can change with bending over or with head movements.     Methazolamide titrated to 50mg BID went fine. She has been able to taper off the topiramate. She does feel more cognitively more foggy even without an increase in head pressure. There is neck tightness and pain symmetric. There is sometimes pain behind the right ear, where the styloid is.     She has no other major events. No falls. No numbness, tingling, or weakness in the hand.     PLAN:  1. Methazolamide 50mg BID tablets  2. Aware venogram for diagnosis of possible SCM syndrome      Current Outpatient Medications:      co-enzyme Q-10 100 MG CAPS capsule, , Disp: , Rfl:      COMBIPATCH 0.05-0.25 MG/DAY bi-weekly patch, , Disp: , Rfl:      DULoxetine (CYMBALTA) 30 MG capsule, , Disp: , Rfl:      estradiol (VAGIFEM) 10 MCG TABS vaginal tablet, Place 10 mcg vaginally, Disp: , Rfl:      fish oil-omega-3 fatty acids 1000 MG capsule, Take 2 g by mouth, Disp: , Rfl:      losartan (COZAAR) 25 MG tablet, Take 25 mg by mouth, Disp: , Rfl:      Magnesium  Glycinate 100 MG CAPS, , Disp: , Rfl:      methazolamide (NEPTAZANE) 25 MG tablet, Take 25mg once a day for 3 days. Increase by 25mg every 3 days until goal dose 50mg twice a day., Disp: 42 tablet, Rfl: 3     methazolamide (NEPTAZANE) 50 MG tablet, Take 1 tablet (50 mg) by mouth 2 times daily, Disp: 62 tablet, Rfl: 3     SUMAtriptan (IMITREX) 50 MG tablet, as needed, Disp: , Rfl:      traZODone (DESYREL) 50 MG tablet, At Bedtime, Disp: , Rfl:      Turmeric 500 MG CAPS, , Disp: , Rfl:      vitamin B-Complex, , Disp: , Rfl:     Current Facility-Administered Medications:      botulinum toxin type A (BOTOX) 100 units injection 100 Units, 100 Units, Intramuscular, Q90 Days, Lauren Mayo MD     Botulinum Toxin Type A (BOTOX) 200 units injection 200 Units, 200 Units, Intramuscular, Once, Toshia Palomo MD    24-minutes were spent in evaluation and counseling as well as documentation on the date of service. After a review of the patient s situation, this visit was changed from an in-person visit to a video visit to reduce the risk of COVID-19 exposure.        Sincerely,    Toshia CAMARILLO Cha, MD

## 2022-11-18 NOTE — LETTER
"11/18/2022       RE: Oral Singh  [address] [place] [address]     Dear Colleague,    Thank you for referring your patient, Oral Singh, to the [facility] [place] [facility]. Please see a copy of my visit note below.    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  She also has chronic daily pressure headaches and neck pain.  She has chronic bilateral upper extremity weakness. There is some chronic swelling in the hands but also in the feet more recently.  Paresthesias in the hand and the headache have been worse on the right side by history but on exam today paresthesias are worse in the left hand.  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      She continues to feel that the arms are more tired than the legs. Dizziness is worse with flexion. Tinnitus is worse with head extension. Headache is worse with extension. There is no throat pressure. No problems swallowing.      Ultrasound on 4-13-21 showed elevated right internal jugular vein velocities compared to the left but no areas of obstruction. CT venogram on 7-19-21 showed elongated styloid processes at 3.0/3.1cm bilaterally      She still experiences pressure on the top of the head at least once a week that can last 12-24 hours. It is not worse on one side. She is having the lightheadedness episodes throughout the day. She can trigger several a day triggered by fast head movements. They are lasting about 30-minutes to get back to baseline. This is worse with bending the head forward. She also notes a chronic cough since the " beginning of the year. She coughs throughout the day. She may wake up in the middle of the night with coughing. There have been no voice changes. No pressure in the throat. No pain when swallowing.      A trial of topiramate escalated to 100mg BID did not help, so she is down to 50mg BID. Symptoms did not get worse when she came down. She has not had physical therapy.     We discussed the 2 imaging studies and the relevance that they may have to her current symptoms.  I think it be worthwhile given there conservative nature to try a course of physical therapy addressing the thoracic outlet and sternocleidomastoid area.  We would also consider escalating her diagnostic testing with a referral to my colleagues in physical medicine and rehab for a muscle block.  She also notes that her lightheadedness is significantly worse with head flexion.  Given enough time past since her last CT scan she is agreeable to try to CT venogram in the head flexed position which I have ordered.  This may help us with choosing all the correct muscles to inject if she does end up being referred for a muscle block.     Plan:  1. Physical therapy for thoracic outlet as well sternocleidomastoid area  2. CT venogram in the neutral and head flexed positions.   3. Consider trial muscle block with PM&R.      Interim History 1-28-22:  CT venogram 10-9-21: Styloid process measures 2.9 cm on the right.  Styloid process measures 3.5 cm on the left. Mild right and moderate left upper internal jugular venous narrowing with flexion.     She has had 8 sessions of PT through December 2021. She would better on the day of the session but she would revert to her baseline sessions the next day. She didn't see any sustained improvement in the headache or dizziness but she does still do some neck stretches at home because the neck feels better.      She still has a chronic daily pressure headaches mostly in the front of the head but can be at the base of the  skull. There have been no spinning very episodes since our last visit. But she continues to experience the lightheadedness mostly in the upright position, sitting and standing but better laying down.      There is still arm weakness but not so much the tingling.   We discussed the plan going forward to escalate to trying muscle blocks and Botox. She is agreeable with this plan.      Interim History 5-27-22:  5-9-22: PROCEDURE NOTE: Anterior Scalene Muscle and Sternocleidomastoid Muscle (SCM) Injection Under Ultrasound Guidance.  Before the procedure, she reported a pain score of 6/10.   After the procedure, she reported a pain score of 6/10.      She was fine in the room, but once she got out into the [place]way she became very faint which lasted until she sat down. It lasted for about 5-minutes. She was able to drive home. For the rest of the day she felt better than her baseline. The lightheadedness/disorientation/pressure decreased it from 7/10 to 3/10 after two hours and it was 4/10 at 4 hours. She was back at her baseline by the evening. The next morning was the same as other morning. There was no neck tenderness. She is having arm tingling in the right once in a while, not too bad.      Was seen at the [facility] for question of CSF leak. CSF cisternogram from 1-18-21 was reviewed which was negative for a leak. She was a diagnosis of possible vestibular neuritis, vestibular migraine, and PPPD. Was referred to Jasmin Aceves in ENT with whom she has an appointment on 12-1-22.      She is still on topiramate 50mg BID. She is done with physical therapy. She is not taking sumatriptan usually, only for super lightheadedness. Her symptoms are particularly bad with head flexion triggering faintness. Head extension is okay. Also, liying on the right side will trigger faintness. There is no globus sensation. No throat pressure.      Plan:  1. Request Botox chronic migraine (failed topiramate and  amitriptyline).   2. If insufficiently helpful, would request vascular surgery evaluation for potential left styloid resection.      Interim History 9-23-22:  9-19-22: Bilateral occipital nerve blocks, trigger point injections.  Botox was not approved by insurance.      She has been very dizzy this summer. She has a lot of pressure in the ears, especially the left ear. She went to an ENT and had an MRI yesterday. She was given a diagnosis of vestibular migraine. She has been recommended to have trigger point injections which she had 4 days ago. This has not helped. She has tried a gluten free diet.     She has had really bad headaches in August over her son's wedding. This lasted about 5 days.      She has had no days with no head pressure. There is pressure behind the eyes. She is continuing to feel very off balanced and lightheaded. The lightheadedness is better sitting. When she stands, the lightheadedness is worse. Head movement will make her more lightheaded. Moving the head to the right is worse than moving to the left. Looking down is worse than looking up. Looking down and to the right is the worst. She is better is lying down. It makes the head pressure better. She has not fainted.      The arms and hands both feel weak. The fingers can get tingling. There is no color change. There is no swelling. There is no problem with washing hair.      She is taking topiramate 50mg BID. Of note she had a cisternogram at [facility] on January 2021 that was negative and had a opening pressure of 13.5mg H20. The CSF was normal except for an elevated protein of 71.      Chronic daily migraines with intractable vestibular symptoms. Patient has extracranial venous compression, perhaps as a  of these symptoms.      Plan:   1. 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 Consider referral to [facility] for [place]'s syndrome after vascular referral  3. Methazolamide titrated to 50mg BID. When at goal dose start  tapering the topiramate 25mg by each week.  4. Approval for Botox for chronic migraine with PM&R after clarification that he has failed 2.5 years of amitriptyline and 2.5 years of topiramate)  5. Follow-up in November 18th at 4pm for a video visit    INTERIM HISTORY 11/18/2022:  Botox had been denied by her insurance company despite a [id number] review and several requests. She had already failed over 2-years of topiramate and amitriptyline.     She was able to see Dr. Cristian Guadarrama on 11-9-22 for consideration of whether jugular vein compression could be contributing to her symptoms. She is scheduled for a venogram on 12-9-22. The appointment with [facility] on 12-1-22 has been cancelled.     Her headache and vertigo remained severe with head pressure every day and extreme sensitivity to head movement. There is continued bilateral ear pressure and tinnitus in the right ear because left ear is deaf. The tinnitus can change with bending over or with head movements.     Methazolamide titrated to 50mg BID went fine. She has been able to taper off the topiramate. She does feel more cognitively more foggy even without an increase in head pressure. There is neck tightness and pain symmetric. There is sometimes pain behind the right ear, where the styloid is.     She has no other major events. No falls. No numbness, tingling, or weakness in the hand.     PLAN:  1. Methazolamide 50mg BID tablets  2. Aware venogram for diagnosis of possible SCM syndrome      Current Outpatient Medications:      co-enzyme Q-10 100 MG CAPS capsule, , Disp: , Rfl:      COMBIPATCH 0.05-0.25 MG/DAY bi-weekly patch, , Disp: , Rfl:      DULoxetine (CYMBALTA) 30 MG capsule, , Disp: , Rfl:      estradiol (VAGIFEM) 10 MCG TABS vaginal tablet, Place 10 mcg vaginally, Disp: , Rfl:      fish oil-omega-3 fatty acids 1000 MG capsule, Take 2 g by mouth, Disp: , Rfl:      losartan (COZAAR) 25 MG tablet, Take 25 mg by mouth, Disp: , Rfl:      Magnesium  Glycinate 100 MG CAPS, , Disp: , Rfl:      methazolamide (NEPTAZANE) 25 MG tablet, Take 25mg once a day for 3 days. Increase by 25mg every 3 days until goal dose 50mg twice a day., Disp: 42 tablet, Rfl: 3     methazolamide (NEPTAZANE) 50 MG tablet, Take 1 tablet (50 mg) by mouth 2 times daily, Disp: 62 tablet, Rfl: 3     SUMAtriptan (IMITREX) 50 MG tablet, as needed, Disp: , Rfl:      traZODone (DESYREL) 50 MG tablet, At Bedtime, Disp: , Rfl:      Turmeric 500 MG CAPS, , Disp: , Rfl:      vitamin B-Complex, , Disp: , Rfl:     Current Facility-Administered Medications:      botulinum toxin type A (BOTOX) 100 units injection 100 Units, 100 Units, Intramuscular, Q90 Days, Lauren Mayo MD     Botulinum Toxin Type A (BOTOX) 200 units injection 200 Units, 200 Units, Intramuscular, Once, Toshia Palomo MD    24-minutes were spent in evaluation and counseling as well as documentation on the date of service. After a review of the patient s situation, this visit was changed from an in-person visit to a video visit to reduce the risk of COVID-19 exposure.        Sincerely,    Toshia CAMARILLO Cha, MD

## 2022-11-18 NOTE — Clinical Note
November 18, 2022       TO: Oral Singh  [address] [place] [address]       DearMs.Samantha,    We are writing to inform you of your test results.    {[facility] results letter list:[id number]}    No results found from the In Basket message.    ***

## 2023-01-09 NOTE — PROGRESS NOTES
[facility]    Outpatient Occupational Therapy Discharge Note  Patient: Oral Singh  : 1965    Beginning/End Dates of Reporting Period:  22    Referring Provider: Sania Olivia PA-C    Therapy Diagnosis: dizziness, unsteadiness, decreased ADL function    Client Self Report:      Goals: Unable to assess as patient did not return for follow up    Plan:  Discharge from therapy.

## 2023-01-09 NOTE — ADDENDUM NOTE
Encounter addended by: Libby Gray, OT on: 1/9/2023 7:59 AM   Actions taken: Episode resolved, Clinical Note Signed, Flowsheet accepted

## 2023-03-20 ENCOUNTER — MYC MEDICAL ADVICE (OUTPATIENT)
Dept: NEUROLOGY | Facility: CLINIC | Age: 58
End: 2023-03-20
Payer: COMMERCIAL

## 2023-03-20 DIAGNOSIS — G93.2 INTRACRANIAL PRESSURE INCREASED: ICD-10-CM

## 2023-03-20 DIAGNOSIS — G43.719 INTRACTABLE CHRONIC MIGRAINE WITHOUT AURA AND WITHOUT STATUS MIGRAINOSUS: ICD-10-CM

## 2023-03-20 RX ORDER — METHAZOLAMIDE 50 MG/1
50 TABLET ORAL 2 TIMES DAILY
Qty: 62 TABLET | Refills: 1 | Status: SHIPPED | OUTPATIENT
Start: 2023-03-20 | End: 2023-10-19

## 2023-04-12 ENCOUNTER — TRANSFERRED RECORDS (OUTPATIENT)
Dept: HEALTH INFORMATION MANAGEMENT | Facility: CLINIC | Age: 58
End: 2023-04-12

## 2023-04-14 ENCOUNTER — VIRTUAL VISIT (OUTPATIENT)
Dept: NEUROLOGY | Facility: CLINIC | Age: 58
End: 2023-04-14
Payer: COMMERCIAL

## 2023-04-14 DIAGNOSIS — G43.719 INTRACTABLE CHRONIC MIGRAINE WITHOUT AURA AND WITHOUT STATUS MIGRAINOSUS: Primary | ICD-10-CM

## 2023-04-14 DIAGNOSIS — M43.6 CONTRACTURE OF STERNOCLEIDOMASTOID MUSCLE: ICD-10-CM

## 2023-04-14 DIAGNOSIS — I87.1 COMPRESSION OF VEIN: ICD-10-CM

## 2023-04-14 DIAGNOSIS — M24.20 EAGLE'S SYNDROME: ICD-10-CM

## 2023-04-14 DIAGNOSIS — G54.0 TOS (THORACIC OUTLET SYNDROME): ICD-10-CM

## 2023-04-14 DIAGNOSIS — G24.3 CERVICAL DYSTONIA: ICD-10-CM

## 2023-04-14 DIAGNOSIS — M62.838 MUSCLE SPASM: ICD-10-CM

## 2023-04-14 PROCEDURE — 99214 OFFICE O/P EST MOD 30 MIN: CPT | Mod: VID | Performed by: PSYCHIATRY & NEUROLOGY

## 2023-04-14 RX ORDER — TOPIRAMATE 25 MG/1
TABLET, FILM COATED ORAL
Qty: 60 TABLET | Refills: 3 | Status: SHIPPED | OUTPATIENT
Start: 2023-04-14 | End: 2023-10-02

## 2023-04-14 RX ORDER — DIAZEPAM 2 MG
1 TABLET ORAL EVERY 6 HOURS PRN
Qty: 10 TABLET | Refills: 0 | Status: SHIPPED | OUTPATIENT
Start: 2023-04-14

## 2023-04-14 RX ORDER — METHAZOLAMIDE 25 MG/1
TABLET ORAL
Qty: 42 TABLET | Refills: 3 | Status: SHIPPED | OUTPATIENT
Start: 2023-04-14 | End: 2023-10-19

## 2023-04-14 NOTE — LETTER
"4/14/2023      RE: Oral Singh  [address] [place] [address]       The patient is being evaluated via a billable video visit.    How would you like to obtain your AVS? MyChart  If the video visit is dropped, the invitation should be resent by: Text to cell phone: [phone]  Will anyone else be joining your video visit? 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  She also has chronic daily pressure headaches and neck pain.  She has chronic bilateral upper extremity weakness. There is some chronic swelling in the hands but also in the feet more recently.  Paresthesias in the hand and the headache have been worse on the right side by history but on exam today paresthesias are worse in the left hand.  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      She continues to feel that the arms are more tired than the legs. Dizziness is worse with flexion. Tinnitus is worse with head extension. Headache is worse with extension. There is no throat pressure. No problems swallowing.      Ultrasound on 4-13-21 showed elevated right internal jugular vein velocities compared to the left but no areas of obstruction. CT venogram on 7-19-21 showed elongated styloid processes at 3.0/3.1cm bilaterally      She still experiences pressure on the top of the head at least once a week that can last 12-24 hours. It is not worse on one side. She is having the lightheadedness episodes throughout the day. She can trigger several a day triggered by fast head movements. They are lasting about 30-minutes to get back to baseline. This is worse with bending the head forward. She also " notes a chronic cough since the beginning of the year. She coughs throughout the day. She may wake up in the middle of the night with coughing. There have been no voice changes. No pressure in the throat. No pain when swallowing.      A trial of topiramate escalated to 100mg BID did not help, so she is down to 50mg BID. Symptoms did not get worse when she came down. She has not had physical therapy.     We discussed the 2 imaging studies and the relevance that they may have to her current symptoms.  I think it be worthwhile given there conservative nature to try a course of physical therapy addressing the thoracic outlet and sternocleidomastoid area.  We would also consider escalating her diagnostic testing with a referral to my colleagues in physical medicine and rehab for a muscle block.  She also notes that her lightheadedness is significantly worse with head flexion.  Given enough time past since her last CT scan she is agreeable to try to CT venogram in the head flexed position which I have ordered.  This may help us with choosing all the correct muscles to inject if she does end up being referred for a muscle block.     Plan:  1. Physical therapy for thoracic outlet as well sternocleidomastoid area  2. CT venogram in the neutral and head flexed positions.   3. Consider trial muscle block with PM&R.      Interim History 1-28-22:  CT venogram 10-9-21: Styloid process measures 2.9 cm on the right.  Styloid process measures 3.5 cm on the left. Mild right and moderate left upper internal jugular venous narrowing with flexion.     She has had 8 sessions of PT through December 2021. She would better on the day of the session but she would revert to her baseline sessions the next day. She didn't see any sustained improvement in the headache or dizziness but she does still do some neck stretches at home because the neck feels better.      She still has a chronic daily pressure headaches mostly in the front of the head  but can be at the base of the skull. There have been no spinning very episodes since our last visit. But she continues to experience the lightheadedness mostly in the upright position, sitting and standing but better laying down.      There is still arm weakness but not so much the tingling.   We discussed the plan going forward to escalate to trying muscle blocks and Botox. She is agreeable with this plan.      Interim History 5-27-22:  5-9-22: PROCEDURE NOTE: Anterior Scalene Muscle and Sternocleidomastoid Muscle (SCM) Injection Under Ultrasound Guidance.  Before the procedure, she reported a pain score of 6/10.   After the procedure, she reported a pain score of 6/10.      She was fine in the room, but once she got out into the aranda[place]way she became very faint which lasted until she sat down. It lasted for about 5-minutes. She was able to drive home. For the rest of the day she felt better than her baseline. The lightheadedness/disorientation/pressure decreased it from 7/10 to 3/10 after two hours and it was 4/10 at 4 hours. She was back at her baseline by the evening. The next morning was the same as other morning. There was no neck tenderness. She is having arm tingling in the right once in a while, not too bad.      Was seen at the [facility] for question of CSF leak. CSF cisternogram from 1-18-21 was reviewed which was negative for a leak. She was a diagnosis of possible vestibular neuritis, vestibular migraine, and PPPD. Was referred to Jasmin Aceves in ENT with whom she has an appointment on 12-1-22.      She is still on topiramate 50mg BID. She is done with physical therapy. She is not taking sumatriptan usually, only for super lightheadedness. Her symptoms are particularly bad with head flexion triggering faintness. Head extension is okay. Also, liying on the right side will trigger faintness. There is no globus sensation. No throat pressure.      Plan:  1. Request Botox chronic migraine  (failed topiramate and amitriptyline).   2. If insufficiently helpful, would request vascular surgery evaluation for potential left styloid resection.      Interim History 9-23-22:  9-19-22: Bilateral occipital nerve blocks, trigger point injections.  Botox was not approved by insurance.      She has been very dizzy this summer. She has a lot of pressure in the ears, especially the left ear. She went to an ENT and had an MRI yesterday. She was given a diagnosis of vestibular migraine. She has been recommended to have trigger point injections which she had 4 days ago. This has not helped. She has tried a gluten free diet.     She has had really bad headaches in August over her son's wedding. This lasted about 5 days.      She has had no days with no head pressure. There is pressure behind the eyes. She is continuing to feel very off balanced and lightheaded. The lightheadedness is better sitting. When she stands, the lightheadedness is worse. Head movement will make her more lightheaded. Moving the head to the right is worse than moving to the left. Looking down is worse than looking up. Looking down and to the right is the worst. She is better is lying down. It makes the head pressure better. She has not fainted.      The arms and hands both feel weak. The fingers can get tingling. There is no color change. There is no swelling. There is no problem with washing hair.      She is taking topiramate 50mg BID. Of note she had a cisternogram at [place] on January 2021 that was negative and had a opening pressure of 13.5mg H20. The CSF was normal except for an elevated protein of 71.      Chronic daily migraines with intractable vestibular symptoms. Patient has extracranial venous compression, perhaps as a  of these symptoms.      Plan:   1. 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 Consider referral to [place] for Togiak's syndrome after vascular referral  3. Methazolamide titrated to 50mg BID.  When at goal dose start tapering the topiramate 25mg by each week.  4. Approval for Botox for chronic migraine with PM&R after clarification that he has failed 2.5 years of amitriptyline and 2.5 years of topiramate)  5. Follow-up in November 18th at 4pm for a video visit     INTERIM HISTORY 11/18/2022:  Botox had been denied by her insurance company despite a [id number] review and several requests. She had already failed over 2-years of topiramate and amitriptyline.      She was able to see Dr. Cristian Guadarrama on 11-9-22 for consideration of whether jugular vein compression could be contributing to her symptoms. She is scheduled for a venogram on 12-9-22. The appointment with [facility] on 12-1-22 has been cancelled.      Her headache and vertigo remained severe with head pressure every day and extreme sensitivity to head movement. There is continued bilateral ear pressure and tinnitus in the right ear because left ear is deaf. The tinnitus can change with bending over or with head movements.      Methazolamide titrated to 50mg BID went fine. She has been able to taper off the topiramate. She does feel more cognitively more foggy even without an increase in head pressure. There is neck tightness and pain symmetric. There is sometimes pain behind the right ear, where the styloid is.      She has no other major events. No falls. No numbness, tingling, or weakness in the hand.      PLAN:  1. Methazolamide 50mg BID tablets  2. Aware venogram for diagnosis of possible SCM syndrome    Interim History 4/14/2023:  12-9-22:   Venogram with Dr. Guadarrama  DESCRIPTION OF PROCEDURE: Normal  The patient was brought to the operating room placed in the supine position. Bilateral groins were prepped and draped in usual manner. Ultrasound-guided access to the right common femoral vein was obtained and image was saved in the patient's chart. A 5 Turkmen sheath was introduced in the right common femoral vein. We selected the left internal  jugular vein and obtained a venogram with the patient's neck in the neutral position which showed no extrinsic compression of the jugular vein at the level of the base of the skull or in the neck. With the neck rotated and flexed to the left there was no evidence of extrinsic compression of the jugular vein. We then selected the right internal jugular vein and again performed a diagnostic venogram at rest which showed no flow-limiting stenosis at the base of the skull or in the neck. When the head turned to the right he had flexed there was no evidence of extrinsic compression of the jugular vein. Satisfied with the diagnostic quality of the pictures we withdrew the wire and catheter, the sheath was pulled out and manual pressure was held for 5 minutes.    She still has daily dizziness and light headedness. She is [facility] for NUCCA treatment for C1 adjustment. She feels better right after the adjustment but it lasts maybe 24 hrs at best. It does help with both the headache and the lightheadedness. She was doing weekly for 4 weeks but now she is doing every other week. She last did PT at the end of 2021.     She is on methazolamide 50mg BID is the same as topiramate as 50mg BID. Both had the same efficacy for the headache but topiramate is much cheaper.    She has stopped working since our last appointment due to symptoms.  She was previously on amitriptyline and topiramate.   She does continues to have a lot of neck soreness, especially under the right ear.   Had an episode of severe rocking vertigo after driving in a car.     Plan:  1. Taper methazolamide and titrate up topiramate  2. Trial of Emgality for intractable migraines  3. 1mg of diazepam for flying, avoid alcohol.  4. Neck protection during flying  5. Continue maintenance NUCCA   6. Add posterior neck strengthening exercises    DATA:  I personally reviewed the following data.    Last brain imaging:  MA Screen Bilateral w/Jeffery  BILATERAL FULL FIELD  DIGITAL SCREENING MAMMOGRAM WITH TOMOSYNTHESIS    Performed on: 5/19/22    No comparisons were made when reading this study.    Technique:  This study was evaluated with the assistance of Computer-Aided   Detection.  Breast Tomosynthesis was used in interpretation.    Findings: The breasts are heterogeneously dense, which may obscure small   masses.  There is no radiographic evidence of malignancy.     IMPRESSION: ACR BI-RADS Category 1: Negative    RECOMMENDED FOLLOW-UP: Annual routine screening mammogram    The results and recommendations of this examination will be communicated   to the patient.    31-minutes were spent in evaluation, examination, and counseling as well as documentation on the date of service. After a review of the patient s situation, this visit was changed from an in-person visit to a video visit to reduce the risk of COVID-19 exposure.        Toshia CAMARILLO Cha, MD

## 2023-04-14 NOTE — PROGRESS NOTES
"The patient is being evaluated via a billable video visit.    How would you like to obtain your AVS? MyChart  If the video visit is dropped, the invitation should be resent by: Text to cell phone: [phone]  Will anyone else be joining your video visit? No      Video-Visit Details  Type of service:  Video Visit  Video Start Time:3:09 PM  Video End Time:3:37 PM  Originating Location (pt. Location): Home  Distant Location (provider location):  [facility] [place]   Platform used for Video Visit: 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  She also has chronic daily pressure headaches and neck pain.  She has chronic bilateral upper extremity weakness. There is some chronic swelling in the hands but also in the feet more recently.  Paresthesias in the hand and the headache have been worse on the right side by history but on exam today paresthesias are worse in the left hand.  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      She continues to feel that the arms are more tired than the legs. Dizziness is worse with flexion. Tinnitus is worse with head extension. Headache is worse with extension. There is no throat pressure. No problems swallowing.      Ultrasound on 4-13-21 showed elevated right internal jugular vein velocities compared to the left but no areas of obstruction. CT venogram on 7-19-21 showed elongated styloid processes at 3.0/3.1cm bilaterally      She still experiences pressure on the top of the head at least once a week that can last 12-24 hours. It is not worse on one side. She is having the lightheadedness episodes " throughout the day. She can trigger several a day triggered by fast head movements. They are lasting about 30-minutes to get back to baseline. This is worse with bending the head forward. She also notes a chronic cough since the beginning of the year. She coughs throughout the day. She may wake up in the middle of the night with coughing. There have been no voice changes. No pressure in the throat. No pain when swallowing.      A trial of topiramate escalated to 100mg BID did not help, so she is down to 50mg BID. Symptoms did not get worse when she came down. She has not had physical therapy.     We discussed the 2 imaging studies and the relevance that they may have to her current symptoms.  I think it be worthwhile given there conservative nature to try a course of physical therapy addressing the thoracic outlet and sternocleidomastoid area.  We would also consider escalating her diagnostic testing with a referral to my colleagues in physical medicine and rehab for a muscle block.  She also notes that her lightheadedness is significantly worse with head flexion.  Given enough time past since her last CT scan she is agreeable to try to CT venogram in the head flexed position which I have ordered.  This may help us with choosing all the correct muscles to inject if she does end up being referred for a muscle block.     Plan:  1. Physical therapy for thoracic outlet as well sternocleidomastoid area  2. CT venogram in the neutral and head flexed positions.   3. Consider trial muscle block with PM&R.      Interim History 1-28-22:  CT venogram 10-9-21: Styloid process measures 2.9 cm on the right.  Styloid process measures 3.5 cm on the left. Mild right and moderate left upper internal jugular venous narrowing with flexion.     She has had 8 sessions of PT through December 2021. She would better on the day of the session but she would revert to her baseline sessions the next day. She didn't see any sustained improvement  in the headache or dizziness but she does still do some neck stretches at home because the neck feels better.      She still has a chronic daily pressure headaches mostly in the front of the head but can be at the base of the skull. There have been no spinning very episodes since our last visit. But she continues to experience the lightheadedness mostly in the upright position, sitting and standing but better laying down.      There is still arm weakness but not so much the tingling.   We discussed the plan going forward to escalate to trying muscle blocks and Botox. She is agreeable with this plan.      Interim History 5-27-22:  5-9-22: PROCEDURE NOTE: Anterior Scalene Muscle and Sternocleidomastoid Muscle (SCM) Injection Under Ultrasound Guidance.  Before the procedure, she reported a pain score of 6/10.   After the procedure, she reported a pain score of 6/10.      She was fine in the room, but once she got out into the [place]anda[place]way she became very faint which lasted until she sat down. It lasted for about 5-minutes. She was able to drive home. For the rest of the day she felt better than her baseline. The lightheadedness/disorientation/pressure decreased it from 7/10 to 3/10 after two hours and it was 4/10 at 4 hours. She was back at her baseline by the evening. The next morning was the same as other morning. There was no neck tenderness. She is having arm tingling in the right once in a while, not too bad.      Was seen at the [facility] for question of CSF leak. CSF cisternogram from 1-18-21 was reviewed which was negative for a leak. She was a diagnosis of possible vestibular neuritis, vestibular migraine, and PPPD. Was referred to Jasmin Aceves in ENT with whom she has an appointment on 12-1-22.      She is still on topiramate 50mg BID. She is done with physical therapy. She is not taking sumatriptan usually, only for super lightheadedness. Her symptoms are particularly bad with head flexion  triggering faintness. Head extension is okay. Also, liying on the right side will trigger faintness. There is no globus sensation. No throat pressure.      Plan:  1. Request Botox chronic migraine (failed topiramate and amitriptyline).   2. If insufficiently helpful, would request vascular surgery evaluation for potential left styloid resection.      Interim History 9-23-22:  9-19-22: Bilateral occipital nerve blocks, trigger point injections.  Botox was not approved by insurance.      She has been very dizzy this summer. She has a lot of pressure in the ears, especially the left ear. She went to an ENT and had an MRI yesterday. She was given a diagnosis of vestibular migraine. She has been recommended to have trigger point injections which she had 4 days ago. This has not helped. She has tried a gluten free diet.     She has had really bad headaches in August over her son's wedding. This lasted about 5 days.      She has had no days with no head pressure. There is pressure behind the eyes. She is continuing to feel very off balanced and lightheaded. The lightheadedness is better sitting. When she stands, the lightheadedness is worse. Head movement will make her more lightheaded. Moving the head to the right is worse than moving to the left. Looking down is worse than looking up. Looking down and to the right is the worst. She is better is lying down. It makes the head pressure better. She has not fainted.      The arms and hands both feel weak. The fingers can get tingling. There is no color change. There is no swelling. There is no problem with washing hair.      She is taking topiramate 50mg BID. Of note she had a cisternogram at [place] on January 2021 that was negative and had a opening pressure of 13.5mg H20. The CSF was normal except for an elevated protein of 71.      Chronic daily migraines with intractable vestibular symptoms. Patient has extracranial venous compression, perhaps as a  of these  symptoms.      Plan:   1. 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 Consider referral to [place] for [place]'s syndrome after vascular referral  3. Methazolamide titrated to 50mg BID. When at goal dose start tapering the topiramate 25mg by each week.  4. Approval for Botox for chronic migraine with PM&R after clarification that he has failed 2.5 years of amitriptyline and 2.5 years of topiramate)  5. Follow-up in November 18th at 4pm for a video visit     INTERIM HISTORY 11/18/2022:  Botox had been denied by her insurance company despite a [id number] review and several requests. She had already failed over 2-years of topiramate and amitriptyline.      She was able to see Dr. Cristian Guadarrama on 11-9-22 for consideration of whether jugular vein compression could be contributing to her symptoms. She is scheduled for a venogram on 12-9-22. The appointment with [facility] on 12-1-22 has been cancelled.      Her headache and vertigo remained severe with head pressure every day and extreme sensitivity to head movement. There is continued bilateral ear pressure and tinnitus in the right ear because left ear is deaf. The tinnitus can change with bending over or with head movements.      Methazolamide titrated to 50mg BID went fine. She has been able to taper off the topiramate. She does feel more cognitively more foggy even without an increase in head pressure. There is neck tightness and pain symmetric. There is sometimes pain behind the right ear, where the styloid is.      She has no other major events. No falls. No numbness, tingling, or weakness in the hand.      PLAN:  1. Methazolamide 50mg BID tablets  2. Aware venogram for diagnosis of possible SCM syndrome    Interim History 4/14/2023:  12-9-22:   Venogram with Dr. Guadarrama  DESCRIPTION OF PROCEDURE: Normal  The patient was brought to the operating room placed in the supine position. Bilateral groins were prepped and draped in usual manner.  Ultrasound-guided access to the right common femoral vein was obtained and image was saved in the patient's chart. A 5 Russian sheath was introduced in the right common femoral vein. We selected the left internal jugular vein and obtained a venogram with the patient's neck in the neutral position which showed no extrinsic compression of the jugular vein at the level of the base of the skull or in the neck. With the neck rotated and flexed to the left there was no evidence of extrinsic compression of the jugular vein. We then selected the right internal jugular vein and again performed a diagnostic venogram at rest which showed no flow-limiting stenosis at the base of the skull or in the neck. When the head turned to the right he had flexed there was no evidence of extrinsic compression of the jugular vein. Satisfied with the diagnostic quality of the pictures we withdrew the wire and catheter, the sheath was pulled out and manual pressure was held for 5 minutes.    She still has daily dizziness and light headedness. She is [place] [facility] for NUCCA treatment for C1 adjustment. She feels better right after the adjustment but it lasts maybe 24 hrs at best. It does help with both the headache and the lightheadedness. She was doing weekly for 4 weeks but now she is doing every other week. She last did PT at the end of 2021.     She is on methazolamide 50mg BID is the same as topiramate as 50mg BID. Both had the same efficacy for the headache but topiramate is much cheaper.    She has stopped working since our last appointment due to symptoms.  She was previously on amitriptyline and topiramate.   She does continues to have a lot of neck soreness, especially under the right ear.   Had an episode of severe rocking vertigo after driving in a car.     Plan:  1. Taper methazolamide and titrate up topiramate  2. Trial of Emgality for intractable migraines  3. 1mg of diazepam for flying, avoid alcohol.  4. Neck protection during  flying  5. Continue maintenance NUCCA   6. Add posterior neck strengthening exercises    DATA:  I personally reviewed the following data.    Last brain imaging:  MA Screen Bilateral w/Jeffery  BILATERAL FULL FIELD DIGITAL SCREENING MAMMOGRAM WITH TOMOSYNTHESIS    Performed on: 5/19/22    No comparisons were made when reading this study.    Technique:  This study was evaluated with the assistance of Computer-Aided   Detection.  Breast Tomosynthesis was used in interpretation.    Findings: The breasts are heterogeneously dense, which may obscure small   masses.  There is no radiographic evidence of malignancy.     IMPRESSION: ACR BI-RADS Category 1: Negative    RECOMMENDED FOLLOW-UP: Annual routine screening mammogram    The results and recommendations of this examination will be communicated   to the patient.    31-minutes were spent in evaluation, examination, and counseling as well as documentation on the date of service. After a review of the patient s situation, this visit was changed from an in-person visit to a video visit to reduce the risk of COVID-19 exposure.

## 2023-04-14 NOTE — LETTER
"4/14/2023       RE: Oral Singh  [address] [place] [address]       Dear Colleague,    Thank you for referring your patient, Oral Singh, to the [facility] [place] at [facility]. Please see a copy of my visit note below.    The patient is being evaluated via a billable video visit.    How would you like to obtain your AVS? MyChart  If the video visit is dropped, the invitation should be resent by: Text to cell phone: [phone]  Will anyone else be joining your video visit? No      Video-Visit Details  Type of service:  Video Visit  Video Start Time:3:09 PM  Video End Time:3:37 PM  Originating Location (pt. 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  She also has chronic daily pressure headaches and neck pain.  She has chronic bilateral upper extremity weakness. There is some chronic swelling in the hands but also in the feet more recently.  Paresthesias in the hand and the headache have been worse on the right side by history but on exam today paresthesias are worse in the left hand.  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      She continues to feel that the arms are more tired than the legs. Dizziness is worse with flexion. Tinnitus is worse with head extension. Headache is worse with extension. There is no throat pressure. No problems swallowing.      Ultrasound on 4-13-21 showed elevated right internal jugular vein velocities " compared to the left but no areas of obstruction. CT venogram on 7-19-21 showed elongated styloid processes at 3.0/3.1cm bilaterally      She still experiences pressure on the top of the head at least once a week that can last 12-24 hours. It is not worse on one side. She is having the lightheadedness episodes throughout the day. She can trigger several a day triggered by fast head movements. They are lasting about 30-minutes to get back to baseline. This is worse with bending the head forward. She also notes a chronic cough since the beginning of the year. She coughs throughout the day. She may wake up in the middle of the night with coughing. There have been no voice changes. No pressure in the throat. No pain when swallowing.      A trial of topiramate escalated to 100mg BID did not help, so she is down to 50mg BID. Symptoms did not get worse when she came down. She has not had physical therapy.     We discussed the 2 imaging studies and the relevance that they may have to her current symptoms.  I think it be worthwhile given there conservative nature to try a course of physical therapy addressing the thoracic outlet and sternocleidomastoid area.  We would also consider escalating her diagnostic testing with a referral to my colleagues in physical medicine and rehab for a muscle block.  She also notes that her lightheadedness is significantly worse with head flexion.  Given enough time past since her last CT scan she is agreeable to try to CT venogram in the head flexed position which I have ordered.  This may help us with choosing all the correct muscles to inject if she does end up being referred for a muscle block.     Plan:  1. Physical therapy for thoracic outlet as well sternocleidomastoid area  2. CT venogram in the neutral and head flexed positions.   3. Consider trial muscle block with PM&R.      Interim History 1-28-22:  CT venogram 10-9-21: Styloid process measures 2.9 cm on the right.  Styloid process  measures 3.5 cm on the left. Mild right and moderate left upper internal jugular venous narrowing with flexion.     She has had 8 sessions of PT through December 2021. She would better on the day of the session but she would revert to her baseline sessions the next day. She didn't see any sustained improvement in the headache or dizziness but she does still do some neck stretches at home because the neck feels better.      She still has a chronic daily pressure headaches mostly in the front of the head but can be at the base of the skull. There have been no spinning very episodes since our last visit. But she continues to experience the lightheadedness mostly in the upright position, sitting and standing but better laying down.      There is still arm weakness but not so much the tingling.   We discussed the plan going forward to escalate to trying muscle blocks and Botox. She is agreeable with this plan.      Interim History 5-27-22:  5-9-22: PROCEDURE NOTE: Anterior Scalene Muscle and Sternocleidomastoid Muscle (SCM) Injection Under Ultrasound Guidance.  Before the procedure, she reported a pain score of 6/10.   After the procedure, she reported a pain score of 6/10.      She was fine in the room, but once she got out into the aranda-way she became very faint which lasted until she sat down. It lasted for about 5-minutes. She was able to drive home. For the rest of the day she felt better than her baseline. The lightheadedness/disorientation/pressure decreased it from 7/10 to 3/10 after two hours and it was 4/10 at 4 hours. She was back at her baseline by the evening. The next morning was the same as other morning. There was no neck tenderness. She is having arm tingling in the right once in a while, not too bad.      Was seen at the [facility] for question of CSF leak. CSF cisternogram from 1-18-21 was reviewed which was negative for a leak. She was a diagnosis of possible vestibular neuritis,  vestibular migraine, and PPPD. Was referred to Jasmin Aceves in ENT with whom she has an appointment on 12-1-22.      She is still on topiramate 50mg BID. She is done with physical therapy. She is not taking sumatriptan usually, only for super lightheadedness. Her symptoms are particularly bad with head flexion triggering faintness. Head extension is okay. Also, liying on the right side will trigger faintness. There is no globus sensation. No throat pressure.      Plan:  1. Request Botox chronic migraine (failed topiramate and amitriptyline).   2. If insufficiently helpful, would request vascular surgery evaluation for potential left styloid resection.      Interim History 9-23-22:  9-19-22: Bilateral occipital nerve blocks, trigger point injections.  Botox was not approved by insurance.      She has been very dizzy this summer. She has a lot of pressure in the ears, especially the left ear. She went to an ENT and had an MRI yesterday. She was given a diagnosis of vestibular migraine. She has been recommended to have trigger point injections which she had 4 days ago. This has not helped. She has tried a gluten free diet.     She has had really bad headaches in August over her son's wedding. This lasted about 5 days.      She has had no days with no head pressure. There is pressure behind the eyes. She is continuing to feel very off balanced and lightheaded. The lightheadedness is better sitting. When she stands, the lightheadedness is worse. Head movement will make her more lightheaded. Moving the head to the right is worse than moving to the left. Looking down is worse than looking up. Looking down and to the right is the worst. She is better is lying down. It makes the head pressure better. She has not fainted.      The arms and hands both feel weak. The fingers can get tingling. There is no color change. There is no swelling. There is no problem with washing hair.      She is taking topiramate 50mg BID. Of  note she had a cisternogram at [facility] on January 2021 that was negative and had a opening pressure of 13.5mg H20. The CSF was normal except for an elevated protein of 71.      Chronic daily migraines with intractable vestibular symptoms. Patient has extracranial venous compression, perhaps as a  of these symptoms.      Plan:   1. 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 Methazolamide titrated to 50mg BID. When at goal dose start tapering the topiramate 25mg by each week.  4. Approval for Botox for chronic migraine with PM&R after clarification that he has failed 2.5 years of amitriptyline and 2.5 years of topiramate)  5. Follow-up in November 18th at 4pm for a video visit     INTERIM HISTORY 11/18/2022:  Botox had been denied by her insurance company despite a [id number] review and several requests. She had already failed over 2-years of topiramate and amitriptyline.      She was able to see Dr. Cristian Guadarrama on 11-9-22 for consideration of whether jugular vein compression could be contributing to her symptoms. She is scheduled for a venogram on 12-9-22. The appointment with [facility] on 12-1-22 has been cancelled.      Her headache and vertigo remained severe with head pressure every day and extreme sensitivity to head movement. There is continued bilateral ear pressure and tinnitus in the right ear because left ear is deaf. The tinnitus can change with bending over or with head movements.      Methazolamide titrated to 50mg BID went fine. She has been able to taper off the topiramate. She does feel more cognitively more foggy even without an increase in head pressure. There is neck tightness and pain symmetric. There is sometimes pain behind the right ear, where the styloid is.      She has no other major events. No falls. No numbness, tingling, or weakness in the hand.      PLAN:  1. Methazolamide 50mg BID  tablets  2. Aware venogram for diagnosis of possible SCM syndrome    Interim History 4/14/2023:  12-9-22:   Venogram with Dr. Guadarrama  DESCRIPTION OF PROCEDURE: Normal  The patient was brought to the operating room placed in the supine position. Bilateral groins were prepped and draped in usual manner. Ultrasound-guided access to the right common femoral vein was obtained and image was saved in the patient's chart. A 5 Urdu sheath was introduced in the right common femoral vein. We selected the left internal jugular vein and obtained a venogram with the patient's neck in the neutral position which showed no extrinsic compression of the jugular vein at the level of the base of the skull or in the neck. With the neck rotated and flexed to the left there was no evidence of extrinsic compression of the jugular vein. We then selected the right internal jugular vein and again performed a diagnostic venogram at rest which showed no flow-limiting stenosis at the base of the skull or in the neck. When the head turned to the right he had flexed there was no evidence of extrinsic compression of the jugular vein. Satisfied with the diagnostic quality of the pictures we withdrew the wire and catheter, the sheath was pulled out and manual pressure was held for 5 minutes.    She still has daily dizziness and light headedness. She is Duncan Peter for NUCCA treatment for C1 adjustment. She feels better right after the adjustment but it lasts maybe 24 hrs at best. It does help with both the headache and the lightheadedness. She was doing weekly for 4 weeks but now she is doing every other week. She last did PT at the end of 2021.     She is on methazolamide 50mg BID is the same as topiramate as 50mg BID. Both had the same efficacy for the headache but topiramate is much cheaper.    She has stopped working since our last appointment due to symptoms.  She was previously on amitriptyline and topiramate.   She does continues to have a  lot of neck soreness, especially under the right ear.   Had an episode of severe rocking vertigo after driving in a car.     Plan:  1. Taper methazolamide and titrate up topiramate  2. Trial of Emgality for intractable migraines  3. 1mg of diazepam for flying, avoid alcohol.  4. Neck protection during flying  5. Continue maintenance NUCCA   6. Add posterior neck strengthening exercises    DATA:  I personally reviewed the following data.    Last brain imaging:  MA Screen Bilateral w/Jeffery  BILATERAL FULL FIELD DIGITAL SCREENING MAMMOGRAM WITH TOMOSYNTHESIS    Performed on: 5/19/22    No comparisons were made when reading this study.    Technique:  This study was evaluated with the assistance of Computer-Aided   Detection.  Breast Tomosynthesis was used in interpretation.    Findings: The breasts are heterogeneously dense, which may obscure small   masses.  There is no radiographic evidence of malignancy.     IMPRESSION: ACR BI-RADS Category 1: Negative    RECOMMENDED FOLLOW-UP: Annual routine screening mammogram    The results and recommendations of this examination will be communicated   to the patient.    31-minutes were spent in evaluation, examination, and counseling as well as documentation on the date of service. After a review of the patient s situation, this visit was changed from an in-person visit to a video visit to reduce the risk of COVID-19 exposure.          Again, thank you for allowing me to participate in the care of your patient.      Sincerely,    Toshia CAMARILLO Cha, MD

## 2023-06-22 ENCOUNTER — MEDICAL CORRESPONDENCE (OUTPATIENT)
Dept: HEALTH INFORMATION MANAGEMENT | Facility: CLINIC | Age: 58
End: 2023-06-22
Payer: COMMERCIAL

## 2023-06-26 ENCOUNTER — TRANSCRIBE ORDERS (OUTPATIENT)
Dept: OTHER | Age: 58
End: 2023-06-26

## 2023-06-26 DIAGNOSIS — N76.2 VULVITIS: Primary | ICD-10-CM

## 2023-06-26 DIAGNOSIS — L40.9 PSORIASIS: ICD-10-CM

## 2023-10-02 DIAGNOSIS — G43.719 INTRACTABLE CHRONIC MIGRAINE WITHOUT AURA AND WITHOUT STATUS MIGRAINOSUS: ICD-10-CM

## 2023-10-02 RX ORDER — TOPIRAMATE 25 MG/1
TABLET, FILM COATED ORAL
Qty: 60 TABLET | Refills: 3 | Status: SHIPPED | OUTPATIENT
Start: 2023-10-02 | End: 2023-10-19

## 2023-10-19 DIAGNOSIS — G43.719 INTRACTABLE CHRONIC MIGRAINE WITHOUT AURA AND WITHOUT STATUS MIGRAINOSUS: ICD-10-CM

## 2023-10-19 RX ORDER — TOPIRAMATE 50 MG/1
50 TABLET, FILM COATED ORAL 2 TIMES DAILY
Qty: 60 TABLET | Refills: 11 | Status: SHIPPED | OUTPATIENT
Start: 2023-10-19 | End: 2024-04-26

## 2023-10-27 ENCOUNTER — VIRTUAL VISIT (OUTPATIENT)
Dept: NEUROLOGY | Facility: CLINIC | Age: 58
End: 2023-10-27
Payer: COMMERCIAL

## 2023-10-27 VITALS — WEIGHT: 152 LBS | BODY MASS INDEX: 26.09 KG/M2

## 2023-10-27 DIAGNOSIS — I87.1 COMPRESSION OF VEIN: ICD-10-CM

## 2023-10-27 DIAGNOSIS — M48.02 SPINAL STENOSIS IN CERVICAL REGION: ICD-10-CM

## 2023-10-27 DIAGNOSIS — I87.1 MAY-THURNER SYNDROME: ICD-10-CM

## 2023-10-27 DIAGNOSIS — G24.3 CERVICAL DYSTONIA: ICD-10-CM

## 2023-10-27 DIAGNOSIS — G43.719 INTRACTABLE CHRONIC MIGRAINE WITHOUT AURA AND WITHOUT STATUS MIGRAINOSUS: Primary | ICD-10-CM

## 2023-10-27 DIAGNOSIS — G43.809 MIGRAINE VARIANT: ICD-10-CM

## 2023-10-27 DIAGNOSIS — I87.1 NUTCRACKER PHENOMENON OF RENAL VEIN: ICD-10-CM

## 2023-10-27 DIAGNOSIS — R42 VERTIGO: ICD-10-CM

## 2023-10-27 PROCEDURE — 99215 OFFICE O/P EST HI 40 MIN: CPT | Mod: VID | Performed by: PSYCHIATRY & NEUROLOGY

## 2023-10-27 RX ORDER — SUMATRIPTAN 50 MG/1
50 TABLET, FILM COATED ORAL PRN
Qty: 18 TABLET | Refills: 4 | Status: SHIPPED | OUTPATIENT
Start: 2023-10-27

## 2023-10-27 ASSESSMENT — PAIN SCALES - GENERAL: PAINLEVEL: NO PAIN (0)

## 2023-10-27 NOTE — PROGRESS NOTES
"Virtual Visit Details    Type of service:  Video Visit     Originating Location (pt. Location): {video visit patient location:304036::\"Home\"}  {PROVIDER LOCATION On-site should be selected for visits conducted from your clinic location or adjoining [facility] hospital, academic office, or other nearby [facility] building. Off-site should be selected for all other provider locations, including home:[id number]}  Distant Location (provider location):  {virtual location provider:643731}  Platform used for Video Visit: {Virtual Visit Platforms:841115::\"[employer]\"}    "

## 2023-10-27 NOTE — PROGRESS NOTES
"The patient is being evaluated via a billable video visit.    How would you like to obtain your AVS? Mail a copy  If the video visit is dropped, the invitation should be resent by: Send to e-mail at: [email or web address]  Will anyone else be joining your video visit? No      Video-Visit Details  Type of service:  Video Visit  Video Start Time:2:32 PM  Video End Time:3:10 PM  Originating Location (pt. 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  She also has chronic daily pressure headaches and neck pain.  She has chronic bilateral upper extremity weakness. There is some chronic swelling in the hands but also in the feet more recently.  Paresthesias in the hand and the headache have been worse on the right side by history but on exam today paresthesias are worse in the left hand.  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      She continues to feel that the arms are more tired than the legs. Dizziness is worse with flexion. Tinnitus is worse with head extension. Headache is worse with extension. There is no throat pressure. No problems swallowing.      Ultrasound on 4-13-21 showed elevated right internal jugular vein velocities compared to the left but no areas of obstruction. CT venogram on 7-19-21 showed elongated styloid processes at 3.0/3.1cm bilaterally      She still experiences pressure on the top of the head at least once a week that can last 12-24 hours. It is not worse on one side. She is having the " lightheadedness episodes throughout the day. She can trigger several a day triggered by fast head movements. They are lasting about 30-minutes to get back to baseline. This is worse with bending the head forward. She also notes a chronic cough since the beginning of the year. She coughs throughout the day. She may wake up in the middle of the night with coughing. There have been no voice changes. No pressure in the throat. No pain when swallowing.      A trial of topiramate escalated to 100mg BID did not help, so she is down to 50mg BID. Symptoms did not get worse when she came down. She has not had physical therapy.     We discussed the 2 imaging studies and the relevance that they may have to her current symptoms.  I think it be worthwhile given there conservative nature to try a course of physical therapy addressing the thoracic outlet and sternocleidomastoid area.  We would also consider escalating her diagnostic testing with a referral to my colleagues in physical medicine and rehab for a muscle block.  She also notes that her lightheadedness is significantly worse with head flexion.  Given enough time past since her last CT scan she is agreeable to try to CT venogram in the head flexed position which I have ordered.  This may help us with choosing all the correct muscles to inject if she does end up being referred for a muscle block.     Plan:  1. Physical therapy for thoracic outlet as well sternocleidomastoid area  2. CT venogram in the neutral and head flexed positions.   3. Consider trial muscle block with PM&R.      Interim History 1-28-22:  CT venogram 10-9-21: Styloid process measures 2.9 cm on the right.  Styloid process measures 3.5 cm on the left. Mild right and moderate left upper internal jugular venous narrowing with flexion.     She has had 8 sessions of PT through December 2021. She would better on the day of the session but she would revert to her baseline sessions the next day. She didn't see  any sustained improvement in the headache or dizziness but she does still do some neck stretches at home because the neck feels better.      She still has a chronic daily pressure headaches mostly in the front of the head but can be at the base of the skull. There have been no spinning very episodes since our last visit. But she continues to experience the lightheadedness mostly in the upright position, sitting and standing but better laying down.      There is still arm weakness but not so much the tingling.   We discussed the plan going forward to escalate to trying muscle blocks and Botox. She is agreeable with this plan.      Interim History 5-27-22:  5-9-22: PROCEDURE NOTE: Anterior Scalene Muscle and Sternocleidomastoid Muscle (SCM) Injection Under Ultrasound Guidance.  Before the procedure, she reported a pain score of 6/10.   After the procedure, she reported a pain score of 6/10.      She was fine in the room, but once she got out into the aranda[place]way she became very faint which lasted until she sat down. It lasted for about 5-minutes. She was able to drive home. For the rest of the day she felt better than her baseline. The lightheadedness/disorientation/pressure decreased it from 7/10 to 3/10 after two hours and it was 4/10 at 4 hours. She was back at her baseline by the evening. The next morning was the same as other morning. There was no neck tenderness. She is having arm tingling in the right once in a while, not too bad.      Was seen at the [facility] for question of CSF leak. CSF cisternogram from 1-18-21 was reviewed which was negative for a leak. She was a diagnosis of possible vestibular neuritis, vestibular migraine, and PPPD. Was referred to Jasmin Aceves in ENT with whom she has an appointment on 12-1-22.      She is still on topiramate 50mg BID. She is done with physical therapy. She is not taking sumatriptan usually, only for super lightheadedness. Her symptoms are  particularly bad with head flexion triggering faintness. Head extension is okay. Also, liying on the right side will trigger faintness. There is no globus sensation. No throat pressure.      Plan:  1. Request Botox chronic migraine (failed topiramate and amitriptyline).   2. If insufficiently helpful, would request vascular surgery evaluation for potential left styloid resection.      Interim History 9-23-22:  9-19-22: Bilateral occipital nerve blocks, trigger point injections.  Botox was not approved by insurance.      She has been very dizzy this summer. She has a lot of pressure in the ears, especially the left ear. She went to an ENT and had an MRI yesterday. She was given a diagnosis of vestibular migraine. She has been recommended to have trigger point injections which she had 4 days ago. This has not helped. She has tried a gluten free diet.     She has had really bad headaches in August over her son's wedding. This lasted about 5 days.      She has had no days with no head pressure. There is pressure behind the eyes. She is continuing to feel very off balanced and lightheaded. The lightheadedness is better sitting. When she stands, the lightheadedness is worse. Head movement will make her more lightheaded. Moving the head to the right is worse than moving to the left. Looking down is worse than looking up. Looking down and to the right is the worst. She is better is lying down. It makes the head pressure better. She has not fainted.      The arms and hands both feel weak. The fingers can get tingling. There is no color change. There is no swelling. There is no problem with washing hair.      She is taking topiramate 50mg BID. Of note she had a cisternogram at [facility] on January 2021 that was negative and had a opening pressure of 13.5mg H20. The CSF was normal except for an elevated protein of 71.      Chronic daily migraines with intractable vestibular symptoms. Patient has extracranial venous compression,  perhaps as a  of these symptoms.      Plan:   1. 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 Consider referral to [place] for Dieterich's syndrome after vascular referral  3. Methazolamide titrated to 50mg BID. When at goal dose start tapering the topiramate 25mg by each week.  4. Approval for Botox for chronic migraine with PM&R after clarification that he has failed 2.5 years of amitriptyline and 2.5 years of topiramate)  5. Follow-up in November 18th at 4pm for a video visit     INTERIM HISTORY 11/18/2022:  Botox had been denied by her insurance company despite a [id number] review and several requests. She had already failed over 2-years of topiramate and amitriptyline.      She was able to see Dr. Cristian Guadarrama on 11-9-22 for consideration of whether jugular vein compression could be contributing to her symptoms. She is scheduled for a venogram on 12-9-22. The appointment with [facility] on 12-1-22 has been cancelled.      Her headache and vertigo remained severe with head pressure every day and extreme sensitivity to head movement. There is continued bilateral ear pressure and tinnitus in the right ear because left ear is deaf. The tinnitus can change with bending over or with head movements.      Methazolamide titrated to 50mg BID went fine. She has been able to taper off the topiramate. She does feel more cognitively more foggy even without an increase in head pressure. There is neck tightness and pain symmetric. There is sometimes pain behind the right ear, where the styloid is.      She has no other major events. No falls. No numbness, tingling, or weakness in the hand.      PLAN:  1. Methazolamide 50mg BID tablets  2. Aware venogram for diagnosis of possible SCM syndrome     Interim History 4/14/2023:  12-9-22:  Venogram with Dr. Guadarrama: Normal     She still has daily dizziness and light headedness. She is seeing Duncan Peter for NUCCA treatment for C1 adjustment. She feels better  right after the adjustment but it lasts maybe 24 hrs at best. It does help with both the headache and the lightheadedness. She was doing weekly for 4 weeks but now she is doing every other week. She last did PT at the end of 2021.      She is on methazolamide 50mg BID is the same as topiramate as 50mg BID. Both had the same efficacy for the headache but topiramate is much cheaper.     She has stopped working since our last appointment due to symptoms.  She was previously on amitriptyline and topiramate.   She does continues to have a lot of neck soreness, especially under the right ear.   Had an episode of severe rocking vertigo after driving in a car.      Plan:  1. Taper methazolamide and titrate up topiramate  2. Trial of Emgality for intractable migraines  3. 1mg of diazepam for flying, avoid alcohol.  4. Neck protection during flying  5. Continue maintenance NUCCA   6. Add posterior neck strengthening exercises    Interim History 10/27/2023:  Is on topiramate 50mg BID, which is better than the methazolamide in terms of side effects of nausea and fatigue.   The Emgality was taken for 2 rounds, no change in headaches but also no side effects. The headaches are better lying down.   She is continuing to do NUCCA, with Dr. Peter, helps for about 2 days. Once every 3 weeks.     She takes a sumatriptan when the headache or dizziness are very severe. Needs a new prescription    The vertigo is worse looking down and with increase activity.  No globus sensation. No pain with swallowing.  She has a hiatal hernia and GERD.  No pelvic pressure, rectal pressure, hematuria, feet swelling.   There is always neck soreness and especially in the back of the neck  During a shower after doing a lot of active work on the house, both hands were tingling.     Plan:  CTV abdomen and pelvis  Patient wants to stay on topiramate 50mg BID  Sumatriptan 50mg tablets refilled, limit to two days per week.   MRI C-spine, patient has some  valium for the scan    DATA:  I personally reviewed the following data.    Last brain imaging:  MA Screen Bilateral w/Jeffery  BILATERAL FULL FIELD DIGITAL SCREENING MAMMOGRAM WITH TOMOSYNTHESIS    Performed on: 5/19/22    No comparisons were made when reading this study.    Technique:  This study was evaluated with the assistance of Computer-Aided   Detection.  Breast Tomosynthesis was used in interpretation.    Findings: The breasts are heterogeneously dense, which may obscure small   masses.  There is no radiographic evidence of malignancy.     IMPRESSION: ACR BI-RADS Category 1: Negative    RECOMMENDED FOLLOW-UP: Annual routine screening mammogram    The results and recommendations of this examination will be communicated   to the patient.    40-minutes were spent in evaluation, counseling, and documentation on the date of service.

## 2023-10-27 NOTE — NURSING NOTE
Is the patient currently in the state of [place]? YES    Visit mode:VIDEO    If the visit is dropped, the patient can be reconnected by: VIDEO VISIT: Text to cell phone:   Telephone Information:   Mobile [phone]   Mobile [phone]       Will anyone else be joining the visit? NO  (If patient encounters technical issues they should call [phone]    How would you like to obtain your AVS? MyChart    Are changes needed to the allergy or medication list? No, Pt stated no changes to allergies, and Pt stated no med changes    Reason for visit: STEPHANIE YAF

## 2023-10-27 NOTE — LETTER
"10/27/2023       RE: Oral Singh  [address] [place] [address]     Dear Colleague,    Thank you for referring your patient, Oral Singh, to the [facility] NEUROLOGY CLINIC [facility]. Please see a copy of my visit note below.    The patient is being evaluated via a billable video visit.    How would you like to obtain your AVS? Mail a copy  If the video visit is dropped, the invitation should be resent by: Send to e-mail at: [email or web address]  Will anyone else be joining your video visit? No        Follow-up 7-7-21, 10-8-21, 1-28-22, 5-27-22, 11-8-22, 4-13-23:  Oral Singh is a 58 year old female with PMH significant for left ear deafness with episodes of vertigo with now head triggered symptoms of lightheadedness and \"whooshing,\" sensations.  She also has chronic daily pressure headaches and neck pain.  She has chronic bilateral upper extremity weakness. There is some chronic swelling in the hands but also in the feet more recently.  Paresthesias in the hand and the headache have been worse on the right side by history but on exam today paresthesias are worse in the left hand.  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      She continues to feel that the arms are more tired than the legs. Dizziness is worse with flexion. Tinnitus is worse with head extension. Headache is worse with extension. There is no throat pressure. No problems swallowing.      Ultrasound on 4-13-21 showed elevated right internal jugular vein velocities compared to the left but no areas of obstruction. CT venogram on 7-19-21 showed elongated styloid processes at 3.0/3.1cm bilaterally      She still experiences pressure on the top of the head at least once a week that can last 12-24 hours. It is not " worse on one side. She is having the lightheadedness episodes throughout the day. She can trigger several a day triggered by fast head movements. They are lasting about 30-minutes to get back to baseline. This is worse with bending the head forward. She also notes a chronic cough since the beginning of the year. She coughs throughout the day. She may wake up in the middle of the night with coughing. There have been no voice changes. No pressure in the throat. No pain when swallowing.      A trial of topiramate escalated to 100mg BID did not help, so she is down to 50mg BID. Symptoms did not get worse when she came down. She has not had physical therapy.     We discussed the 2 imaging studies and the relevance that they may have to her current symptoms.  I think it be worthwhile given there conservative nature to try a course of physical therapy addressing the thoracic outlet and sternocleidomastoid area.  We would also consider escalating her diagnostic testing with a referral to my colleagues in physical medicine and rehab for a muscle block.  She also notes that her lightheadedness is significantly worse with head flexion.  Given enough time past since her last CT scan she is agreeable to try to CT venogram in the head flexed position which I have ordered.  This may help us with choosing all the correct muscles to inject if she does end up being referred for a muscle block.     Plan:  1. Physical therapy for thoracic outlet as well sternocleidomastoid area  2. CT venogram in the neutral and head flexed positions.   3. Consider trial muscle block with PM&R.      Interim History 1-28-22:  CT venogram 10-9-21: Styloid process measures 2.9 cm on the right.  Styloid process measures 3.5 cm on the left. Mild right and moderate left upper internal jugular venous narrowing with flexion.     She has had 8 sessions of PT through December 2021. She would better on the day of the session but she would revert to her baseline  sessions the next day. She didn't see any sustained improvement in the headache or dizziness but she does still do some neck stretches at home because the neck feels better.      She still has a chronic daily pressure headaches mostly in the front of the head but can be at the base of the skull. There have been no spinning very episodes since our last visit. But she continues to experience the lightheadedness mostly in the upright position, sitting and standing but better laying down.      There is still arm weakness but not so much the tingling.   We discussed the plan going forward to escalate to trying muscle blocks and Botox. She is agreeable with this plan.      Interim History 5-27-22:  5-9-22: PROCEDURE NOTE: Anterior Scalene Muscle and Sternocleidomastoid Muscle (SCM) Injection Under Ultrasound Guidance.  Before the procedure, she reported a pain score of 6/10.   After the procedure, she reported a pain score of 6/10.      She was fine in the room, but once she got out into the aranda-way she became very faint which lasted until she sat down. It lasted for about 5-minutes. She was able to drive home. For the rest of the day she felt better than her baseline. The lightheadedness/disorientation/pressure decreased it from 7/10 to 3/10 after two hours and it was 4/10 at 4 hours. She was back at her baseline by the evening. The next morning was the same as other morning. There was no neck tenderness. She is having arm tingling in the right once in a while, not too bad.      Was seen at the [facility] for question of CSF leak. CSF cisternogram from 1-18-21 was reviewed which was negative for a leak. She was a diagnosis of possible vestibular neuritis, vestibular migraine, and PPPD. Was referred to Jasmin Aceves in ENT with whom she has an appointment on 12-1-22.      She is still on topiramate 50mg BID. She is done with physical therapy. She is not taking sumatriptan usually, only for super  lightheadedness. Her symptoms are particularly bad with head flexion triggering faintness. Head extension is okay. Also, liying on the right side will trigger faintness. There is no globus sensation. No throat pressure.      Plan:  1. Request Botox chronic migraine (failed topiramate and amitriptyline).   2. If insufficiently helpful, would request vascular surgery evaluation for potential left styloid resection.      Interim History 9-23-22:  9-19-22: Bilateral occipital nerve blocks, trigger point injections.  Botox was not approved by insurance.      She has been very dizzy this summer. She has a lot of pressure in the ears, especially the left ear. She went to an ENT and had an MRI yesterday. She was given a diagnosis of vestibular migraine. She has been recommended to have trigger point injections which she had 4 days ago. This has not helped. She has tried a gluten free diet.     She has had really bad headaches in August over her son's wedding. This lasted about 5 days.      She has had no days with no head pressure. There is pressure behind the eyes. She is continuing to feel very off balanced and lightheaded. The lightheadedness is better sitting. When she stands, the lightheadedness is worse. Head movement will make her more lightheaded. Moving the head to the right is worse than moving to the left. Looking down is worse than looking up. Looking down and to the right is the worst. She is better is lying down. It makes the head pressure better. She has not fainted.      The arms and hands both feel weak. The fingers can get tingling. There is no color change. There is no swelling. There is no problem with washing hair.      She is taking topiramate 50mg BID. Of note she had a cisternogram at [place] on January 2021 that was negative and had a opening pressure of 13.5mg H20. The CSF was normal except for an elevated protein of 71.      Chronic daily migraines with intractable vestibular symptoms. Patient has  extracranial venous compression, perhaps as a  of these symptoms.      Plan:   1. 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 Consider referral to [place] for Fulda's syndrome after vascular referral  3. Methazolamide titrated to 50mg BID. When at goal dose start tapering the topiramate 25mg by each week.  4. Approval for Botox for chronic migraine with PM&R after clarification that he has failed 2.5 years of amitriptyline and 2.5 years of topiramate)  5. Follow-up in November 18th at 4pm for a video visit     INTERIM HISTORY 11/18/2022:  Botox had been denied by her insurance company despite a [id number] review and several requests. She had already failed over 2-years of topiramate and amitriptyline.      She was able to see Dr. Cristian Guadarrama on 11-9-22 for consideration of whether jugular vein compression could be contributing to her symptoms. She is scheduled for a venogram on 12-9-22. The appointment with [facility] on 12-1-22 has been cancelled.      Her headache and vertigo remained severe with head pressure every day and extreme sensitivity to head movement. There is continued bilateral ear pressure and tinnitus in the right ear because left ear is deaf. The tinnitus can change with bending over or with head movements.      Methazolamide titrated to 50mg BID went fine. She has been able to taper off the topiramate. She does feel more cognitively more foggy even without an increase in head pressure. There is neck tightness and pain symmetric. There is sometimes pain behind the right ear, where the styloid is.      She has no other major events. No falls. No numbness, tingling, or weakness in the hand.      PLAN:  1. Methazolamide 50mg BID tablets  2. Aware venogram for diagnosis of possible SCM syndrome     Interim History 4/14/2023:  12-9-22:  Venogram with Dr. Guadarrama: Normal     She still has daily dizziness and light headedness. She is seeing Duncan Peter for NUCCA treatment for  C1 adjustment. She feels better right after the adjustment but it lasts maybe 24 hrs at best. It does help with both the headache and the lightheadedness. She was doing weekly for 4 weeks but now she is doing every other week. She last did PT at the end of 2021.      She is on methazolamide 50mg BID is the same as topiramate as 50mg BID. Both had the same efficacy for the headache but topiramate is much cheaper.     She has stopped working since our last appointment due to symptoms.  She was previously on amitriptyline and topiramate.   She does continues to have a lot of neck soreness, especially under the right ear.   Had an episode of severe rocking vertigo after driving in a car.      Plan:  1. Taper methazolamide and titrate up topiramate  2. Trial of Emgality for intractable migraines  3. 1mg of diazepam for flying, avoid alcohol.  4. Neck protection during flying  5. Continue maintenance NUCCA   6. Add posterior neck strengthening exercises    Interim History 10/27/2023:  Is on topiramate 50mg BID, which is better than the methazolamide in terms of side effects of nausea and fatigue.   The Emgality was taken for 2 rounds, no change in headaches but also no side effects. The headaches are better lying down.   She is continuing to do NUCCA, with Dr. Peter, helps for about 2 days. Once every 3 weeks.     She takes a sumatriptan when the headache or dizziness are very severe. Needs a new prescription    The vertigo is worse looking down and with increase activity.  No globus sensation. No pain with swallowing.  She has a hiatal hernia and GERD.  No pelvic pressure, rectal pressure, hematuria, feet swelling.   There is always neck soreness and especially in the back of the neck  During a shower after doing a lot of active work on the house, both hands were tingling.     Plan:  CTV abdomen and pelvis  Patient wants to stay on topiramate 50mg BID  Sumatriptan 50mg tablets refilled, limit to two days per week.    MRI C-spine, patient has some valium for the scan    DATA:  I personally reviewed the following data.    Last brain imaging:  MA Screen Bilateral w/Jeffery  BILATERAL FULL FIELD DIGITAL SCREENING MAMMOGRAM WITH TOMOSYNTHESIS    Performed on: 5/19/22    No comparisons were made when reading this study.    Technique:  This study was evaluated with the assistance of Computer-Aided   Detection.  Breast Tomosynthesis was used in interpretation.    Findings: The breasts are heterogeneously dense, which may obscure small   masses.  There is no radiographic evidence of malignancy.     IMPRESSION: ACR BI-RADS Category 1: Negative    RECOMMENDED FOLLOW-UP: Annual routine screening mammogram    The results and recommendations of this examination will be communicated   to the patient.    40-minutes were spent in evaluation, counseling, and documentation on the date of service.          Again, thank you for allowing me to participate in the care of your patient.      Sincerely,    Toshia CAMARILLO Cha, MD

## 2023-11-01 ENCOUNTER — OFFICE VISIT (OUTPATIENT)
Dept: DERMATOLOGY | Facility: CLINIC | Age: 58
End: 2023-11-01
Attending: PHYSICIAN ASSISTANT
Payer: COMMERCIAL

## 2023-11-01 DIAGNOSIS — L40.9 PSORIASIS: ICD-10-CM

## 2023-11-01 DIAGNOSIS — N76.3 ZOON'S VULVITIS: Primary | ICD-10-CM

## 2023-11-01 PROCEDURE — 99203 OFFICE O/P NEW LOW 30 MIN: CPT | Performed by: DERMATOLOGY

## 2023-11-01 RX ORDER — TACROLIMUS 1 MG/G
OINTMENT TOPICAL
Qty: 60 G | Refills: 11 | Status: SHIPPED | OUTPATIENT
Start: 2023-11-01 | End: 2024-04-26

## 2023-11-01 ASSESSMENT — PAIN SCALES - GENERAL: PAINLEVEL: NO PAIN (0)

## 2023-11-01 NOTE — NURSING NOTE
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

## 2023-11-01 NOTE — PROGRESS NOTES
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    Flares tend to happen related to physical factors like heat and friction in the patient  Failed topical and IL steroids  Will trial tacrolimus 0.1% ointment daily with escalation to twice daily for flares for 6 weeks.  Apply cold from fridge to avoid burning  If not improved, consider barrier ointment like Zinc oxide and would do this next.      Follow-up: 12 weeks    Staff:     Quynh Ag MD  ____________________________________________    CC: Derm Problem (Plasma cell vulvitis- no changes)    HPI:  Ms. Oral Singh is a(n) 58 year old female who presents today as a new patient for biopsy proven Zoon's balanitis.  Painful condition present for over one year.  Factors like heat and friction cause flares.  Biopsy at [facility] (patient brought path results with her and are scanned into EPIC) 4/12/23.  Worked with other doctors trying several courses of clobetasol without improvement.  IL kenalog not helpful.  Tried antifungals.  Not using any products in this area.     Patient is otherwise feeling well, without additional skin concerns.     Labs Reviewed:  Path report reviewed    Physical Exam:  Vitals: There were no vitals taken for this visit.  SKIN: Focused examination of vulva was performed. Patient deferred nurse chaperone  - mild erythema of the labia majora with intense erythema of the right labia minora and vaginal introitus  - No other lesions of concern on areas examined.     Medications:  Current Outpatient Medications   Medication    COMBIPATCH 0.05-0.25 MG/DAY bi-weekly patch     diazepam (VALIUM) 2 MG tablet    DULoxetine (CYMBALTA) 30 MG capsule    estradiol (VAGIFEM) 10 MCG TABS vaginal tablet    fish oil-omega-3 fatty acids 1000 MG capsule    losartan (COZAAR) 25 MG tablet    Magnesium Glycinate 100 MG CAPS    SUMAtriptan (IMITREX) 50 MG tablet    topiramate (TOPAMAX) 50 MG tablet    traZODone (DESYREL) 50 MG tablet    vitamin B-Complex     Current Facility-Administered Medications   Medication    botulinum toxin type A (BOTOX) 100 units injection 100 Units    Botulinum Toxin Type A (BOTOX) 200 units injection 200 Units      Past Medical History:   Patient Active Problem List   Diagnosis    Asthma    Condyloma acuminatum    H/O arthroscopic knee surgery    Impingement syndrome of right shoulder    Insomnia, idiopathic    Major depressive disorder, single episode, mild (H24)    Migraine variant    Congenital mitral valve prolapse    Nonspecific immunological findings    Osteoarthritis of multiple joints    Vertigo     Past Medical History:   Diagnosis Date    Arthritis        CC Nora Paris MD  [facility] DERMATOLOGY  [address]  [place] [address] on close of this encounter.

## 2023-11-14 ENCOUNTER — ANCILLARY PROCEDURE (OUTPATIENT)
Dept: CT IMAGING | Facility: CLINIC | Age: 58
End: 2023-11-14
Attending: PSYCHIATRY & NEUROLOGY
Payer: COMMERCIAL

## 2023-11-14 ENCOUNTER — ANCILLARY PROCEDURE (OUTPATIENT)
Dept: MRI IMAGING | Facility: CLINIC | Age: 58
End: 2023-11-14
Attending: PSYCHIATRY & NEUROLOGY
Payer: COMMERCIAL

## 2023-11-14 DIAGNOSIS — I87.1 NUTCRACKER PHENOMENON OF RENAL VEIN: ICD-10-CM

## 2023-11-14 DIAGNOSIS — M48.02 SPINAL STENOSIS IN CERVICAL REGION: ICD-10-CM

## 2023-11-14 DIAGNOSIS — I87.1 COMPRESSION OF VEIN: ICD-10-CM

## 2023-11-14 DIAGNOSIS — I87.1 MAY-THURNER SYNDROME: ICD-10-CM

## 2023-11-14 PROCEDURE — 74174 CTA ABD&PLVS W/CONTRAST: CPT | Performed by: RADIOLOGY

## 2023-11-14 PROCEDURE — 72141 MRI NECK SPINE W/O DYE: CPT | Mod: GC | Performed by: RADIOLOGY

## 2023-11-14 RX ORDER — IOPAMIDOL 755 MG/ML
100 INJECTION, SOLUTION INTRAVASCULAR ONCE
Status: COMPLETED | OUTPATIENT
Start: 2023-11-14 | End: 2023-11-14

## 2023-11-14 RX ORDER — IOPAMIDOL 755 MG/ML
100 INJECTION, SOLUTION INTRAVASCULAR ONCE
OUTPATIENT
Start: 2023-11-14 | End: 2023-11-14

## 2023-11-14 RX ADMIN — IOPAMIDOL 100 ML: 755 INJECTION, SOLUTION INTRAVASCULAR at 16:23

## 2023-12-16 ENCOUNTER — HEALTH MAINTENANCE LETTER (OUTPATIENT)
Age: 58
End: 2023-12-16

## 2024-01-29 ENCOUNTER — OFFICE VISIT (OUTPATIENT)
Dept: DERMATOLOGY | Facility: CLINIC | Age: 59
End: 2024-01-29
Payer: COMMERCIAL

## 2024-01-29 DIAGNOSIS — L40.9 PSORIASIS: Primary | ICD-10-CM

## 2024-01-29 DIAGNOSIS — N76.3 ZOON'S VULVITIS: ICD-10-CM

## 2024-01-29 PROCEDURE — 99213 OFFICE O/P EST LOW 20 MIN: CPT | Mod: GC | Performed by: DERMATOLOGY

## 2024-01-29 RX ORDER — TACROLIMUS 1 MG/G
OINTMENT TOPICAL 2 TIMES DAILY
Qty: 60 G | Refills: 11 | Status: SHIPPED | OUTPATIENT
Start: 2024-01-29

## 2024-01-29 NOTE — NURSING NOTE
"Dermatology Rooming Note    Oral Singh's goals for this visit include:   Chief Complaint   Patient presents with    Derm Problem     Rin is here today for a follow up of zoons vulvitis. Reports things are \"a little better\"             "

## 2024-01-29 NOTE — LETTER
"1/29/2024       RE: Oral Singh  [address] [place] [address]     Dear Colleague,    Thank you for referring your patient, Oral Singh, to the [facility] DERMATOLOGY CLINIC [place] [facility]. Please see a copy of my visit note below.    [facility] Dermatology Note  Encounter Date: Jan 29, 2024  Office Visit     Dermatology Problem List:  # [facility]  - s/p antifungals, IL kenalog, clobetasol ointment without improvement    ____________________________________________    Assessment & Plan:   # Zoon's vulvitis  We reviewed this is an uncommon difficult to treat condition.    Flares tend to happen related to physical factors like heat and friction in the patient  Failed topical and IL steroids  Improved with topical tacrolimus 0.1% ointment, will recommend BID indefinitely  If not improved, consider barrier ointment like Zinc oxide vs others    Procedures Performed:   None    Follow-up: 4 months, prn for new or changing lesions    Staff and Resident: Kimberli Varela MD  PGY-4 Dermatology  Pager: [phone]  I, Quynh Ag MD, saw this patient with the resident and agree with the resident s findings and plan of care as documented in the resident s note.    ____________________________________________    CC: Derm Problem (Rin is here today for a follow up of zoons vulvitis. Reports things are \"a little better\")    HPI:  Ms. Oral Singh is a(n) 58 year old female who presents today as a return patient for zoons vulvitis    - overall things improved with 6 week course of tacrolimus with sustained improvement however still has mild activity, no erosions or non healing areas or growths, no rash elsewhere, difficult to apply ointment due to ointment consistency, otherwise feeling well    - Patient is otherwise feeling well, without additional skin " concerns.    Labs Reviewed:  N/A    Physical Exam:  Vitals: There were no vitals taken for this visit.  SKIN: Focused examination of  was performed with Dr. Ag present.  - Bright red erythema on the upper labial minor near superior vaginal introitus   - No other lesions of concern on areas examined.     Medications:  Current Outpatient Medications   Medication    COMBIPATCH 0.05-0.25 MG/DAY bi-weekly patch    diazepam (VALIUM) 2 MG tablet    DULoxetine (CYMBALTA) 30 MG capsule    estradiol (VAGIFEM) 10 MCG TABS vaginal tablet    fish oil-omega-3 fatty acids 1000 MG capsule    losartan (COZAAR) 25 MG tablet    Magnesium Glycinate 100 MG CAPS    SUMAtriptan (IMITREX) 50 MG tablet    tacrolimus (PROTOPIC) 0.1 % external ointment    topiramate (TOPAMAX) 50 MG tablet    traZODone (DESYREL) 50 MG tablet    vitamin B-Complex    tacrolimus (PROTOPIC) 0.1 % external ointment     Current Facility-Administered Medications   Medication    botulinum toxin type A (BOTOX) 100 units injection 100 Units    Botulinum Toxin Type A (BOTOX) 200 units injection 200 Units      Past Medical History:   Patient Active Problem List   Diagnosis    Asthma    Condyloma acuminatum    H/O arthroscopic knee surgery    Impingement syndrome of right shoulder    Insomnia, idiopathic    Major depressive disorder, single episode, mild (H24)    Migraine variant    Congenital mitral valve prolapse    Nonspecific immunological findings    Osteoarthritis of multiple joints    Vertigo     Past Medical History:   Diagnosis Date    Arthritis        CC No referring provider defined for this encounter. on close of this encounter.

## 2024-01-29 NOTE — PROGRESS NOTES
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    Flares tend to happen related to physical factors like heat and friction in the patient  Failed topical and IL steroids  Improved with topical tacrolimus 0.1% ointment, will recommend BID indefinitely  If not improved, consider barrier ointment like Zinc oxide vs others    Procedures Performed:   None    Follow-up: 4 months, prn for new or changing lesions    Staff and Resident: Kimberli Varela MD  PGY-4 Dermatology  Pager: [phone]  I, Quynh Ag MD, saw this patient with the resident and agree with the resident s findings and plan of care as documented in the resident s note.    ____________________________________________    CC: Derm Problem (Rin is here today for a follow up of zoons vulvitis. Reports things are \"a little better\")    HPI:  Ms. Oral Singh is a(n) 58 year old female who presents today as a return patient for zoons vulvitis    - overall things improved with 6 week course of tacrolimus with sustained improvement however still has mild activity, no erosions or non healing areas or growths, no rash elsewhere, difficult to apply ointment due to ointment consistency, otherwise feeling well    - Patient is otherwise feeling well, without additional skin concerns.    Labs Reviewed:  N/A    Physical Exam:  Vitals: There were no vitals taken for this visit.  SKIN: Focused examination of  was performed with Dr. Ag present.  - Bright red erythema on the upper labial minor near superior vaginal introitus   - No other lesions of concern on areas examined. "     Medications:  Current Outpatient Medications   Medication    COMBIPATCH 0.05-0.25 MG/DAY bi-weekly patch    diazepam (VALIUM) 2 MG tablet    DULoxetine (CYMBALTA) 30 MG capsule    estradiol (VAGIFEM) 10 MCG TABS vaginal tablet    fish oil-omega-3 fatty acids 1000 MG capsule    losartan (COZAAR) 25 MG tablet    Magnesium Glycinate 100 MG CAPS    SUMAtriptan (IMITREX) 50 MG tablet    tacrolimus (PROTOPIC) 0.1 % external ointment    topiramate (TOPAMAX) 50 MG tablet    traZODone (DESYREL) 50 MG tablet    vitamin B-Complex    tacrolimus (PROTOPIC) 0.1 % external ointment     Current Facility-Administered Medications   Medication    botulinum toxin type A (BOTOX) 100 units injection 100 Units    Botulinum Toxin Type A (BOTOX) 200 units injection 200 Units      Past Medical History:   Patient Active Problem List   Diagnosis    Asthma    Condyloma acuminatum    H/O arthroscopic knee surgery    Impingement syndrome of right shoulder    Insomnia, idiopathic    Major depressive disorder, single episode, mild (H24)    Migraine variant    Congenital mitral valve prolapse    Nonspecific immunological findings    Osteoarthritis of multiple joints    Vertigo     Past Medical History:   Diagnosis Date    Arthritis        CC No referring provider defined for this encounter. on close of this encounter.

## 2024-03-23 NOTE — PROGRESS NOTES
"The patient is being evaluated via a billable video visit.    How would you like to obtain your AVS? Mail a copy  If the video visit is dropped, the invitation should be resent by: Send to e-mail at: [email or web address]  Will anyone else be joining your video visit? No      Video-Visit Details  Type of service:  Video Visit  Video Start Time:2:00 PM  Video End Time:2:24 PM  Originating Location (pt. Location): Home  Distant Location (provider location):  [facility] NEUROLOGY 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  She also has chronic daily pressure headaches and neck pain.  She has chronic bilateral upper extremity weakness. There is some chronic swelling in the hands but also in the feet more recently.  Paresthesias in the hand and the headache have been worse on the right side by history but on exam today paresthesias are worse in the left hand.  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      She continues to feel that the arms are more tired than the legs. Dizziness is worse with flexion. Tinnitus is worse with head extension. Headache is worse with extension. There is no throat pressure. No problems swallowing.      Ultrasound on 4-13-21 showed elevated right internal jugular vein velocities compared to the left but no areas of obstruction. CT venogram on 7-19-21 showed elongated styloid processes at 3.0/3.1cm bilaterally      She still experiences pressure on the top of the head at least once a week that can last 12-24 hours. It is not worse on one side. She is having the " lightheadedness episodes throughout the day. She can trigger several a day triggered by fast head movements. They are lasting about 30-minutes to get back to baseline. This is worse with bending the head forward. She also notes a chronic cough since the beginning of the year. She coughs throughout the day. She may wake up in the middle of the night with coughing. There have been no voice changes. No pressure in the throat. No pain when swallowing.      A trial of topiramate escalated to 100mg BID did not help, so she is down to 50mg BID. Symptoms did not get worse when she came down. She has not had physical therapy.     We discussed the 2 imaging studies and the relevance that they may have to her current symptoms.  I think it be worthwhile given there conservative nature to try a course of physical therapy addressing the thoracic outlet and sternocleidomastoid area.  We would also consider escalating her diagnostic testing with a referral to my colleagues in physical medicine and rehab for a muscle block.  She also notes that her lightheadedness is significantly worse with head flexion.  Given enough time past since her last CT scan she is agreeable to try to CT venogram in the head flexed position which I have ordered.  This may help us with choosing all the correct muscles to inject if she does end up being referred for a muscle block.     Plan:  1. Physical therapy for thoracic outlet as well sternocleidomastoid area  2. CT venogram in the neutral and head flexed positions.   3. Consider trial muscle block with PM&R.      Interim History 1-28-22:  CT venogram 10-9-21: Styloid process measures 2.9 cm on the right.  Styloid process measures 3.5 cm on the left. Mild right and moderate left upper internal jugular venous narrowing with flexion.     She has had 8 sessions of PT through December 2021. She would better on the day of the session but she would revert to her baseline sessions the next day. She didn't see  any sustained improvement in the headache or dizziness but she does still do some neck stretches at home because the neck feels better.      She still has a chronic daily pressure headaches mostly in the front of the head but can be at the base of the skull. There have been no spinning very episodes since our last visit. But she continues to experience the lightheadedness mostly in the upright position, sitting and standing but better laying down.      There is still arm weakness but not so much the tingling.   We discussed the plan going forward to escalate to trying muscle blocks and Botox. She is agreeable with this plan.      Interim History 5-27-22:  5-9-22: PROCEDURE NOTE: Anterior Scalene Muscle and Sternocleidomastoid Muscle (SCM) Injection Under Ultrasound Guidance.  Before the procedure, she reported a pain score of 6/10.   After the procedure, she reported a pain score of 6/10.      She was fine in the room, but once she got out into the aranda-way she became very faint which lasted until she sat down. It lasted for about 5-minutes. She was able to drive home. For the rest of the day she felt better than her baseline. The lightheadedness/disorientation/pressure decreased it from 7/10 to 3/10 after two hours and it was 4/10 at 4 hours. She was back at her baseline by the evening. The next morning was the same as other morning. There was no neck tenderness. She is having arm tingling in the right once in a while, not too bad.      Was seen at the [facility] for question of CSF leak. CSF cisternogram from 1-18-21 was reviewed which was negative for a leak. She was a diagnosis of possible vestibular neuritis, vestibular migraine, and PPPD. Was referred to Jasmin Aceves in ENT with whom she has an appointment on 12-1-22.      She is still on topiramate 50mg BID. She is done with physical therapy. She is not taking sumatriptan usually, only for super lightheadedness. Her symptoms are  particularly bad with head flexion triggering faintness. Head extension is okay. Also, liying on the right side will trigger faintness. There is no globus sensation. No throat pressure.      Plan:  1. Request Botox chronic migraine (failed topiramate and amitriptyline).   2. If insufficiently helpful, would request vascular surgery evaluation for potential left styloid resection.      Interim History 9-23-22:  9-19-22: Bilateral occipital nerve blocks, trigger point injections.  Botox was not approved by insurance.      She has been very dizzy this summer. She has a lot of pressure in the ears, especially the left ear. She went to an ENT and had an MRI yesterday. She was given a diagnosis of vestibular migraine. She has been recommended to have trigger point injections which she had 4 days ago. This has not helped. She has tried a gluten free diet.     She has had really bad headaches in August over her son's wedding. This lasted about 5 days.      She has had no days with no head pressure. There is pressure behind the eyes. She is continuing to feel very off balanced and lightheaded. The lightheadedness is better sitting. When she stands, the lightheadedness is worse. Head movement will make her more lightheaded. Moving the head to the right is worse than moving to the left. Looking down is worse than looking up. Looking down and to the right is the worst. She is better is lying down. It makes the head pressure better. She has not fainted.      The arms and hands both feel weak. The fingers can get tingling. There is no color change. There is no swelling. There is no problem with washing hair.      She is taking topiramate 50mg BID. Of note she had a cisternogram at [place] on January 2021 that was negative and had a opening pressure of 13.5mg H20. The CSF was normal except for an elevated protein of 71.      Chronic daily migraines with intractable vestibular symptoms. Patient has extracranial venous compression,  perhaps as a  of these symptoms.      Plan:   1. 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 Methazolamide titrated to 50mg BID. When at goal dose start tapering the topiramate 25mg by each week.  4. Approval for Botox for chronic migraine with PM&R after clarification that he has failed 2.5 years of amitriptyline and 2.5 years of topiramate)  5. Follow-up in November 18th at 4pm for a video visit     INTERIM HISTORY 11/18/2022:  Botox had been denied by her insurance company despite a [id number] review and several requests. She had already failed over 2-years of topiramate and amitriptyline.      She was able to see Dr. Cristian Guadarrama on 11-9-22 for consideration of whether jugular vein compression could be contributing to her symptoms. She is scheduled for a venogram on 12-9-22. The appointment with [facility] on 12-1-22 has been cancelled.      Her headache and vertigo remained severe with head pressure every day and extreme sensitivity to head movement. There is continued bilateral ear pressure and tinnitus in the right ear because left ear is deaf. The tinnitus can change with bending over or with head movements.      Methazolamide titrated to 50mg BID went fine. She has been able to taper off the topiramate. She does feel more cognitively more foggy even without an increase in head pressure. There is neck tightness and pain symmetric. There is sometimes pain behind the right ear, where the styloid is.      She has no other major events. No falls. No numbness, tingling, or weakness in the hand.      PLAN:  1. Methazolamide 50mg BID tablets  2. Aware venogram for diagnosis of possible SCM syndrome     Interim History 4/14/2023:  12-9-22:  Venogram with Dr. Guadarrama: Normal     She still has daily dizziness and light headedness. She is seeing Duncan Peter for NUCCA treatment for C1 adjustment. She feels better  right after the adjustment but it lasts maybe 24 hrs at best. It does help with both the headache and the lightheadedness. She was doing weekly for 4 weeks but now she is doing every other week. She last did PT at the end of 2021.      She is on methazolamide 50mg BID is the same as topiramate as 50mg BID. Both had the same efficacy for the headache but topiramate is much cheaper.     She has stopped working since our last appointment due to symptoms.  She was previously on amitriptyline and topiramate.   She does continues to have a lot of neck soreness, especially under the right ear.   Had an episode of severe rocking vertigo after driving in a car.      Plan:  1. Taper methazolamide and titrate up topiramate  2. Trial of Emgality for intractable migraines  3. 1mg of diazepam for flying, avoid alcohol.  4. Neck protection during flying  5. Continue maintenance NUCCA   6. Add posterior neck strengthening exercises     Interim History 10/27/2023:  Is on topiramate 50mg BID, which is better than the methazolamide in terms of side effects of nausea and fatigue.   The Emgality was taken for 2 rounds, no change in headaches but also no side effects. The headaches are better lying down.   She is continuing to do NUCCA, with Dr. Peter, helps for about 2 days. Once every 3 weeks.      She takes a sumatriptan when the headache or dizziness are very severe. Needs a new prescription     The vertigo is worse looking down and with increase activity.  No globus sensation. No pain with swallowing.  She has a hiatal hernia and GERD.    No pelvic pressure, rectal pressure, hematuria, feet swelling.   There is always neck soreness and especially in the back of the neck  During a shower after doing a lot of active work on the house, both hands were tingling.      Plan:  CTV abdomen and pelvis  Patient wants to stay on topiramate 50mg BID  Sumatriptan 50mg tablets refilled, limit to two days per week.   MRI C-spine, patient has  some valium for the scan     Interim History 3/29/2024:    CTA/CTV ABDOMEN AND PELVIS 11/14/2023   IMPRESSION:  1. 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  2. May-Thurner anatomy not demonstrated.  3. Enlarged left uterine and ovarian veins. Correlate for pelvic  congestion symptoms.    MR CERVICAL SPINE W/O CONTRAST 11/14/2023  Impression:   Cervical spondylosis greatest at C5-C6 with moderate to severe right  and moderate left neural foraminal stenosis and mild spinal canal  stenosis.    Still topiramate 50mg BID.  Feels that the headache is the same overall, head pressure mostly. This is worse with activity, and bending over. There are no pelvic congestion. She is postmenopausal.   She is having the tinnitus and dizziness quite often, with activity or with the head down. When severe, she is at a 8/10. On the best days, she's a 2/10.  She is still going to NUCCA, q3 weeks, going longer makes the symptoms come back. She feels that this is very effective.     She continues to have neck stiffness.   1-7-21 had LP at [place] with OP 13.5cmH20.    Exam: Not able to rotate head all the way to 90 degrees. Gets to about 70 degrees. Has a forward head posture. The left shoulder is lower than the right. Chin is slightly turned to the left.     PLAN:  Continue topiramate 50mg BID  Continue NUCCA   Consider doing a pelvic venogram for the possibility of pelvic congestion syndrome---referral placed  Schedule for neurotoxin injection for cervical dystonia    DATA:  I personally reviewed the following data.  MR CERVICAL SPINE W/O CONTRAST 11/14/2023     Provided History: 58F with chronic neck pain, bilateral hand  paresthesias.; Spinal stenosis in cervical region  ICD-10: Spinal stenosis in cervical region    Comparison: None    Technique: Sagittal T1-weighted, sagittal T2-weighted, sagittal STIR,  sagittal gradient echo, sagittal diffusion-weighted (with ADC map),  axial T2-weighted, and axial T2* gradient echo images  of the cervical  spine were obtained without intravenous contrast.    Findings:  The cervical vertebrae are normally aligned. Multilevel intervertebral  disc space narrowing greatest from C5-C7.  There is normal signal  within and normal contour of the cervical spinal cord.  T1 and T2  hyperintense foci scattered throughout the cervical vertebral bodies,  prominent at C2 C6 and C7, likely representing a benign process such  as cavernous hemangiomas, focal fatty deposition and degenerative  endplate changes. The findings on a level by level basis are as  follows:    C2-3:  No spinal canal or neural foraminal stenosis.    C3-4:  No spinal canal or neural foraminal stenosis.    C4-5:  No spinal canal or neural foraminal stenosis.    C5-6:  Posterior disc osteophyte complex and bilateral uncinate facet  hypertrophy. Moderate to severe right and moderate neural foraminal  stenoses. Mild spinal canal stenosis.    C6-7:  Posterior disc osteophyte complex and bilateral uncinate facet  hypertrophy. Mild to moderate bilateral neural foraminal stenosis. No  significant spinal canal stenosis.    C7-T1:  No spinal canal or neural foraminal stenosis.     No abnormality of the paraspinous soft tissues.  Impression:   Cervical spondylosis greatest at C5-C6 with moderate to severe right  and moderate left neural foraminal stenosis and mild spinal canal  stenosis.    CTA/CTV ABDOMEN AND PELVIS 11/14/2023   CLINICAL HISTORY: 58F with head pressure, vertigo bending forward.  Question venous compression. Nutcracker, May-Thurner.; Compression of  vein; Nutcracker phenomenon of renal vein; May-Thurner syndrome.       DOSE (DLP): 1070 mGy*cm     FINDINGS: CTA/CTV: Left renal vein measures 6 mm in diameter between  the superior mesenteric artery and aorta and 9 mm in diameter  laterally = 1.5 compression ratio.     Left common iliac vein measures 10 mm in diameter between the right  common iliac artery and spine and 14 mm in diameter  lateral to the  spine = 29% diameter compression.     Left uterine and ovarian vein enlargement.     Patent aorta. Celiac, superior mesenteric, single right and two left  renal, and inferior mesenteric arteries patent. Replaced right hepatic  artery originates from the superior mesenteric artery. Bilateral  common, internal, and external iliac arteries patent. Bilateral corona  mortis. Bilateral common femoral arteries patent.     Bilateral common femoral veins patent. Bilateral common, internal, and  external iliac veins patent. Inferior vena cava patent. Renal and  hepatic veins patent.     Splenic, superior mesenteric, and portal veins patent.     CT: Lung bases clear.                                                                   IMPRESSION:  1. 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  2. May-Thurner anatomy not demonstrated.  3. Enlarged left uterine and ovarian veins. Correlate for pelvic  congestion symptoms.    23-minutes were spent in evaluation, counseling, and documentation on the date of service.

## 2024-03-29 ENCOUNTER — VIRTUAL VISIT (OUTPATIENT)
Dept: NEUROLOGY | Facility: CLINIC | Age: 59
End: 2024-03-29
Payer: COMMERCIAL

## 2024-03-29 VITALS — HEIGHT: 65 IN | BODY MASS INDEX: 24.99 KG/M2 | WEIGHT: 150 LBS

## 2024-03-29 DIAGNOSIS — I87.1 COMPRESSION OF VEIN: ICD-10-CM

## 2024-03-29 DIAGNOSIS — G24.3 CERVICAL DYSTONIA: Primary | ICD-10-CM

## 2024-03-29 DIAGNOSIS — N94.89 PELVIC CONGESTION: ICD-10-CM

## 2024-03-29 DIAGNOSIS — R42 VERTIGO: ICD-10-CM

## 2024-03-29 DIAGNOSIS — G43.719 INTRACTABLE CHRONIC MIGRAINE WITHOUT AURA AND WITHOUT STATUS MIGRAINOSUS: ICD-10-CM

## 2024-03-29 PROCEDURE — 99213 OFFICE O/P EST LOW 20 MIN: CPT | Mod: 95 | Performed by: PSYCHIATRY & NEUROLOGY

## 2024-03-29 ASSESSMENT — PAIN SCALES - GENERAL: PAINLEVEL: MILD PAIN (3)

## 2024-03-29 NOTE — LETTER
"3/29/2024       RE: Oral Singh  [address] [place] [address]     Dear Colleague,    Thank you for referring your patient, Oral Singh, to the [facility]UROLOG[facility] [place] [facility]. Please see a copy of my visit note below.    The patient is being evaluated via a billable video visit.    How would you like to obtain your AVS? Mail a copy  If the video visit is dropped, the invitation should be resent by: Send to e-mail at: [email or web address]  Will anyone else be joining your video visit? No      Video-Visit Details  Type of service:  Video Visit  Video Start Time:2:00 PM  Video End Time:2:24 PM  Originating Location (pt. 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  She also has chronic daily pressure headaches and neck pain.  She has chronic bilateral upper extremity weakness. There is some chronic swelling in the hands but also in the feet more recently.  Paresthesias in the hand and the headache have been worse on the right side by history but on exam today paresthesias are worse in the left hand.  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      She continues to feel that the arms are more tired than the legs. Dizziness is worse with flexion. Tinnitus is worse with head extension. Headache is worse with extension. There is no throat pressure. No problems swallowing.      Ultrasound on 4-13-21 showed elevated right " internal jugular vein velocities compared to the left but no areas of obstruction. CT venogram on 7-19-21 showed elongated styloid processes at 3.0/3.1cm bilaterally      She still experiences pressure on the top of the head at least once a week that can last 12-24 hours. It is not worse on one side. She is having the lightheadedness episodes throughout the day. She can trigger several a day triggered by fast head movements. They are lasting about 30-minutes to get back to baseline. This is worse with bending the head forward. She also notes a chronic cough since the beginning of the year. She coughs throughout the day. She may wake up in the middle of the night with coughing. There have been no voice changes. No pressure in the throat. No pain when swallowing.      A trial of topiramate escalated to 100mg BID did not help, so she is down to 50mg BID. Symptoms did not get worse when she came down. She has not had physical therapy.     We discussed the 2 imaging studies and the relevance that they may have to her current symptoms.  I think it be worthwhile given there conservative nature to try a course of physical therapy addressing the thoracic outlet and sternocleidomastoid area.  We would also consider escalating her diagnostic testing with a referral to my colleagues in physical medicine and rehab for a muscle block.  She also notes that her lightheadedness is significantly worse with head flexion.  Given enough time past since her last CT scan she is agreeable to try to CT venogram in the head flexed position which I have ordered.  This may help us with choosing all the correct muscles to inject if she does end up being referred for a muscle block.     Plan:  1. Physical therapy for thoracic outlet as well sternocleidomastoid area  2. CT venogram in the neutral and head flexed positions.   3. Consider trial muscle block with PM&R.      Interim History 1-28-22:  CT venogram 10-9-21: Styloid process measures 2.9  cm on the right.  Styloid process measures 3.5 cm on the left. Mild right and moderate left upper internal jugular venous narrowing with flexion.     She has had 8 sessions of PT through December 2021. She would better on the day of the session but she would revert to her baseline sessions the next day. She didn't see any sustained improvement in the headache or dizziness but she does still do some neck stretches at home because the neck feels better.      She still has a chronic daily pressure headaches mostly in the front of the head but can be at the base of the skull. There have been no spinning very episodes since our last visit. But she continues to experience the lightheadedness mostly in the upright position, sitting and standing but better laying down.      There is still arm weakness but not so much the tingling.   We discussed the plan going forward to escalate to trying muscle blocks and Botox. She is agreeable with this plan.      Interim History 5-27-22:  5-9-22: PROCEDURE NOTE: Anterior Scalene Muscle and Sternocleidomastoid Muscle (SCM) Injection Under Ultrasound Guidance.  Before the procedure, she reported a pain score of 6/10.   After the procedure, she reported a pain score of 6/10.      She was fine in the room, but once she got out into the aranda-way she became very faint which lasted until she sat down. It lasted for about 5-minutes. She was able to drive home. For the rest of the day she felt better than her baseline. The lightheadedness/disorientation/pressure decreased it from 7/10 to 3/10 after two hours and it was 4/10 at 4 hours. She was back at her baseline by the evening. The next morning was the same as other morning. There was no neck tenderness. She is having arm tingling in the right once in a while, not too bad.      Was seen at the [facility] for question of CSF leak. CSF cisternogram from 1-18-21 was reviewed which was negative for a leak. She was a diagnosis of  possible vestibular neuritis, vestibular migraine, and PPPD. Was referred to Jasmin Aceves in ENT with whom she has an appointment on 12-1-22.      She is still on topiramate 50mg BID. She is done with physical therapy. She is not taking sumatriptan usually, only for super lightheadedness. Her symptoms are particularly bad with head flexion triggering faintness. Head extension is okay. Also, liying on the right side will trigger faintness. There is no globus sensation. No throat pressure.      Plan:  1. Request Botox chronic migraine (failed topiramate and amitriptyline).   2. If insufficiently helpful, would request vascular surgery evaluation for potential left styloid resection.      Interim History 9-23-22:  9-19-22: Bilateral occipital nerve blocks, trigger point injections.  Botox was not approved by insurance.      She has been very dizzy this summer. She has a lot of pressure in the ears, especially the left ear. She went to an ENT and had an MRI yesterday. She was given a diagnosis of vestibular migraine. She has been recommended to have trigger point injections which she had 4 days ago. This has not helped. She has tried a gluten free diet.     She has had really bad headaches in August over her son's wedding. This lasted about 5 days.      She has had no days with no head pressure. There is pressure behind the eyes. She is continuing to feel very off balanced and lightheaded. The lightheadedness is better sitting. When she stands, the lightheadedness is worse. Head movement will make her more lightheaded. Moving the head to the right is worse than moving to the left. Looking down is worse than looking up. Looking down and to the right is the worst. She is better is lying down. It makes the head pressure better. She has not fainted.      The arms and hands both feel weak. The fingers can get tingling. There is no color change. There is no swelling. There is no problem with washing hair.      She is  taking topiramate 50mg BID. Of note she had a cisternogram at [facility] on January 2021 that was negative and had a opening pressure of 13.5mg H20. The CSF was normal except for an elevated protein of 71.      Chronic daily migraines with intractable vestibular symptoms. Patient has extracranial venous compression, perhaps as a  of these symptoms.      Plan:   1. 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 Methazolamide titrated to 50mg BID. When at goal dose start tapering the topiramate 25mg by each week.  4. Approval for Botox for chronic migraine with PM&R after clarification that he has failed 2.5 years of amitriptyline and 2.5 years of topiramate)  5. Follow-up in November 18th at 4pm for a video visit     INTERIM HISTORY 11/18/2022:  Botox had been denied by her insurance company despite a [id number] review and several requests. She had already failed over 2-years of topiramate and amitriptyline.      She was able to see Dr. Cristian Guadarrama on 11-9-22 for consideration of whether jugular vein compression could be contributing to her symptoms. She is scheduled for a venogram on 12-9-22. The appointment with [facility] on 12-1-22 has been cancelled.      Her headache and vertigo remained severe with head pressure every day and extreme sensitivity to head movement. There is continued bilateral ear pressure and tinnitus in the right ear because left ear is deaf. The tinnitus can change with bending over or with head movements.      Methazolamide titrated to 50mg BID went fine. She has been able to taper off the topiramate. She does feel more cognitively more foggy even without an increase in head pressure. There is neck tightness and pain symmetric. There is sometimes pain behind the right ear, where the styloid is.      She has no other major events. No falls. No numbness, tingling, or weakness in the hand.       PLAN:  1. Methazolamide 50mg BID tablets  2. Aware venogram for diagnosis of possible SCM syndrome     Interim History 4/14/2023:  12-9-22:  Venogram with Dr. Guadarrama: Normal     She still has daily dizziness and light headedness. She is seeing Duncan Peter for NUCCA treatment for C1 adjustment. She feels better right after the adjustment but it lasts maybe 24 hrs at best. It does help with both the headache and the lightheadedness. She was doing weekly for 4 weeks but now she is doing every other week. She last did PT at the end of 2021.      She is on methazolamide 50mg BID is the same as topiramate as 50mg BID. Both had the same efficacy for the headache but topiramate is much cheaper.     She has stopped working since our last appointment due to symptoms.  She was previously on amitriptyline and topiramate.   She does continues to have a lot of neck soreness, especially under the right ear.   Had an episode of severe rocking vertigo after driving in a car.      Plan:  1. Taper methazolamide and titrate up topiramate  2. Trial of Emgality for intractable migraines  3. 1mg of diazepam for flying, avoid alcohol.  4. Neck protection during flying  5. Continue maintenance NUCCA   6. Add posterior neck strengthening exercises     Interim History 10/27/2023:  Is on topiramate 50mg BID, which is better than the methazolamide in terms of side effects of nausea and fatigue.   The Emgality was taken for 2 rounds, no change in headaches but also no side effects. The headaches are better lying down.   She is continuing to do NUCCA, with Dr. Peter, helps for about 2 days. Once every 3 weeks.      She takes a sumatriptan when the headache or dizziness are very severe. Needs a new prescription     The vertigo is worse looking down and with increase activity.  No globus sensation. No pain with swallowing.  She has a hiatal hernia and GERD.    No pelvic pressure, rectal pressure, hematuria, feet swelling.   There is always  neck soreness and especially in the back of the neck  During a shower after doing a lot of active work on the house, both hands were tingling.      Plan:  CTV abdomen and pelvis  Patient wants to stay on topiramate 50mg BID  Sumatriptan 50mg tablets refilled, limit to two days per week.   MRI C-spine, patient has some valium for the scan     Interim History 3/29/2024:    CTA/CTV ABDOMEN AND PELVIS 11/14/2023   IMPRESSION:  1. 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  2. May-Thurner anatomy not demonstrated.  3. Enlarged left uterine and ovarian veins. Correlate for pelvic  congestion symptoms.    MR CERVICAL SPINE W/O CONTRAST 11/14/2023  Impression:   Cervical spondylosis greatest at C5-C6 with moderate to severe right  and moderate left neural foraminal stenosis and mild spinal canal  stenosis.    Still topiramate 50mg BID.  Feels that the headache is the same overall, head pressure mostly. This is worse with activity, and bending over. There are no pelvic congestion. She is postmenopausal.   She is having the tinnitus and dizziness quite often, with activity or with the head down. When severe, she is at a 8/10. On the best days, she's a 2/10.  She is still going to NUCCA, q3 weeks, going longer makes the symptoms come back. She feels that this is very effective.     She continues to have neck stiffness.   1-7-21 had LP at [place] with OP 13.5cmH20.    Exam: Not able to rotate head all the way to 90 degrees. Gets to about 70 degrees. Has a forward head posture. The left shoulder is lower than the right. Chin is slightly turned to the left.     PLAN:  Continue topiramate 50mg BID  Continue NUCCA   Consider doing a pelvic venogram for the possibility of pelvic congestion syndrome---referral placed  Schedule for neurotoxin injection for cervical dystonia    DATA:  I personally reviewed the following data.  MR CERVICAL SPINE W/O CONTRAST 11/14/2023     Provided History: 58F with chronic neck pain, bilateral  hand  paresthesias.; Spinal stenosis in cervical region  ICD-10: Spinal stenosis in cervical region    Comparison: None    Technique: Sagittal T1-weighted, sagittal T2-weighted, sagittal STIR,  sagittal gradient echo, sagittal diffusion-weighted (with ADC map),  axial T2-weighted, and axial T2* gradient echo images of the cervical  spine were obtained without intravenous contrast.    Findings:  The cervical vertebrae are normally aligned. Multilevel intervertebral  disc space narrowing greatest from C5-C7.  There is normal signal  within and normal contour of the cervical spinal cord.  T1 and T2  hyperintense foci scattered throughout the cervical vertebral bodies,  prominent at C2 C6 and C7, likely representing a benign process such  as cavernous hemangiomas, focal fatty deposition and degenerative  endplate changes. The findings on a level by level basis are as  follows:    C2-3:  No spinal canal or neural foraminal stenosis.    C3-4:  No spinal canal or neural foraminal stenosis.    C4-5:  No spinal canal or neural foraminal stenosis.    C5-6:  Posterior disc osteophyte complex and bilateral uncinate facet  hypertrophy. Moderate to severe right and moderate neural foraminal  stenoses. Mild spinal canal stenosis.    C6-7:  Posterior disc osteophyte complex and bilateral uncinate facet  hypertrophy. Mild to moderate bilateral neural foraminal stenosis. No  significant spinal canal stenosis.    C7-T1:  No spinal canal or neural foraminal stenosis.     No abnormality of the paraspinous soft tissues.  Impression:   Cervical spondylosis greatest at C5-C6 with moderate to severe right  and moderate left neural foraminal stenosis and mild spinal canal  stenosis.    CTA/CTV ABDOMEN AND PELVIS 11/14/2023   CLINICAL HISTORY: 58F with head pressure, vertigo bending forward.  Question venous compression. Nutcracker, May-Thurner.; Compression of  vein; Nutcracker phenomenon of renal vein; May-Thurner syndrome.       DOSE  (DLP): 1070 mGy*cm     FINDINGS: CTA/CTV: Left renal vein measures 6 mm in diameter between  the superior mesenteric artery and aorta and 9 mm in diameter  laterally = 1.5 compression ratio.     Left common iliac vein measures 10 mm in diameter between the right  common iliac artery and spine and 14 mm in diameter lateral to the  spine = 29% diameter compression.     Left uterine and ovarian vein enlargement.     Patent aorta. Celiac, superior mesenteric, single right and two left  renal, and inferior mesenteric arteries patent. Replaced right hepatic  artery originates from the superior mesenteric artery. Bilateral  common, internal, and external iliac arteries patent. Bilateral corona  mortis. Bilateral common femoral arteries patent.     Bilateral common femoral veins patent. Bilateral common, internal, and  external iliac veins patent. Inferior vena cava patent. Renal and  hepatic veins patent.     Splenic, superior mesenteric, and portal veins patent.     CT: Lung bases clear.                                                                   IMPRESSION:  1. 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  2. May-Thurner anatomy not demonstrated.  3. Enlarged left uterine and ovarian veins. Correlate for pelvic  congestion symptoms.    23-minutes were spent in evaluation, counseling, and documentation on the date of service.      Again, thank you for allowing me to participate in the care of your patient.      Sincerely,    Toshia CAMARILLO Cha, MD

## 2024-03-29 NOTE — PROGRESS NOTES
"Virtual Visit Details    Type of service:  Video Visit     Originating Location (pt. Location): {video visit patient location:294696::\"Home\"}  {PROVIDER LOCATION On-site should be selected for visits conducted from your clinic location or adjoining [facility] hospital, academic office, or other nearby [facility] building. Off-site should be selected for all other provider locations, including home:[id number]}  Distant Location (provider location):  {virtual location provider:354287}  Platform used for Video Visit: {Virtual Visit Platforms:496785::\"Vizibility\"}  "

## 2024-03-29 NOTE — NURSING NOTE
Is the patient currently in the state of [place]? YES    Visit mode:VIDEO    If the visit is dropped, the patient can be reconnected by: VIDEO VISIT: Text to cell phone:   Telephone Information:   Mobile [phone]           Will anyone else be joining the visit? NO  (If patient encounters technical issues they should call [phone])    How would you like to obtain your AVS? MyChart    Are changes needed to the allergy or medication list? Pt stated no changes to allergies and Pt stated no med changes    Reason for visit: STEPHANIE YAF

## 2024-04-02 NOTE — PROGRESS NOTES
"[facility]  Interventional Radiology Clinic  [address] [place] [address]  3RD FLOOR  [facility] [address]  Phone: [phone]  Fax: [phone]    Progress Note  Encounter Date: 4/3/2024    Patient: Oral Singh     MRN: [id number]  YOB: 1965       Age: 58 year old  Sex: Female        Referring Provider: Toshia MILAN Cha    Reason for Visit    Oral Singh is a 58 year old old female with chief complaint of chronic daily pressure headaches    History of Present Illness    Rin is a  woman with left ear deafness with head triggered symptoms of vertigo and lightheadedness with a \"wooshing\" sensation along with chronic daily pressure headaches and neck pain. All symptoms gradually worsen as the day goes on, and are amplified the more time she spends standing. There is some improvement with \"slowing down\" and stress reduction techniques, as well as eating healthier. Her past medical history is notable for mitral valve stenosis (Severity unknown) and hypertension.    Her symptoms started in  with an abrupt-onset episode of spinning room sensation, nystagmus, and emesis. She was ultimately admitted to the hospital. Since then she has had good days and bad days, but due to the severity of the symptoms had to quit her job 1.5 years ago. For one year she has been trying NUCCA every 3 weeks, which somewhat improves her dizziness and lasts a few days.     She denies pelvic pain, blood in urine, flank pain, lower extremity varicose veins and swelling/pain in the lower extremities. She denies venous disease running in her family.    Patient Medical History    Past Medical History:   Diagnosis Date    Arthritis        Past Surgical History:   Procedure Laterality Date    GYN SURGERY         Family and Social History    Family History   Problem Relation Age of Onset    Melanoma No family hx of     Skin Cancer No family hx of        Social History     Socioeconomic History    Marital status: "    Tobacco Use    Smoking status: Never    Smokeless tobacco: Never   Substance and Sexual Activity    Alcohol use: Yes    Drug use: No    Sexual activity: Yes     Birth control/protection: Male Surgical       Allergies    Allergies   Allergen Reactions    Sulfa Antibiotics Other (See Comments)     PN: blurred vision    Sulfamethoxazole-Trimethoprim Muscle Pain (Myalgia)    Ciprofloxacin Muscle Pain (Myalgia) and Rash       Medications    Current Outpatient Medications   Medication Sig Dispense Refill    COMBIPATCH 0.05-0.25 MG/DAY bi-weekly patch       diazepam (VALIUM) 2 MG tablet Take 0.5 tablets (1 mg) by mouth every 6 hours as needed for muscle spasms (for flying) 10 tablet 0    DULoxetine (CYMBALTA) 30 MG capsule       estradiol (VAGIFEM) 10 MCG TABS vaginal tablet Place 10 mcg vaginally      fish oil-omega-3 fatty acids 1000 MG capsule Take 2 g by mouth      losartan (COZAAR) 25 MG tablet Take 25 mg by mouth      SUMAtriptan (IMITREX) 50 MG tablet Take 1 tablet (50 mg) by mouth as needed for migraine 18 tablet 4    tacrolimus (PROTOPIC) 0.1 % external ointment Apply topically 2 times daily 60 g 11    topiramate (TOPAMAX) 50 MG tablet Take 1 tablet (50 mg) by mouth 2 times daily 60 tablet 11    traZODone (DESYREL) 50 MG tablet At Bedtime      vitamin B-Complex       Magnesium Glycinate 100 MG CAPS       tacrolimus (PROTOPIC) 0.1 % external ointment Daily and then twice daily for flares. Apply cold 60 g 11     Current Facility-Administered Medications   Medication Dose Route Frequency Provider Last Rate Last Admin    botulinum toxin type A (BOTOX) 100 units injection 100 Units  100 Units Intramuscular Q90 Days Lauren Mayo MD        Botulinum Toxin Type A (BOTOX) 200 units injection 200 Units  200 Units Intramuscular Once Toshia Palomo MD        [START ON 4/26/2024] incobotulinumtoxin A (XEOMIN) 100 units injection 100 Units  100 Units Intramuscular Q90 Days Toshia Palomo MD      "      Vitals    /81 (BP Location: Left arm, Patient Position: Sitting, Cuff Size: Adult Regular)   Pulse 80   Ht 1.651 m (5' 5\")   Wt 68 kg (150 lb)   BMI 24.96 kg/m      Body surface area is 1.77 meters squared.    BMI Percentile: Body mass index is 24.96 kg/m .    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 No dilation of right gonadal vein. No evidence of nutcracker phenomenon or NIVL.    Assessment/Plan:    Assessment: Rin is a 59 y/o  with a history of vestibular migraines, daily pressure headaches, and vertigo since . She has undergone medical therapy with Dr. Palomo as well as MARILUZ, with some temporary improvement. She presented today to be evaluated for a possible venous compressive etiology of her symptoms based on dilated left gonadal vein seen on CTV of the abdomen. She has no imaging features on CT A/P to suggest nutcracker phenomenon or NIVL. In addition, she has no evidence of dilated pelvic escape point veins. She was reassured that given her multiparous status it is not uncommon to have dilated gonadal veins in the absence of symptoms, and that we suspect patients with these imaging findings who have an underlying pelvic venous cause of their symptoms will have some imaging evidence of compensatory venous drainage into the lumbar venous plexus.    Plan:    - No indication for venography or IVUS at this time    Rene Youssef MD, VI  Interventional Radiology Attending    Total work time of 60 minutes spent on the patient's visit today that could have included any of the following activities that I personally performed outside of face-to-face care: documentation review and preparation; obtaining and reviewing additional history; " ordering diagnostic/therapeutic services, interpreting results and care coordination with patient and/or other providers.

## 2024-04-03 ENCOUNTER — OFFICE VISIT (OUTPATIENT)
Dept: DERMATOLOGY | Facility: CLINIC | Age: 59
End: 2024-04-03
Attending: STUDENT IN AN ORGANIZED HEALTH CARE EDUCATION/TRAINING PROGRAM
Payer: COMMERCIAL

## 2024-04-03 VITALS
DIASTOLIC BLOOD PRESSURE: 81 MMHG | WEIGHT: 150 LBS | BODY MASS INDEX: 24.99 KG/M2 | SYSTOLIC BLOOD PRESSURE: 124 MMHG | HEIGHT: 65 IN | HEART RATE: 80 BPM

## 2024-04-03 DIAGNOSIS — I87.1 COMPRESSION OF VEIN: ICD-10-CM

## 2024-04-03 DIAGNOSIS — N94.89 PELVIC CONGESTION: ICD-10-CM

## 2024-04-03 PROCEDURE — 99214 OFFICE O/P EST MOD 30 MIN: CPT | Performed by: STUDENT IN AN ORGANIZED HEALTH CARE EDUCATION/TRAINING PROGRAM

## 2024-04-03 PROCEDURE — 99205 OFFICE O/P NEW HI 60 MIN: CPT | Performed by: STUDENT IN AN ORGANIZED HEALTH CARE EDUCATION/TRAINING PROGRAM

## 2024-04-03 NOTE — LETTER
"4/3/2024      RE: Oral Singh  [address] [place] [address]     Dear Colleague,    Thank you for the opportunity to participate in the care of your patient, Oral Singh, at the [facility] at [facility]. Please see a copy of my visit note below.    [facility]  Interventional Radiology Clinic  [address] [place] [address]  3RD FLOOR  [facility] [address]  Phone: [phone]  Fax: [phone]    Progress Note  Encounter Date: 4/3/2024    Patient: Oral Singh     MRN: [id number]  YOB: 1965       Age: 58 year old  Sex: Female        Referring Provider: Toshia MILAN Cha    Reason for Visit    Oral Singh is a 58 year old old female with chief complaint of chronic daily pressure headaches    History of Present Illness    Rin is a  woman with left ear deafness with head triggered symptoms of vertigo and lightheadedness with a \"wooshing\" sensation along with chronic daily pressure headaches and neck pain. All symptoms gradually worsen as the day goes on, and are amplified the more time she spends standing. There is some improvement with \"slowing down\" and stress reduction techniques, as well as eating healthier. Her past medical history is notable for mitral valve stenosis (Severity unknown) and hypertension.    Her symptoms started in  with an abrupt-onset episode of spinning room sensation, nystagmus, and emesis. She was ultimately admitted to the hospital. Since then she has had good days and bad days, but due to the severity of the symptoms had to quit her job 1.5 years ago. For one year she has been trying NUCCA every 3 weeks, which somewhat improves her dizziness and lasts a few days.     She denies pelvic pain, blood in urine, flank pain, lower extremity varicose veins and swelling/pain in the lower extremities. She denies venous disease running in her " family.    Patient Medical History    Past Medical History:   Diagnosis Date    Arthritis        Past Surgical History:   Procedure Laterality Date    GYN SURGERY         Family and Social History    Family History   Problem Relation Age of Onset    Melanoma No family hx of     Skin Cancer No family hx of        Social History     Socioeconomic History    Marital status:    Tobacco Use    Smoking status: Never    Smokeless tobacco: Never   Substance and Sexual Activity    Alcohol use: Yes    Drug use: No    Sexual activity: Yes     Birth control/protection: Male Surgical       Allergies    Allergies   Allergen Reactions    Sulfa Antibiotics Other (See Comments)     PN: blurred vision    Sulfamethoxazole-Trimethoprim Muscle Pain (Myalgia)    Ciprofloxacin Muscle Pain (Myalgia) and Rash       Medications    Current Outpatient Medications   Medication Sig Dispense Refill    COMBIPATCH 0.05-0.25 MG/DAY bi-weekly patch       diazepam (VALIUM) 2 MG tablet Take 0.5 tablets (1 mg) by mouth every 6 hours as needed for muscle spasms (for flying) 10 tablet 0    DULoxetine (CYMBALTA) 30 MG capsule       estradiol (VAGIFEM) 10 MCG TABS vaginal tablet Place 10 mcg vaginally      fish oil-omega-3 fatty acids 1000 MG capsule Take 2 g by mouth      losartan (COZAAR) 25 MG tablet Take 25 mg by mouth      SUMAtriptan (IMITREX) 50 MG tablet Take 1 tablet (50 mg) by mouth as needed for migraine 18 tablet 4    tacrolimus (PROTOPIC) 0.1 % external ointment Apply topically 2 times daily 60 g 11    topiramate (TOPAMAX) 50 MG tablet Take 1 tablet (50 mg) by mouth 2 times daily 60 tablet 11    traZODone (DESYREL) 50 MG tablet At Bedtime      vitamin B-Complex       Magnesium Glycinate 100 MG CAPS       tacrolimus (PROTOPIC) 0.1 % external ointment Daily and then twice daily for flares. Apply cold 60 g 11     Current Facility-Administered Medications   Medication Dose Route Frequency Provider Last Rate Last Admin    botulinum toxin type  "A (BOTOX) 100 units injection 100 Units  100 Units Intramuscular Q90 Days Lauren Mayo MD        Botulinum Toxin Type A (BOTOX) 200 units injection 200 Units  200 Units Intramuscular Once Toshia Palomo MD        [START ON 2024] incobotulinumtoxin A (XEOMIN) 100 units injection 100 Units  100 Units Intramuscular Q90 Days Toshia Palomo MD           Vitals    /81 (BP Location: Left arm, Patient Position: Sitting, Cuff Size: Adult Regular)   Pulse 80   Ht 1.651 m (5' 5\")   Wt 68 kg (150 lb)   BMI 24.96 kg/m      Body surface area is 1.77 meters squared.    BMI Percentile: Body mass index is 24.96 kg/m .    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 No dilation of right gonadal vein. No evidence of nutcracker phenomenon or NIVL.    Assessment/Plan:    Assessment: Rin is a 57 y/o  with a history of vestibular migraines, daily pressure headaches, and vertigo since . She has undergone medical therapy with Dr. Palomo as well as MARILUZ, with some temporary improvement. She presented today to be evaluated for a possible venous compressive etiology of her symptoms based on dilated left gonadal vein seen on CTV of the abdomen. She has no imaging features on CT A/P to suggest nutcracker phenomenon or NIVL. In addition, she has no evidence of dilated pelvic escape point veins. She was reassured that given her multiparous status it is not uncommon to have dilated gonadal veins in the absence of symptoms, and that we suspect patients with these imaging findings who have an underlying pelvic venous cause of their symptoms will have some imaging evidence of compensatory venous drainage into the lumbar venous plexus. We do not have chest imaging right now " to evaluate for a potential left brachiocephalic vein compression, which I will chat with Dr. Palomo about as that is another route of current investigation. Her story of symptoms worsening throughout the day does fit with a venous origin of her symptoms.    Plan:    - No indication for venography or IVUS at this time  - Will chat with Dr. Palomo about pursuing CTV of chest    Rene Youssef MD, [facility]  Interventional Radiology Attending    Total work time of 60 minutes spent on the patient's visit today that could have included any of the following activities that I personally performed outside of face-to-face care: documentation review and preparation; obtaining and reviewing additional history; ordering diagnostic/therapeutic services, interpreting results and care coordination with patient and/or other providers.      Please do not hesitate to contact me if you have any questions/concerns.     Sincerely,       Rene Youssef MD

## 2024-04-03 NOTE — PATIENT INSTRUCTIONS
Rin you have had your consult today with Dr Youssef regarding referral from Dr Palomo.     Plan:    No further follow up with IR needed.     Thanks,     A. Ene Gilmore, RN, BSN  Interventional Radiology Care Coordinator   Phone:  [phone]

## 2024-04-03 NOTE — NURSING NOTE
"[facility] [[id number]]  Chief Complaint   Patient presents with    Consult     VLC, pelvic congestion     Initial /81 (BP Location: Left arm, Patient Position: Sitting, Cuff Size: Adult Regular)   Pulse 80   Ht 5' 5\" (165.1 cm)   Wt 150 lb (68 kg)   BMI 24.96 kg/m   Estimated body mass index is 24.96 kg/m  as calculated from the following:    Height as of this encounter: 5' 5\" (165.1 cm).    Weight as of this encounter: 150 lb (68 kg).  Medication Reconciliation: complete    Does the patient need any medication refills today? No    Does the patient/parent need MyChart or Proxy acces today? No    Does the patient want a flu shot today? No    Leeanna Otero, EMT              "

## 2024-04-25 DIAGNOSIS — G24.3 CERVICAL DYSTONIA: Primary | ICD-10-CM

## 2024-04-25 NOTE — PROGRESS NOTES
PROCEDURE NOTE: Intramuscular Abobotulinum Toxin-A (Dysport) Injection Under Ultrasound Guidance    PROCEDURE DATE: 4/26/2024    PATIENT NAME: Oral Singh  YOB: 1965    ATTENDING PHYSICIAN: Toshia CAMARILLO Cha, MD    PRIOR NEUROTOXIN: none    PREOPERATIVE DIAGNOSIS:   1. Cervical dystonia    2. Muscle spasm    3. Contracture of sternocleidomastoid muscle       POSTOPERATIVE DIAGNOSIS: same    PROCEDURE PERFORMED: Bilateral Sternocleidomastoid and Anterior Scalene Muscle Abobotulinum Toxin-A (Dysport) Injection Under Ultrasound Guidance    ULTRASOUND WAS USED.     INDICATIONS FOR THE PROCEDURE:  Oral Singh is a 58 year old female who presents with cervical dystonia.    PROCEDURE AND FINDINGS:  She was greeted in the clinic. The risk, benefits and alternatives to the procedure were again reviewed with her and informed consent was obtained and the patient agreed to proceed. A time-out was performed. Following review alternatives, benefits and risks, the procedure was carried out under sterile prep with sterile gel. The use of direct sonographic guidance was used to ensure accurate placement of the needle (rather than non-guided injection) and required to minimize the risk of bleeding or injury to nearby neurovascular structures. Images were recorded and stored.     A 15-6MHz ultrasound transducer was used to visualize the relevant structures and determine the optimal needle path for the procedure. A 27 gauge 1.5 inch needle was advanced utilizing an out-of-plane approach, under continuous ultrasound guidance to the sternocleidomastoid and anterior scalene muscle(s) on the bilateral side(s). The tip of the needle was visualized throughout the procedure. The remainder of the single-use vials were discarded.      300 units of abobotulinum toxin was diluted to 125 units/mL (12.5 units/0.1ml) of preservative free saline (300 units diluted in 2.4ml saline). The following muscles were  injected:    37.5 units per anterior scalene muscle on the bilateral side(s)  37.5 units per sternocleidomastoid muscle on the bilateral side(s)  ____________________________________    150 total units used.   The remainder (150 units) of the single-use vials were discarded.      She tolerated the procedure well, was discharged home in stable condition.     Follow-up will be in clinic      COMPLICATIONS: None    COMMENTS: None

## 2024-04-26 ENCOUNTER — OFFICE VISIT (OUTPATIENT)
Dept: NEUROLOGY | Facility: CLINIC | Age: 59
End: 2024-04-26
Payer: COMMERCIAL

## 2024-04-26 DIAGNOSIS — M43.6 CONTRACTURE OF STERNOCLEIDOMASTOID MUSCLE: ICD-10-CM

## 2024-04-26 DIAGNOSIS — G24.3 CERVICAL DYSTONIA: Primary | ICD-10-CM

## 2024-04-26 DIAGNOSIS — M62.838 MUSCLE SPASM: ICD-10-CM

## 2024-04-26 DIAGNOSIS — G43.719 INTRACTABLE CHRONIC MIGRAINE WITHOUT AURA AND WITHOUT STATUS MIGRAINOSUS: ICD-10-CM

## 2024-04-26 PROCEDURE — 20553 NJX 1/MLT TRIGGER POINTS 3/>: CPT | Performed by: PSYCHIATRY & NEUROLOGY

## 2024-04-26 PROCEDURE — 76942 ECHO GUIDE FOR BIOPSY: CPT | Performed by: PSYCHIATRY & NEUROLOGY

## 2024-04-26 RX ORDER — TOPIRAMATE 50 MG/1
50 TABLET, FILM COATED ORAL 2 TIMES DAILY
Qty: 60 TABLET | Refills: 11 | Status: SHIPPED | OUTPATIENT
Start: 2024-04-26

## 2024-04-26 NOTE — LETTER
4/26/2024       RE: Oral Singh  [address] [place] [address]     Dear Colleague,    Thank you for referring your patient, Oral Singh, to the [facility] [place] at [facility]. Please see a copy of my visit note below.    PROCEDURE NOTE: Intramuscular Abobotulinum Toxin-A (Dysport) Injection Under Ultrasound Guidance    PROCEDURE DATE: 4/26/2024    PATIENT NAME: Oral Singh  YOB: 1965    ATTENDING PHYSICIAN: Toshia CAMARILLO Cha, MD    PRIOR NEUROTOXIN: none    PREOPERATIVE DIAGNOSIS:   1. Cervical dystonia    2. Muscle spasm    3. Contracture of sternocleidomastoid muscle       POSTOPERATIVE DIAGNOSIS: same    PROCEDURE PERFORMED: Bilateral Sternocleidomastoid and Anterior Scalene Muscle Abobotulinum Toxin-A (Dysport) Injection Under Ultrasound Guidance    ULTRASOUND WAS USED.     INDICATIONS FOR THE PROCEDURE:  Oral Singh is a 58 year old female who presents with cervical dystonia.    PROCEDURE AND FINDINGS:  She was greeted in the clinic. The risk, benefits and alternatives to the procedure were again reviewed with her and informed consent was obtained and the patient agreed to proceed. A time-out was performed. Following review alternatives, benefits and risks, the procedure was carried out under sterile prep with sterile gel. The use of direct sonographic guidance was used to ensure accurate placement of the needle (rather than non-guided injection) and required to minimize the risk of bleeding or injury to nearby neurovascular structures. Images were recorded and stored.     A 15-6MHz ultrasound transducer was used to visualize the relevant structures and determine the optimal needle path for the procedure. A 27 gauge 1.5 inch needle was advanced utilizing an out-of-plane approach, under continuous ultrasound guidance to the sternocleidomastoid and anterior scalene muscle(s) on the  bilateral side(s). The tip of the needle was visualized throughout the procedure. The remainder of the single-use vials were discarded.      300 units of abobotulinum toxin was diluted to 20 units/mL of preservative free saline (300 units diluted in 2.4ml saline). The following muscles were injected:    37.5 units per anterior scalene muscle on the bilateral side(s)  37.5 units per sternocleidomastoid muscle on the bilateral side(s)  ____________________________________    150 total units used.   The remainder (150 units) of the single-use vials were discarded.      She tolerated the procedure well, was discharged home in stable condition.     Follow-up will be in clinic      COMPLICATIONS: None    COMMENTS: None               Again, thank you for allowing me to participate in the care of your patient.      Sincerely,    Toshia CAMARILLO Cha, MD

## 2024-06-07 ENCOUNTER — TELEPHONE (OUTPATIENT)
Dept: DERMATOLOGY | Facility: CLINIC | Age: 59
End: 2024-06-07
Payer: COMMERCIAL

## 2024-06-07 NOTE — TELEPHONE ENCOUNTER
Spoke with Rin and advised that if she has one or two moles she is concerned about that we can look, but unable to do a FBSE at that appointment.     Rin expresses understanding and would like to schedule next available for a skin check. Advised next available with Dr. Ag is 1/7 for FBSE, but can schedule with another provider. Rin will accept 1/7 and copy to waitlist.     Writer scheduled 1/7 and copied to waitlist    QUOC Bowen

## 2024-06-07 NOTE — TELEPHONE ENCOUNTER
[facility] Call Center    Phone Message    May a detailed message be left on voicemail: yes     Reason for Call: Other: Rin asking if she can also have some moles checked during 07/08 appt     Action Taken: Message routed to:  [facility] Derm    Travel Screening: Not Applicable     Date of Service:

## 2024-07-08 ENCOUNTER — OFFICE VISIT (OUTPATIENT)
Dept: DERMATOLOGY | Facility: CLINIC | Age: 59
End: 2024-07-08
Attending: DERMATOLOGY
Payer: COMMERCIAL

## 2024-07-08 DIAGNOSIS — N76.3 ZOON'S VULVITIS: Primary | ICD-10-CM

## 2024-07-08 PROCEDURE — 99213 OFFICE O/P EST LOW 20 MIN: CPT | Performed by: DERMATOLOGY

## 2024-07-08 ASSESSMENT — PAIN SCALES - GENERAL: PAINLEVEL: NO PAIN (0)

## 2024-07-08 NOTE — NURSING NOTE
Dermatology Rooming Note    Oral Singh's goals for this visit include:   Chief Complaint   Patient presents with    Derm Problem     Zoon's vulvitis- much improved.      Nya Mccollum CA

## 2024-07-08 NOTE — PROGRESS NOTES
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       Follow-up: 6 month(s) in-person, or earlier for new or changing lesions    Staff:     Quynh Ag MD  ____________________________________________    CC: Derm Problem (Zoon's vulvitis- much improved. )    HPI:  Ms. Oral Singh is a(n) 59 year old female who presents today as a return patient for zoon's vulvitis.  Notes improvement with tacrolimus ointment but when stops the ointment, notes redness recurring.  Symptoms are much better.    She does wonder about her spots on her cheeks    Patient is otherwise feeling well, without additional skin concerns.     Labs Reviewed:  N/A    Physical Exam:  Vitals: There were no vitals taken for this visit.  SKIN: Focused examination of face and vulva was performed.  She deferred a nurse chaperone  - normal vulva exam  - brown patches and thin pink patches with no scale of bilateral zygomatic cheeks  - No other lesions of concern on areas examined.     Medications:  Current Outpatient Medications   Medication Sig Dispense Refill    COMBIPATCH 0.05-0.25 MG/DAY bi-weekly patch       diazepam (VALIUM) 2 MG tablet Take 0.5 tablets (1 mg) by mouth every 6 hours as needed for muscle spasms (for flying) 10 tablet 0    DULoxetine (CYMBALTA) 30 MG capsule       estradiol (VAGIFEM) 10 MCG TABS vaginal tablet Place 10 mcg vaginally      fish oil-omega-3 fatty acids 1000 MG capsule Take 2 g by mouth      losartan (COZAAR) 25 MG tablet Take 25 mg by mouth       SUMAtriptan (IMITREX) 50 MG tablet Take 1 tablet (50 mg) by mouth as needed for migraine 18 tablet 4    tacrolimus (PROTOPIC) 0.1 % external ointment Apply topically 2 times daily 60 g 11    topiramate (TOPAMAX) 50 MG tablet Take 1 tablet (50 mg) by mouth 2 times daily 60 tablet 11    traZODone (DESYREL) 50 MG tablet At Bedtime      vitamin B-Complex        Current Facility-Administered Medications   Medication Dose Route Frequency Provider Last Rate Last Admin    botulinum toxin type A (BOTOX) 100 units injection 100 Units  100 Units Intramuscular Q90 Days Lauren Mayo MD        Botulinum Toxin Type A (BOTOX) 200 units injection 200 Units  200 Units Intramuscular Once Toshia Palomo MD        botulinum toxin type A (DYSPORT) injection 300 Units  300 Units Intramuscular Q90 Days    300 Units at 04/26/24 1310    incobotulinumtoxin A (XEOMIN) 100 units injection 100 Units  100 Units Intramuscular Q90 Days Toshia Palomo MD          Past Medical History:   Patient Active Problem List   Diagnosis    Asthma    Condyloma acuminatum    H/O arthroscopic knee surgery    Impingement syndrome of right shoulder    Insomnia, idiopathic    Major depressive disorder, single episode, mild (H24)    Migraine variant    Congenital mitral valve prolapse    Nonspecific immunological findings    Osteoarthritis of multiple joints    Vertigo     Past Medical History:   Diagnosis Date    Arthritis        CC Quynh Ag MD  [address]  [place] [address] on close of this encounter.

## 2024-07-08 NOTE — LETTER
7/8/2024       RE: Oral Singh  [address] [place] [address]     Dear Colleague,    Thank you for referring your patient, Oral Singh, to the [facility] [place] at [facility]. Please see a copy of my visit note below.    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       Follow-up: 6 month(s) in-person, or earlier for new or changing lesions    Staff:     Quynh Ag MD  ____________________________________________    CC: Derm Problem (Zoon's vulvitis- much improved. )    HPI:  Ms. Oral Singh is a(n) 59 year old female who presents today as a return patient for zoon's vulvitis.  Notes improvement with tacrolimus ointment but when stops the ointment, notes redness recurring.  Symptoms are much better.    She does wonder about her spots on her cheeks    Patient is otherwise feeling well, without additional skin concerns.     Labs Reviewed:  N/A    Physical Exam:  Vitals: There were no vitals taken for this visit.  SKIN: Focused examination of face and vulva was performed.  She deferred a nurse chaperone  - normal vulva exam  - brown patches and thin pink patches with no scale of bilateral zygomatic cheeks  - No other lesions of concern on areas examined.     Medications:  Current Outpatient Medications   Medication Sig Dispense Refill     COMBIPATCH 0.05-0.25 MG/DAY bi-weekly patch        diazepam (VALIUM) 2 MG tablet Take 0.5 tablets  (1 mg) by mouth every 6 hours as needed for muscle spasms (for flying) 10 tablet 0     DULoxetine (CYMBALTA) 30 MG capsule        estradiol (VAGIFEM) 10 MCG TABS vaginal tablet Place 10 mcg vaginally       fish oil-omega-3 fatty acids 1000 MG capsule Take 2 g by mouth       losartan (COZAAR) 25 MG tablet Take 25 mg by mouth       SUMAtriptan (IMITREX) 50 MG tablet Take 1 tablet (50 mg) by mouth as needed for migraine 18 tablet 4     tacrolimus (PROTOPIC) 0.1 % external ointment Apply topically 2 times daily 60 g 11     topiramate (TOPAMAX) 50 MG tablet Take 1 tablet (50 mg) by mouth 2 times daily 60 tablet 11     traZODone (DESYREL) 50 MG tablet At Bedtime       vitamin B-Complex        Current Facility-Administered Medications   Medication Dose Route Frequency Provider Last Rate Last Admin     botulinum toxin type A (BOTOX) 100 units injection 100 Units  100 Units Intramuscular Q90 Days Lauren Mayo MD         Botulinum Toxin Type A (BOTOX) 200 units injection 200 Units  200 Units Intramuscular Once Toshia Palomo MD         botulinum toxin type A (DYSPORT) injection 300 Units  300 Units Intramuscular Q90 Days    300 Units at 04/26/24 1310     incobotulinumtoxin A (XEOMIN) 100 units injection 100 Units  100 Units Intramuscular Q90 Days Toshia Palomo MD          Past Medical History:   Patient Active Problem List   Diagnosis     Asthma     Condyloma acuminatum     H/O arthroscopic knee surgery     Impingement syndrome of right shoulder     Insomnia, idiopathic     Major depressive disorder, single episode, mild (H24)     Migraine variant     Congenital mitral valve prolapse     Nonspecific immunological findings     Osteoarthritis of multiple joints     Vertigo     Past Medical History:   Diagnosis Date     Arthritis        CC Quynh Ag MD  [address]  [place] [address] on close of this encounter.       Again, thank you for allowing me to participate in the care of your  patient.      Sincerely,    Quynh gA MD

## 2024-07-29 NOTE — PROGRESS NOTES
PROCEDURE NOTE: Intramuscular Incobotulinum Toxin-A (Dysport) Injection Under Ultrasound Guidance    PROCEDURE DATE: 8/2/2024    PATIENT NAME: Oral Singh  YOB: 1965    ATTENDING PHYSICIAN: Toshia CAMARILLO Cha, MD    PRIOR NEUROTOXIN  4-26-24  37.5 units per anterior scalene muscle on the bilateral side(s)  37.5 units per sternocleidomastoid muscle on the bilateral side(s)  Didn't notice much improvement in the dizziness.   There was a little difficulty with neck flexion. No difficulty with getting head off the bed. That wore of.   Noted an episode of white-out of lower part of both visual fields when she had been turning the head to the right. This was followed by some sleepiness. This does not happen turning to the left. There was no pain in the face when this happens. This can also happen lying on the right side, she gets sleepier faster. There is also right sided tinnitus when lying on the right side (deaf in the left ear).     PREOPERATIVE DIAGNOSIS:   1. Cervical dystonia       PROCEDURE PERFORMED: Bilateral Sternocleidomastoid and Anterior Scalene Muscle Abobotulinum Toxin-A (Dysport) Injection Under Ultrasound Guidance     ULTRASOUND WAS USED.     INDICATIONS FOR THE PROCEDURE:  Oral Singh is a 58 year old female who presents with cervical dystonia.     PROCEDURE AND FINDINGS:  She was greeted in the clinic. The risk, benefits and alternatives to the procedure were again reviewed with her and informed consent was obtained and the patient agreed to proceed. A time-out was performed. Following review alternatives, benefits and risks, the procedure was carried out under sterile prep with sterile gel. The use of direct sonographic guidance was used to ensure accurate placement of the needle (rather than non-guided injection) and required to minimize the risk of bleeding or injury to nearby neurovascular structures. Images were recorded and stored.      A 15-6MHz ultrasound transducer  was used to visualize the relevant structures and determine the optimal needle path for the procedure. A 27 gauge 1.5 inch needle was advanced utilizing an out-of-plane approach, under continuous ultrasound guidance to the sternocleidomastoid and anterior scalene muscle(s) on the bilateral side(s). The tip of the needle was visualized throughout the procedure. The remainder of the single-use vials were discarded.       300 units of abobotulinum toxin was diluted to 125 units/mL of preservative free saline (300 units diluted in 2.4ml saline = 12.5units/0.1ml). The following muscles were injected:     50 units per anterior scalene muscle on the bilateral side(s)--0.8 ml total  50 units per sternocleidomastoid muscle on the bilateral side(s)--0.8 ml total  ____________________________________     200 total units used.   The remainder (100 units) of the single-use vials were discarded.      She tolerated the procedure well, was discharged home in stable condition.     Follow-up will be in clinic      COMPLICATIONS: None    COMMENTS: None

## 2024-08-02 ENCOUNTER — OFFICE VISIT (OUTPATIENT)
Dept: NEUROLOGY | Facility: CLINIC | Age: 59
End: 2024-08-02
Payer: COMMERCIAL

## 2024-08-02 DIAGNOSIS — G24.3 CERVICAL DYSTONIA: Primary | ICD-10-CM

## 2024-08-02 PROCEDURE — 64616 CHEMODENERV MUSC NECK DYSTON: CPT | Mod: 50 | Performed by: PSYCHIATRY & NEUROLOGY

## 2024-08-02 PROCEDURE — 76942 ECHO GUIDE FOR BIOPSY: CPT | Performed by: PSYCHIATRY & NEUROLOGY

## 2024-08-02 NOTE — LETTER
8/2/2024       RE: Oral Singh  [address] [place] [address]       Dear Colleague,    Thank you for referring your patient, Oral Singh, to the [facility] NEUROLOGY [facility]IN[facility] [place] at [facility]. Please see a copy of my visit note below.    PROCEDURE NOTE: Intramuscular Incobotulinum Toxin-A (Dysport) Injection Under Ultrasound Guidance    PROCEDURE DATE: 8/2/2024    PATIENT NAME: Oral Singh  YOB: 1965    ATTENDING PHYSICIAN: Toshia CAMARILLO Cha, MD    PRIOR NEUROTOXIN  4-26-24  37.5 units per anterior scalene muscle on the bilateral side(s)  37.5 units per sternocleidomastoid muscle on the bilateral side(s)  Didn't notice much improvement in the dizziness.   There was a little difficulty with neck flexion. No difficulty with getting head off the bed. That wore of.   Noted an episode of white-out of lower part of both visual fields when she had been turning the head to the right. This was followed by some sleepiness. This does not happen turning to the left. There was no pain in the face when this happens. This can also happen lying on the right side, she gets sleepier faster. There is also right sided tinnitus when lying on the right side (deaf in the left ear).     PREOPERATIVE DIAGNOSIS:   1. Cervical dystonia       PROCEDURE PERFORMED: Bilateral Sternocleidomastoid and Anterior Scalene Muscle Abobotulinum Toxin-A (Dysport) Injection Under Ultrasound Guidance     ULTRASOUND WAS USED.     INDICATIONS FOR THE PROCEDURE:  Oral Singh is a 58 year old female who presents with cervical dystonia.     PROCEDURE AND FINDINGS:  She was greeted in the clinic. The risk, benefits and alternatives to the procedure were again reviewed with her and informed consent was obtained and the patient agreed to proceed. A time-out was performed. Following review alternatives, benefits and risks, the procedure was carried out under  sterile prep with sterile gel. The use of direct sonographic guidance was used to ensure accurate placement of the needle (rather than non-guided injection) and required to minimize the risk of bleeding or injury to nearby neurovascular structures. Images were recorded and stored.      A 15-6MHz ultrasound transducer was used to visualize the relevant structures and determine the optimal needle path for the procedure. A 27 gauge 1.5 inch needle was advanced utilizing an out-of-plane approach, under continuous ultrasound guidance to the sternocleidomastoid and anterior scalene muscle(s) on the bilateral side(s). The tip of the needle was visualized throughout the procedure. The remainder of the single-use vials were discarded.       300 units of abobotulinum toxin was diluted to 125 units/mL of preservative free saline (300 units diluted in 2.4ml saline = 12.5units/0.1ml). The following muscles were injected:     50 units per anterior scalene muscle on the bilateral side(s)--0.8 ml total  50 units per sternocleidomastoid muscle on the bilateral side(s)--0.8 ml total  ____________________________________     200 total units used.   The remainder (100 units) of the single-use vials were discarded.      She tolerated the procedure well, was discharged home in stable condition.     Follow-up will be in clinic      COMPLICATIONS: None    COMMENTS: None              Again, thank you for allowing me to participate in the care of your patient.      Sincerely,    Toshia CAMARILLO Cha, MD

## 2024-08-02 NOTE — NURSING NOTE
Chief Complaint   Patient presents with    Procedure     Here for injections, confirmed with patient     Mony Navarrete

## 2024-10-10 NOTE — PROGRESS NOTES
"The patient is being evaluated via a billable video visit.    How would you like to obtain your AVS? MyChart  If the video visit is dropped, the invitation should be resent by: Send to e-mail at: [email or web address]  Will anyone else be joining your video visit? No      Video-Visit Details  Type of service:  Video Visit  Video Start Time:3:33 PM  Video End Time:3:52 PM  Originating Location (pt. 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  She also has chronic daily pressure headaches and neck pain.  She has chronic bilateral upper extremity weakness. There is some chronic swelling in the hands but also in the feet more recently.  Paresthesias in the hand and the headache have been worse on the right side by history but on exam today paresthesias are worse in the left hand.  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      She continues to feel that the arms are more tired than the legs. Dizziness is worse with flexion. Tinnitus is worse with head extension. Headache is worse with extension. There is no throat pressure. No problems swallowing.      Ultrasound on 4-13-21 showed elevated right internal jugular vein velocities compared to the left but no areas of obstruction. CT venogram on 7-19-21 showed elongated styloid processes at 3.0/3.1cm bilaterally      She still experiences pressure on the top of the head at least once a week that can last 12-24 hours. It is not worse on one side. She is having " the lightheadedness episodes throughout the day. She can trigger several a day triggered by fast head movements. They are lasting about 30-minutes to get back to baseline. This is worse with bending the head forward. She also notes a chronic cough since the beginning of the year. She coughs throughout the day. She may wake up in the middle of the night with coughing. There have been no voice changes. No pressure in the throat. No pain when swallowing.      A trial of topiramate escalated to 100mg BID did not help, so she is down to 50mg BID. Symptoms did not get worse when she came down. She has not had physical therapy.     We discussed the 2 imaging studies and the relevance that they may have to her current symptoms.  I think it be worthwhile given there conservative nature to try a course of physical therapy addressing the thoracic outlet and sternocleidomastoid area.  We would also consider escalating her diagnostic testing with a referral to my colleagues in physical medicine and rehab for a muscle block.  She also notes that her lightheadedness is significantly worse with head flexion.  Given enough time past since her last CT scan she is agreeable to try to CT venogram in the head flexed position which I have ordered.  This may help us with choosing all the correct muscles to inject if she does end up being referred for a muscle block.     Plan:  1. Physical therapy for thoracic outlet as well sternocleidomastoid area  2. CT venogram in the neutral and head flexed positions.   3. Consider trial muscle block with PM&R.      Interim History 1-28-22:  CT venogram 10-9-21: Styloid process measures 2.9 cm on the right.  Styloid process measures 3.5 cm on the left. Mild right and moderate left upper internal jugular venous narrowing with flexion.     She has had 8 sessions of PT through December 2021. She would better on the day of the session but she would revert to her baseline sessions the next day. She didn't  see any sustained improvement in the headache or dizziness but she does still do some neck stretches at home because the neck feels better.      She still has a chronic daily pressure headaches mostly in the front of the head but can be at the base of the skull. There have been no spinning very episodes since our last visit. But she continues to experience the lightheadedness mostly in the upright position, sitting and standing but better laying down.      There is still arm weakness but not so much the tingling.   We discussed the plan going forward to escalate to trying muscle blocks and Botox. She is agreeable with this plan.      Interim History 5-27-22:  5-9-22: PROCEDURE NOTE: Anterior Scalene Muscle and Sternocleidomastoid Muscle (SCM) Injection Under Ultrasound Guidance.  Before the procedure, she reported a pain score of 6/10.   After the procedure, she reported a pain score of 6/10.      She was fine in the room, but once she got out into the aranda[place]way she became very faint which lasted until she sat down. It lasted for about 5-minutes. She was able to drive home. For the rest of the day she felt better than her baseline. The lightheadedness/disorientation/pressure decreased it from 7/10 to 3/10 after two hours and it was 4/10 at 4 hours. She was back at her baseline by the evening. The next morning was the same as other morning. There was no neck tenderness. She is having arm tingling in the right once in a while, not too bad.      Was seen at the [facility] for question of CSF leak. CSF cisternogram from 1-18-21 was reviewed which was negative for a leak. She was a diagnosis of possible vestibular neuritis, vestibular migraine, and PPPD. Was referred to Jasmin Aceves in ENT with whom she has an appointment on 12-1-22.      She is still on topiramate 50mg BID. She is done with physical therapy. She is not taking sumatriptan usually, only for super lightheadedness. Her symptoms are  particularly bad with head flexion triggering faintness. Head extension is okay. Also, liying on the right side will trigger faintness. There is no globus sensation. No throat pressure.      Plan:  1. Request Botox chronic migraine (failed topiramate and amitriptyline).   2. If insufficiently helpful, would request vascular surgery evaluation for potential left styloid resection.      Interim History 9-23-22:  9-19-22: Bilateral occipital nerve blocks, trigger point injections.  Botox was not approved by insurance.      She has been very dizzy this summer. She has a lot of pressure in the ears, especially the left ear. She went to an ENT and had an MRI yesterday. She was given a diagnosis of vestibular migraine. She has been recommended to have trigger point injections which she had 4 days ago. This has not helped. She has tried a gluten free diet.     She has had really bad headaches in August over her son's wedding. This lasted about 5 days.      She has had no days with no head pressure. There is pressure behind the eyes. She is continuing to feel very off balanced and lightheaded. The lightheadedness is better sitting. When she stands, the lightheadedness is worse. Head movement will make her more lightheaded. Moving the head to the right is worse than moving to the left. Looking down is worse than looking up. Looking down and to the right is the worst. She is better is lying down. It makes the head pressure better. She has not fainted.      The arms and hands both feel weak. The fingers can get tingling. There is no color change. There is no swelling. There is no problem with washing hair.      She is taking topiramate 50mg BID. Of note she had a cisternogram at [place] on January 2021 that was negative and had a opening pressure of 13.5mg H20. The CSF was normal except for an elevated protein of 71.      Chronic daily migraines with intractable vestibular symptoms. Patient has extracranial venous compression,  perhaps as a  of these symptoms.      Plan:   1. 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 Methazolamide titrated to 50mg BID. When at goal dose start tapering the topiramate 25mg by each week.  4. Approval for Botox for chronic migraine with PM&R after clarification that he has failed 2.5 years of amitriptyline and 2.5 years of topiramate)  5. Follow-up in November 18th at 4pm for a video visit     INTERIM HISTORY 11/18/2022:  Botox had been denied by her insurance company despite a [id number] review and several requests. She had already failed over 2-years of topiramate and amitriptyline.      She was able to see Dr. Cristian Guadarrama on 11-9-22 for consideration of whether jugular vein compression could be contributing to her symptoms. She is scheduled for a venogram on 12-9-22. The appointment with [facility] on 12-1-22 has been cancelled.      Her headache and vertigo remained severe with head pressure every day and extreme sensitivity to head movement. There is continued bilateral ear pressure and tinnitus in the right ear because left ear is deaf. The tinnitus can change with bending over or with head movements.      Methazolamide titrated to 50mg BID went fine. She has been able to taper off the topiramate. She does feel more cognitively more foggy even without an increase in head pressure. There is neck tightness and pain symmetric. There is sometimes pain behind the right ear, where the styloid is.      She has no other major events. No falls. No numbness, tingling, or weakness in the hand.      PLAN:  1. Methazolamide 50mg BID tablets  2. Aware venogram for diagnosis of possible SCM syndrome     Interim History 4/14/2023:  12-9-22:  Venogram with Dr. Guadarrama: Normal     She still has daily dizziness and light headedness. She is seeing Duncan Peter for NUCCA treatment for C1 adjustment. She feels better  right after the adjustment but it lasts maybe 24 hrs at best. It does help with both the headache and the lightheadedness. She was doing weekly for 4 weeks but now she is doing every other week. She last did PT at the end of 2021.      She is on methazolamide 50mg BID is the same as topiramate as 50mg BID. Both had the same efficacy for the headache but topiramate is much cheaper.     She has stopped working since our last appointment due to symptoms.  She was previously on amitriptyline and topiramate.   She does continues to have a lot of neck soreness, especially under the right ear.   Had an episode of severe rocking vertigo after driving in a car.      Plan:  1. Taper methazolamide and titrate up topiramate  2. Trial of Emgality for intractable migraines  3. 1mg of diazepam for flying, avoid alcohol.  4. Neck protection during flying  5. Continue maintenance NUCCA   6. Add posterior neck strengthening exercises     Interim History 10/27/2023:  Is on topiramate 50mg BID, which is better than the methazolamide in terms of side effects of nausea and fatigue.   The Emgality was taken for 2 rounds, no change in headaches but also no side effects. The headaches are better lying down.   She is continuing to do NUCCA, with Dr. Peter, helps for about 2 days. Once every 3 weeks.      She takes a sumatriptan when the headache or dizziness are very severe. Needs a new prescription     The vertigo is worse looking down and with increase activity.  No globus sensation. No pain with swallowing.  She has a hiatal hernia and GERD.     No pelvic pressure, rectal pressure, hematuria, feet swelling.   There is always neck soreness and especially in the back of the neck  During a shower after doing a lot of active work on the house, both hands were tingling.      Plan:  CTV abdomen and pelvis  Patient wants to stay on topiramate 50mg BID  Sumatriptan 50mg tablets refilled, limit to two days per week.   MRI C-spine, patient has  "some valium for the scan     Interim History 3/29/2024:  CTA/CTV ABDOMEN AND PELVIS 11/14/2023   IMPRESSION:  1. 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  2. May-Thurner anatomy not demonstrated.  3. Enlarged left uterine and ovarian veins. Correlate for pelvic  congestion symptoms.     MR CERVICAL SPINE W/O CONTRAST 11/14/2023  Impression:   Cervical spondylosis greatest at C5-C6 with moderate to severe right and moderate left neural foraminal stenosis and mild spinal canal stenosis.     Still topiramate 50mg BID.  Feels that the headache is the same overall, head pressure mostly. This is worse with activity, and bending over. There are no pelvic congestion. She is postmenopausal.   She is having the tinnitus and dizziness quite often, with activity or with the head down. When severe, she is at a 8/10. On the best days, she's a 2/10.  She is still going to NUCCA, q3 weeks, going longer makes the symptoms come back. She feels that this is very effective.      She continues to have neck stiffness.   1-7-21 had LP at [place] with OP 13.5cmH20.     Exam: Not able to rotate head all the way to 90 degrees. Gets to about 70 degrees. Has a forward head posture. The left shoulder is lower than the right. Chin is slightly turned to the left.      PLAN:  Continue topiramate 50mg BID  Continue NUCCA   Consider doing a pelvic venogram for the possibility of pelvic congestion syndrome---referral placed  Schedule for neurotoxin injection for cervical dystonia    Interim History 10/11/2024:  4-26-24  37.5 units per anterior scalene muscle on the bilateral side(s)  37.5 units per sternocleidomastoid muscle on the bilateral side(s)    8-2-24  50 units per anterior scalene muscle on the bilateral side(s)--0.8 ml total  50 units per sternocleidomastoid muscle on the bilateral side(s)--0.8 ml total    9-23-24 note:  \"I feel that they have helped when I turn my head to the right. I do not feel like I am going to fall asleep nor " "do I get the blurriness in my eyes.   As for the every day dizziness I have been having for the past 10 years, I do not feel this has shown any improvement.\"    Head felt a bit lighter on the higher dose. She would like to stay on the same dose for the next injection. There is some travel planned.  The head turning to the right is so much better because she doesn't experience a sudden sleepiness and the visual blurriness that happens with the head turning.  Turning to the head to the left was always okay.     The chronic daily head pressure has not changed. When she lies down, there is a pulsating in the head. In the morning, she feels okay but the pressure builds up around mid-day and gets worse as the day goes on. She does not have complete spinning vertigo. Bending over and coming back up can cause a feeling a presyncope/blackout feeling. She need sot do everything slowly. The worst head position now is head flexion (brushing teeth, eating, bending over);    She is still seeing NUCCA provider, still benefit, maintenance of every 4 weeks. Does not do well going to 6 weeks.   Not able to hold alignment that long.     CTV Head and Neck 10-9-2021      PLAN:  Consider venography to evaluation IJV compression at C1, induced by head flexion  Continue Topiramate 50mg BID  Continue NUCCA with Dr. Peter,  Next cervical dystonia injection on October 25  Follow-up 6 months    The longitudinal plan of care for the diagnosis(es)/condition(s) as documented were addressed during this visit. Due to the added complexity in care, I will continue to support Rin in the subsequent management and with ongoing continuity of care.    20-minutes were spent in evaluation, counseling, and documentation on the date of service.    "

## 2024-10-11 ENCOUNTER — VIRTUAL VISIT (OUTPATIENT)
Dept: NEUROLOGY | Facility: CLINIC | Age: 59
End: 2024-10-11
Payer: COMMERCIAL

## 2024-10-11 DIAGNOSIS — R42 VERTIGO: ICD-10-CM

## 2024-10-11 DIAGNOSIS — M62.838 MUSCLE SPASM: ICD-10-CM

## 2024-10-11 DIAGNOSIS — I87.1 COMPRESSION OF VEIN: ICD-10-CM

## 2024-10-11 DIAGNOSIS — M43.6 CONTRACTURE OF STERNOCLEIDOMASTOID MUSCLE: ICD-10-CM

## 2024-10-11 DIAGNOSIS — G24.3 CERVICAL DYSTONIA: Primary | ICD-10-CM

## 2024-10-11 PROCEDURE — 99213 OFFICE O/P EST LOW 20 MIN: CPT | Mod: 95 | Performed by: PSYCHIATRY & NEUROLOGY

## 2024-10-11 ASSESSMENT — PAIN SCALES - GENERAL: PAINLEVEL: NO PAIN (0)

## 2024-10-11 NOTE — NURSING NOTE
Current patient location: [address] [place] [address] [address] [place] [address]    Is the patient currently in the state of [place]? YES    Visit mode:VIDEO    If the visit is dropped, the patient can be reconnected by: VIDEO VISIT: Text to cell phone:   Telephone Information:   Mobile [phone]   Mobile [phone]       Will anyone else be joining the visit? NO  (If patient encounters technical issues they should call [phone]    Are changes needed to the allergy or medication list? No    Are refills needed on medications prescribed by this physician? NO    Rooming Documentation:  Questionnaire(s) not pre-assigned    Reason for visit: STEPHANIE YAF

## 2024-10-11 NOTE — LETTER
"10/11/2024       RE: Oral Singh  [address] [place] [address]     Dear Colleague,    Thank you for referring your patient, Oral Singh, to the [facility] [place] at [facility]. Please see a copy of my visit note below.    The patient is being evaluated via a billable video visit.    How would you like to obtain your AVS? MyChart  If the video visit is dropped, the invitation should be resent by: Send to e-mail at: [email or web address]  Will anyone else be joining your video visit? No      Video-Visit Details  Type of service:  Video Visit  Video Start Time:3:33 PM  Video End Time:3:52 PM  Originating Location (pt. 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  She also has chronic daily pressure headaches and neck pain.  She has chronic bilateral upper extremity weakness. There is some chronic swelling in the hands but also in the feet more recently.  Paresthesias in the hand and the headache have been worse on the right side by history but on exam today paresthesias are worse in the left hand.  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      She continues to feel that the arms are more tired than the legs. Dizziness is worse with flexion. Tinnitus is worse with head extension. Headache is worse with extension. There is no throat pressure. No problems swallowing.      Ultrasound on 4-13-21 showed elevated " right internal jugular vein velocities compared to the left but no areas of obstruction. CT venogram on 7-19-21 showed elongated styloid processes at 3.0/3.1cm bilaterally      She still experiences pressure on the top of the head at least once a week that can last 12-24 hours. It is not worse on one side. She is having the lightheadedness episodes throughout the day. She can trigger several a day triggered by fast head movements. They are lasting about 30-minutes to get back to baseline. This is worse with bending the head forward. She also notes a chronic cough since the beginning of the year. She coughs throughout the day. She may wake up in the middle of the night with coughing. There have been no voice changes. No pressure in the throat. No pain when swallowing.      A trial of topiramate escalated to 100mg BID did not help, so she is down to 50mg BID. Symptoms did not get worse when she came down. She has not had physical therapy.     We discussed the 2 imaging studies and the relevance that they may have to her current symptoms.  I think it be worthwhile given there conservative nature to try a course of physical therapy addressing the thoracic outlet and sternocleidomastoid area.  We would also consider escalating her diagnostic testing with a referral to my colleagues in physical medicine and rehab for a muscle block.  She also notes that her lightheadedness is significantly worse with head flexion.  Given enough time past since her last CT scan she is agreeable to try to CT venogram in the head flexed position which I have ordered.  This may help us with choosing all the correct muscles to inject if she does end up being referred for a muscle block.     Plan:  1. Physical therapy for thoracic outlet as well sternocleidomastoid area  2. CT venogram in the neutral and head flexed positions.   3. Consider trial muscle block with PM&R.      Interim History 1-28-22:  CT venogram 10-9-21: Styloid process  measures 2.9 cm on the right.  Styloid process measures 3.5 cm on the left. Mild right and moderate left upper internal jugular venous narrowing with flexion.     She has had 8 sessions of PT through December 2021. She would better on the day of the session but she would revert to her baseline sessions the next day. She didn't see any sustained improvement in the headache or dizziness but she does still do some neck stretches at home because the neck feels better.      She still has a chronic daily pressure headaches mostly in the front of the head but can be at the base of the skull. There have been no spinning very episodes since our last visit. But she continues to experience the lightheadedness mostly in the upright position, sitting and standing but better laying down.      There is still arm weakness but not so much the tingling.   We discussed the plan going forward to escalate to trying muscle blocks and Botox. She is agreeable with this plan.      Interim History 5-27-22:  5-9-22: PROCEDURE NOTE: Anterior Scalene Muscle and Sternocleidomastoid Muscle (SCM) Injection Under Ultrasound Guidance.  Before the procedure, she reported a pain score of 6/10.   After the procedure, she reported a pain score of 6/10.      She was fine in the room, but once she got out into the aranda-way she became very faint which lasted until she sat down. It lasted for about 5-minutes. She was able to drive home. For the rest of the day she felt better than her baseline. The lightheadedness/disorientation/pressure decreased it from 7/10 to 3/10 after two hours and it was 4/10 at 4 hours. She was back at her baseline by the evening. The next morning was the same as other morning. There was no neck tenderness. She is having arm tingling in the right once in a while, not too bad.      Was seen at the [facility] for question of CSF leak. CSF cisternogram from 1-18-21 was reviewed which was negative for a leak. She was a  diagnosis of possible vestibular neuritis, vestibular migraine, and PPPD. Was referred to Jasmin Aceevs in ENT with whom she has an appointment on 12-1-22.      She is still on topiramate 50mg BID. She is done with physical therapy. She is not taking sumatriptan usually, only for super lightheadedness. Her symptoms are particularly bad with head flexion triggering faintness. Head extension is okay. Also, liying on the right side will trigger faintness. There is no globus sensation. No throat pressure.      Plan:  1. Request Botox chronic migraine (failed topiramate and amitriptyline).   2. If insufficiently helpful, would request vascular surgery evaluation for potential left styloid resection.      Interim History 9-23-22:  9-19-22: Bilateral occipital nerve blocks, trigger point injections.  Botox was not approved by insurance.      She has been very dizzy this summer. She has a lot of pressure in the ears, especially the left ear. She went to an ENT and had an MRI yesterday. She was given a diagnosis of vestibular migraine. She has been recommended to have trigger point injections which she had 4 days ago. This has not helped. She has tried a gluten free diet.     She has had really bad headaches in August over her son's wedding. This lasted about 5 days.      She has had no days with no head pressure. There is pressure behind the eyes. She is continuing to feel very off balanced and lightheaded. The lightheadedness is better sitting. When she stands, the lightheadedness is worse. Head movement will make her more lightheaded. Moving the head to the right is worse than moving to the left. Looking down is worse than looking up. Looking down and to the right is the worst. She is better is lying down. It makes the head pressure better. She has not fainted.      The arms and hands both feel weak. The fingers can get tingling. There is no color change. There is no swelling. There is no problem with washing hair.       She is taking topiramate 50mg BID. Of note she had a cisternogram at [facility] on January 2021 that was negative and had a opening pressure of 13.5mg H20. The CSF was normal except for an elevated protein of 71.      Chronic daily migraines with intractable vestibular symptoms. Patient has extracranial venous compression, perhaps as a  of these symptoms.      Plan:   1. 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 Methazolamide titrated to 50mg BID. When at goal dose start tapering the topiramate 25mg by each week.  4. Approval for Botox for chronic migraine with PM&R after clarification that he has failed 2.5 years of amitriptyline and 2.5 years of topiramate)  5. Follow-up in November 18th at 4pm for a video visit     INTERIM HISTORY 11/18/2022:  Botox had been denied by her insurance company despite a [id number] review and several requests. She had already failed over 2-years of topiramate and amitriptyline.      She was able to see Dr. Cristian Guadarrama on 11-9-22 for consideration of whether jugular vein compression could be contributing to her symptoms. She is scheduled for a venogram on 12-9-22. The appointment with [facility] on 12-1-22 has been cancelled.      Her headache and vertigo remained severe with head pressure every day and extreme sensitivity to head movement. There is continued bilateral ear pressure and tinnitus in the right ear because left ear is deaf. The tinnitus can change with bending over or with head movements.      Methazolamide titrated to 50mg BID went fine. She has been able to taper off the topiramate. She does feel more cognitively more foggy even without an increase in head pressure. There is neck tightness and pain symmetric. There is sometimes pain behind the right ear, where the styloid is.      She has no other major events. No falls. No numbness, tingling, or weakness in the  hand.      PLAN:  1. Methazolamide 50mg BID tablets  2. Aware venogram for diagnosis of possible SCM syndrome     Interim History 4/14/2023:  12-9-22:  Venogram with Dr. Guadarrama: Normal     She still has daily dizziness and light headedness. She is seeing Duncan Peter for NUCCA treatment for C1 adjustment. She feels better right after the adjustment but it lasts maybe 24 hrs at best. It does help with both the headache and the lightheadedness. She was doing weekly for 4 weeks but now she is doing every other week. She last did PT at the end of 2021.      She is on methazolamide 50mg BID is the same as topiramate as 50mg BID. Both had the same efficacy for the headache but topiramate is much cheaper.     She has stopped working since our last appointment due to symptoms.  She was previously on amitriptyline and topiramate.   She does continues to have a lot of neck soreness, especially under the right ear.   Had an episode of severe rocking vertigo after driving in a car.      Plan:  1. Taper methazolamide and titrate up topiramate  2. Trial of Emgality for intractable migraines  3. 1mg of diazepam for flying, avoid alcohol.  4. Neck protection during flying  5. Continue maintenance NUCCA   6. Add posterior neck strengthening exercises     Interim History 10/27/2023:  Is on topiramate 50mg BID, which is better than the methazolamide in terms of side effects of nausea and fatigue.   The Emgality was taken for 2 rounds, no change in headaches but also no side effects. The headaches are better lying down.   She is continuing to do NUCCA, with Dr. Peter, helps for about 2 days. Once every 3 weeks.      She takes a sumatriptan when the headache or dizziness are very severe. Needs a new prescription     The vertigo is worse looking down and with increase activity.  No globus sensation. No pain with swallowing.  She has a hiatal hernia and GERD.     No pelvic pressure, rectal pressure, hematuria, feet swelling.   There is  always neck soreness and especially in the back of the neck  During a shower after doing a lot of active work on the house, both hands were tingling.      Plan:  CTV abdomen and pelvis  Patient wants to stay on topiramate 50mg BID  Sumatriptan 50mg tablets refilled, limit to two days per week.   MRI C-spine, patient has some valium for the scan     Interim History 3/29/2024:  CTA/CTV ABDOMEN AND PELVIS 11/14/2023   IMPRESSION:  1. 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  2. May-Thurner anatomy not demonstrated.  3. Enlarged left uterine and ovarian veins. Correlate for pelvic  congestion symptoms.     MR CERVICAL SPINE W/O CONTRAST 11/14/2023  Impression:   Cervical spondylosis greatest at C5-C6 with moderate to severe right and moderate left neural foraminal stenosis and mild spinal canal stenosis.     Still topiramate 50mg BID.  Feels that the headache is the same overall, head pressure mostly. This is worse with activity, and bending over. There are no pelvic congestion. She is postmenopausal.   She is having the tinnitus and dizziness quite often, with activity or with the head down. When severe, she is at a 8/10. On the best days, she's a 2/10.  She is still going to NUCCA, q3 weeks, going longer makes the symptoms come back. She feels that this is very effective.      She continues to have neck stiffness.   1-7-21 had LP at [place] with OP 13.5cmH20.     Exam: Not able to rotate head all the way to 90 degrees. Gets to about 70 degrees. Has a forward head posture. The left shoulder is lower than the right. Chin is slightly turned to the left.      PLAN:  Continue topiramate 50mg BID  Continue NUCCA   Consider doing a pelvic venogram for the possibility of pelvic congestion syndrome---referral placed  Schedule for neurotoxin injection for cervical dystonia    Interim History 10/11/2024:  4-26-24  37.5 units per anterior scalene muscle on the bilateral side(s)  37.5 units per sternocleidomastoid muscle  "on the bilateral side(s)    8-2-24  50 units per anterior scalene muscle on the bilateral side(s)--0.8 ml total  50 units per sternocleidomastoid muscle on the bilateral side(s)--0.8 ml total    9-23-24 note:  \"I feel that they have helped when I turn my head to the right. I do not feel like I am going to fall asleep nor do I get the blurriness in my eyes.   As for the every day dizziness I have been having for the past 10 years, I do not feel this has shown any improvement.\"    Head felt a bit lighter on the higher dose. She would like to stay on the same dose for the next injection. There is some travel planned.  The head turning to the right is so much better because she doesn't experience a sudden sleepiness and the visual blurriness that happens with the head turning.  Turning to the head to the left was always okay.     The chronic daily head pressure has not changed. When she lies down, there is a pulsating in the head. In the morning, she feels okay but the pressure builds up around mid-day and gets worse as the day goes on. She does not have complete spinning vertigo. Bending over and coming back up can cause a feeling a presyncope/blackout feeling. She need sot do everything slowly. The worst head position now is head flexion (brushing teeth, eating, bending over);    She is still seeing NUCCA provider, still benefit, maintenance of every 4 weeks. Does not do well going to 6 weeks.   Not able to hold alignment that long.     CTV Head and Neck 10-9-2021      PLAN:  Consider venography to evaluation IJV compression at C1, induced by head flexion  Continue Topiramate 50mg BID  Continue NUCCA with Dr. Peter,  Next cervical dystonia injection on October 25  Follow-up 6 months    The longitudinal plan of care for the diagnosis(es)/condition(s) as documented were addressed during this visit. Due to the added complexity in care, I will continue to support Rin in the subsequent management and with ongoing " continuity of care.    20-minutes were spent in evaluation, counseling, and documentation on the date of service.      Again, thank you for allowing me to participate in the care of your patient.      Sincerely,    Toshia CAMARILLO Cha, MD

## 2024-10-16 ENCOUNTER — TELEPHONE (OUTPATIENT)
Dept: NEUROLOGY | Facility: CLINIC | Age: 59
End: 2024-10-16
Payer: COMMERCIAL

## 2024-10-16 NOTE — TELEPHONE ENCOUNTER
Patient confirmed scheduled appointment:  Date: 4/18/2025  Time: 2:30 pm  Visit type: Return Neurology  Provider: Stacia  Location: virtual  Testing/imaging: NA  Additional notes: 6 mo follow up    Yohana Whitlock on 10/16/2024 at 4:52 PM

## 2024-10-21 NOTE — PROGRESS NOTES
"PROCEDURE NOTE: Intramuscular Abobotulinum Toxin-A (Dysport) Injection Under Ultrasound Guidance    PROCEDURE DATE: 10/25/2024    PATIENT NAME: Oral Singh  YOB: 1965    ATTENDING PHYSICIAN: Toshia CAMARILLO Cha, MD    PRIOR NEUROTOXIN  4-26-24  37.5 units per anterior scalene muscle on the bilateral side(s)  37.5 units per sternocleidomastoid muscle on the bilateral side(s)  Didn't notice much improvement in the dizziness.   There was a little difficulty with neck flexion. No difficulty with getting head off the bed. That wore of.   Noted an episode of white-out of lower part of both visual fields when she had been turning the head to the right. This was followed by some sleepiness. This does not happen turning to the left. There was no pain in the face when this happens. This can also happen lying on the right side, she gets sleepier faster. There is also right sided tinnitus when lying on the right side (deaf in the left ear).     8-2-24  50 units per anterior scalene muscle on the bilateral side(s)--0.8 ml total  50 units per sternocleidomastoid muscle on the bilateral side(s)--0.8 ml total  9-23-24 note:  \"I feel that they have helped when I turn my head to the right. I do not feel like I am going to fall asleep nor do I get the blurriness in my eyes.   As for the every day dizziness I have been having for the past 10 years, I do not feel this has shown any improvement.\"  Head felt a bit lighter on the higher dose. She would like to stay on the same dose for the next injection. There is some travel planned.  The head turning to the right is so much better because she doesn't experience a sudden sleepiness and the visual blurriness that happens with the head turning.  Turning to the head to the left was always okay.     PREOPERATIVE DIAGNOSIS:   1. Cervical dystonia    2. Contracture of sternocleidomastoid muscle       POSTOPERATIVE DIAGNOSIS: same    PROCEDURE PERFORMED: Bilateral " Sternocleidomastoid and Anterior Scalene Muscle Abobotulinum Toxin-A (Dysport) Injection Under Ultrasound Guidance    ULTRASOUND WAS USED.     INDICATIONS FOR THE PROCEDURE:  Oral Singh is a 59 year old female who presents with cervical dystonia.    PROCEDURE AND FINDINGS:  She was greeted in the clinic. The risk, benefits and alternatives to the procedure were again reviewed with her and informed consent was obtained and the patient agreed to proceed. A time-out was performed. Following review alternatives, benefits and risks, the procedure was carried out under sterile prep with sterile gel. The use of direct sonographic guidance was used to ensure accurate placement of the needle (rather than non-guided injection) and required to minimize the risk of bleeding or injury to nearby neurovascular structures. Images were recorded and stored.     A 15-6MHz ultrasound transducer was used to visualize the relevant structures and determine the optimal needle path for the procedure. A 27 gauge 1.5 inch needle was advanced utilizing an out-of-plane approach, under continuous ultrasound guidance to the sternocleidomastoid and anterior scalene muscle(s) on the bilateral side(s). The tip of the needle was visualized throughout the procedure. The remainder of the single-use vials were discarded.      300 units of abobotulinum toxin was diluted to 20 units/mL of preservative free saline (300 units diluted in 2.4ml saline). The following muscles were injected:    50 units per anterior scalene muscle on the bilateral side(s)-0.8 ml total  50 units per sternocleidomastoid muscle on the bilateral side(s)-0.8 ml total--divided into two injections of 25 units each.   ____________________________________    200 total units used.   The remainder (100 units) of the single-use vials were discarded.      She tolerated the procedure well, was discharged home in stable condition.     Follow-up will be in clinic      COMPLICATIONS:  None    COMMENTS:   Plan for CTV with head right and left  Referral to Dr. Youssef in IR for C1 level IJV compression venogram

## 2024-10-25 ENCOUNTER — OFFICE VISIT (OUTPATIENT)
Dept: NEUROLOGY | Facility: CLINIC | Age: 59
End: 2024-10-25
Payer: COMMERCIAL

## 2024-10-25 DIAGNOSIS — R42 VERTIGO: ICD-10-CM

## 2024-10-25 DIAGNOSIS — M43.6 CONTRACTURE OF STERNOCLEIDOMASTOID MUSCLE: ICD-10-CM

## 2024-10-25 DIAGNOSIS — G24.3 CERVICAL DYSTONIA: Primary | ICD-10-CM

## 2024-10-25 DIAGNOSIS — I87.1 COMPRESSION OF VEIN: ICD-10-CM

## 2024-10-25 DIAGNOSIS — M54.2 NECK PAIN: ICD-10-CM

## 2024-10-25 DIAGNOSIS — M24.20 EAGLE'S SYNDROME: ICD-10-CM

## 2024-10-25 PROCEDURE — 64616 CHEMODENERV MUSC NECK DYSTON: CPT | Mod: 50 | Performed by: PSYCHIATRY & NEUROLOGY

## 2024-10-25 PROCEDURE — 76942 ECHO GUIDE FOR BIOPSY: CPT | Performed by: PSYCHIATRY & NEUROLOGY

## 2024-10-25 NOTE — LETTER
"10/25/2024       RE: Oral Singh  [address] [place] [address]     Dear Colleague,    Thank you for referring your patient, Oral Singh, to the [facility] NEUROLOGY [facility] [place] at [facility]. Please see a copy of my visit note below.    PROCEDURE NOTE: Intramuscular Abobotulinum Toxin-A (Dysport) Injection Under Ultrasound Guidance    PROCEDURE DATE: 10/25/2024    PATIENT NAME: Oral Singh  YOB: 1965    ATTENDING PHYSICIAN: Toshia CAMARILLO Cha, MD    PRIOR NEUROTOXIN  4-26-24  37.5 units per anterior scalene muscle on the bilateral side(s)  37.5 units per sternocleidomastoid muscle on the bilateral side(s)  Didn't notice much improvement in the dizziness.   There was a little difficulty with neck flexion. No difficulty with getting head off the bed. That wore of.   Noted an episode of white-out of lower part of both visual fields when she had been turning the head to the right. This was followed by some sleepiness. This does not happen turning to the left. There was no pain in the face when this happens. This can also happen lying on the right side, she gets sleepier faster. There is also right sided tinnitus when lying on the right side (deaf in the left ear).     8-2-24  50 units per anterior scalene muscle on the bilateral side(s)--0.8 ml total  50 units per sternocleidomastoid muscle on the bilateral side(s)--0.8 ml total  9-23-24 note:  \"I feel that they have helped when I turn my head to the right. I do not feel like I am going to fall asleep nor do I get the blurriness in my eyes.   As for the every day dizziness I have been having for the past 10 years, I do not feel this has shown any improvement.\"  Head felt a bit lighter on the higher dose. She would like to stay on the same dose for the next injection. There is some travel planned.  The head turning to the right is so much better because she doesn't " experience a sudden sleepiness and the visual blurriness that happens with the head turning.  Turning to the head to the left was always okay.     PREOPERATIVE DIAGNOSIS:   1. Cervical dystonia    2. Contracture of sternocleidomastoid muscle       POSTOPERATIVE DIAGNOSIS: same    PROCEDURE PERFORMED: Bilateral Sternocleidomastoid and Anterior Scalene Muscle Abobotulinum Toxin-A (Dysport) Injection Under Ultrasound Guidance    ULTRASOUND WAS USED.     INDICATIONS FOR THE PROCEDURE:  Oral Singh is a 59 year old female who presents with cervical dystonia.    PROCEDURE AND FINDINGS:  She was greeted in the clinic. The risk, benefits and alternatives to the procedure were again reviewed with her and informed consent was obtained and the patient agreed to proceed. A time-out was performed. Following review alternatives, benefits and risks, the procedure was carried out under sterile prep with sterile gel. The use of direct sonographic guidance was used to ensure accurate placement of the needle (rather than non-guided injection) and required to minimize the risk of bleeding or injury to nearby neurovascular structures. Images were recorded and stored.     A 15-6MHz ultrasound transducer was used to visualize the relevant structures and determine the optimal needle path for the procedure. A 27 gauge 1.5 inch needle was advanced utilizing an out-of-plane approach, under continuous ultrasound guidance to the sternocleidomastoid and anterior scalene muscle(s) on the bilateral side(s). The tip of the needle was visualized throughout the procedure. The remainder of the single-use vials were discarded.      300 units of abobotulinum toxin was diluted to 20 units/mL of preservative free saline (300 units diluted in 2.4ml saline). The following muscles were injected:    50 units per anterior scalene muscle on the bilateral side(s)-0.8 ml total  50 units per sternocleidomastoid muscle on the bilateral side(s)-0.8 ml  total--divided into two injections of 25 units each.   ____________________________________    200 total units used.   The remainder (100 units) of the single-use vials were discarded.      She tolerated the procedure well, was discharged home in stable condition.     Follow-up will be in clinic      COMPLICATIONS: None    COMMENTS:   Plan for CTV with head right and left  Referral to Dr. Youssef in IR for C1 level IJV compression venogram                               Again, thank you for allowing me to participate in the care of your patient.      Sincerely,    Toshia CAMARILLO Cha, MD

## 2024-10-28 DIAGNOSIS — I87.1 COMPRESSION OF VEIN: Primary | ICD-10-CM

## 2025-01-06 ENCOUNTER — MYC MEDICAL ADVICE (OUTPATIENT)
Dept: VASCULAR SURGERY | Facility: CLINIC | Age: 60
End: 2025-01-06
Payer: COMMERCIAL

## 2025-01-06 DIAGNOSIS — I87.1 COMPRESSION OF VEIN: Primary | ICD-10-CM

## 2025-01-06 DIAGNOSIS — G43.719 INTRACTABLE CHRONIC MIGRAINE WITHOUT AURA AND WITHOUT STATUS MIGRAINOSUS: ICD-10-CM

## 2025-01-06 RX ORDER — METHYLPREDNISOLONE 32 MG/1
TABLET ORAL
Qty: 2 TABLET | Refills: 0 | Status: SHIPPED | OUTPATIENT
Start: 2025-01-06

## 2025-01-09 RX ORDER — TOPIRAMATE 50 MG/1
50 TABLET, FILM COATED ORAL 2 TIMES DAILY
Qty: 60 TABLET | Refills: 11 | Status: SHIPPED | OUTPATIENT
Start: 2025-01-09

## 2025-01-09 NOTE — TELEPHONE ENCOUNTER
RX Authorization    Medication: topiramate (TOPAMAX) 50 MG tablet     Date last refill ordered: 04/26/2024    Quantity ordered: 60    # refills: 11     Date of last clinic visit with ordering provider: 10/11/2024    Date of next clinic visit with ordering provider: 04/18/2025

## 2025-01-12 ENCOUNTER — HEALTH MAINTENANCE LETTER (OUTPATIENT)
Age: 60
End: 2025-01-12

## 2025-01-29 RX ORDER — HEPARIN SODIUM 200 [USP'U]/100ML
1 INJECTION, SOLUTION INTRAVENOUS EVERY 5 MIN PRN
OUTPATIENT
Start: 2025-01-29

## 2025-01-29 RX ORDER — SODIUM CHLORIDE 9 MG/ML
INJECTION, SOLUTION INTRAVENOUS CONTINUOUS
OUTPATIENT
Start: 2025-01-29

## 2025-01-30 ENCOUNTER — APPOINTMENT (OUTPATIENT)
Dept: INTERVENTIONAL RADIOLOGY/VASCULAR | Facility: CLINIC | Age: 60
End: 2025-01-30
Attending: STUDENT IN AN ORGANIZED HEALTH CARE EDUCATION/TRAINING PROGRAM
Payer: COMMERCIAL

## 2025-01-30 ENCOUNTER — HOSPITAL ENCOUNTER (OUTPATIENT)
Facility: CLINIC | Age: 60
Discharge: HOME | End: 2025-01-30
Attending: STUDENT IN AN ORGANIZED HEALTH CARE EDUCATION/TRAINING PROGRAM | Admitting: STUDENT IN AN ORGANIZED HEALTH CARE EDUCATION/TRAINING PROGRAM
Payer: COMMERCIAL

## 2025-01-30 ENCOUNTER — APPOINTMENT (OUTPATIENT)
Dept: GENERAL RADIOLOGY | Facility: CLINIC | Age: 60
End: 2025-01-30
Attending: STUDENT IN AN ORGANIZED HEALTH CARE EDUCATION/TRAINING PROGRAM
Payer: COMMERCIAL

## 2025-01-30 VITALS
SYSTOLIC BLOOD PRESSURE: 112 MMHG | TEMPERATURE: 97.7 F | HEART RATE: 71 BPM | OXYGEN SATURATION: 99 % | DIASTOLIC BLOOD PRESSURE: 88 MMHG

## 2025-01-30 DIAGNOSIS — I87.1 COMPRESSION OF VEIN: ICD-10-CM

## 2025-01-30 PROCEDURE — 272N000566 HC SHEATH CR3

## 2025-01-30 PROCEDURE — 76937 US GUIDE VASCULAR ACCESS: CPT | Mod: 26 | Performed by: STUDENT IN AN ORGANIZED HEALTH CARE EDUCATION/TRAINING PROGRAM

## 2025-01-30 PROCEDURE — 75860 VEIN X-RAY NECK: CPT

## 2025-01-30 PROCEDURE — 36012 PLACE CATHETER IN VEIN: CPT | Performed by: STUDENT IN AN ORGANIZED HEALTH CARE EDUCATION/TRAINING PROGRAM

## 2025-01-30 PROCEDURE — 36012 PLACE CATHETER IN VEIN: CPT | Mod: 50

## 2025-01-30 PROCEDURE — 76937 US GUIDE VASCULAR ACCESS: CPT

## 2025-01-30 PROCEDURE — 75870 VEIN X-RAY SKULL: CPT | Mod: 50,XU

## 2025-01-30 PROCEDURE — 75870 VEIN X-RAY SKULL: CPT | Mod: 26 | Performed by: STUDENT IN AN ORGANIZED HEALTH CARE EDUCATION/TRAINING PROGRAM

## 2025-01-30 PROCEDURE — C1887 CATHETER, GUIDING: HCPCS

## 2025-01-30 PROCEDURE — 272N000504 HC NEEDLE CR4

## 2025-01-30 PROCEDURE — 255N000002 HC RX 255 OP 636: Performed by: STUDENT IN AN ORGANIZED HEALTH CARE EDUCATION/TRAINING PROGRAM

## 2025-01-30 PROCEDURE — 36013 PLACE CATHETER IN ARTERY: CPT | Performed by: STUDENT IN AN ORGANIZED HEALTH CARE EDUCATION/TRAINING PROGRAM

## 2025-01-30 PROCEDURE — 75860 VEIN X-RAY NECK: CPT | Mod: 26 | Performed by: STUDENT IN AN ORGANIZED HEALTH CARE EDUCATION/TRAINING PROGRAM

## 2025-01-30 PROCEDURE — 75860 VEIN X-RAY NECK: CPT | Mod: XS

## 2025-01-30 PROCEDURE — C1769 GUIDE WIRE: HCPCS

## 2025-01-30 PROCEDURE — 250N000009 HC RX 250: Performed by: STUDENT IN AN ORGANIZED HEALTH CARE EDUCATION/TRAINING PROGRAM

## 2025-01-30 RX ORDER — IODIXANOL 320 MG/ML
1-150 INJECTION, SOLUTION INTRAVASCULAR ONCE
Status: COMPLETED | OUTPATIENT
Start: 2025-01-30 | End: 2025-01-30

## 2025-01-30 RX ADMIN — IODIXANOL 45 ML: 320 INJECTION, SOLUTION INTRAVASCULAR at 13:40

## 2025-01-30 RX ADMIN — LIDOCAINE HYDROCHLORIDE 0.5 ML: 10 INJECTION, SOLUTION EPIDURAL; INFILTRATION; INTRACAUDAL; PERINEURAL at 13:39

## 2025-01-30 ASSESSMENT — ACTIVITIES OF DAILY LIVING (ADL)
ADLS_ACUITY_SCORE: 41

## 2025-01-30 NOTE — IR NOTE
Interventional Radiology Intra-procedural Nursing Note    Patient Name: Oral Singh  Medical Record Number: [id number]  Today's Date: January 30, 2025    Start Time: 1250  End of procedure time: 140pm  Procedure: cerebral venogram    Other Notes: no sedation    Shelly Cobian RN

## 2025-01-30 NOTE — PROCEDURES
Brief Op Note    Name: Oral Singh   Admission Date: 2025  MRN: [id number]    Attending Physician: Dr. Rene Youssef  Resident Physician: N/A  Advanced Practice Provider: N/A    Sex: Female  : 1965   Age: 59 year old    Diagnosis and Procedure  Pre-Operative Diagnosis: Cervical dystonia  Post-Operative Diagnosis: Same    Procedure:  Cerebral venogram  Anesthesia: None    Procedure Data  Technique: Ultrasound and Fluoroscopy  EBL: < 5 mL  Specimen Submitted: None  Complications: None  Drains: None    Condition/Disposition: Stable into care of anesthesia/sedation team; full report to follow.    For the final procedural/operative note, please look under: Chart Review > Imaging    Rene Youssef MD

## 2025-01-30 NOTE — DISCHARGE INSTRUCTIONS
Discharge Instructions after Peripheral Venogram  _____________________________________    Patient Name: Oral Singh  Today's Date: January 30, 2025    Today you had an angiogram with Dr. Youssef      For 24 hours  Drink extra fluids. Eat a diet low in saturated fat. No smoking or alcohol.  Keep the bandage on. Check the puncture site every 2 to 3 hours while awake.  The day after your exam, you may take a shower. Wait five days before taking a bath.  For 3 days  Avoid hard activity such as lifting, pushing or exercise. This will help prevent bleeding.  Keep the puncture site clean and dry. Keep it covered with a Band-aid until it heals.  Do not use lotion or powder near the site.  Keep drinking extra fluids.  For 5 days: Do not swim or take a bath.    Medicines:  Take your other medicines, including blood thinners, unless your doctor tells you not to.    Follow-up:  None      If you have problems or questions  Call your doctor or radiology clinic if you have:  A fever of 101  (38.3  C) or higher (under the tongue)  Increased pain, redness or a hard lump at the puncture site.  Your arm or leg feels cool, numb or changes color.  New pain in the back or lower belly that you cannot relieve with Tylenol.  If you have bleeding or increased swelling, lie down and press firmly on the site. If it s a small amount, call your clinic. If it s a large amount, call [phone], even if the bleeding stops.    If you have questions or your original symptoms do not improve, call:    [facility]: [phone]. Ask for the radiologist on call.   [facility]: [phone]. Ask for the radiologist on call.

## 2025-01-31 ASSESSMENT — ACTIVITIES OF DAILY LIVING (ADL)
ADLS_ACUITY_SCORE: 41

## 2025-02-01 ASSESSMENT — ACTIVITIES OF DAILY LIVING (ADL)
ADLS_ACUITY_SCORE: 41

## 2025-02-02 ASSESSMENT — ACTIVITIES OF DAILY LIVING (ADL)
ADLS_ACUITY_SCORE: 41

## 2025-02-03 ASSESSMENT — ACTIVITIES OF DAILY LIVING (ADL)
ADLS_ACUITY_SCORE: 41

## 2025-04-12 ENCOUNTER — MYC REFILL (OUTPATIENT)
Dept: DERMATOLOGY | Facility: CLINIC | Age: 60
End: 2025-04-12
Payer: COMMERCIAL

## 2025-04-12 DIAGNOSIS — N76.3 ZOON'S VULVITIS: ICD-10-CM

## 2025-04-12 DIAGNOSIS — L40.9 PSORIASIS: ICD-10-CM

## 2025-04-12 NOTE — TELEPHONE ENCOUNTER
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         Follow-up: 6 month(s) in-person, or earlier for new or changing lesions

## 2025-04-14 RX ORDER — TACROLIMUS 1 MG/G
OINTMENT TOPICAL 2 TIMES DAILY
Qty: 60 G | Refills: 2 | Status: SHIPPED | OUTPATIENT
Start: 2025-04-14

## 2025-04-14 NOTE — TELEPHONE ENCOUNTER
Medication Requested: tacrolimus (PROTOPIC) 0.1 % external ointment   ---------------------  Last Written Prescription: 1/29/2024 Disp:60 g R:11  ---------------------  Last Visit Date: 7/8/2024  [facility]  Future Visit Date: 6/2/2025  ----------------------    [x]  Refill decision: Medication unable to be refilled by RN due to: Failed Derm protocol > 6 months from last visit (process #2/Derm protocol).     [x]    Pt not seen within past 6 months        Request from pharmacy:  Requested Prescriptions   Pending Prescriptions Disp Refills    tacrolimus (PROTOPIC) 0.1 % external ointment 60 g 11     Sig: Apply topically 2 times daily.       Topical Steroids and Nonsteroidals Protocol Passed - 4/14/2025  2:50 PM        Passed - Patient is age 6 or older        Passed - Authorizing prescriber's most recent note related to this medication read.     If refill request is for ophthalmic use, please forward request to provider for approval.          Passed - High potency steroid not ordered        Passed - Medication is active on med list and the sig matches. RN to manually verify dose and sig if red X/fail.     If the protocol passes (green check), you do not need to verify med dose and sig.    A prescription matches if they are the same clinical intention.    For Example: once daily and every morning are the same.    The protocol can not identify upper and lower case letters as matching and will fail.     For Example: Take 1 tablet (50 mg) by mouth daily     TAKE 1 TABLET (50 MG) BY MOUTH DAILY    For all fails (red x), verify dose and sig.    If the refill does match what is on file, the RN can still proceed to approve the refill request.       If they do not match, route to the appropriate provider.             Passed - Recent (12 mo) or future (90 days) visit within the authorizing provider's specialty     The patient must have completed an in-person or virtual visit within the past 12 months or has a future visit scheduled  within the next 90 days with the authorizing provider s specialty.  Urgent care and e-visits do not qualify as an office visit for this protocol.

## 2025-04-21 DIAGNOSIS — G24.3 CERVICAL DYSTONIA: Primary | ICD-10-CM

## 2025-04-30 ENCOUNTER — TELEPHONE (OUTPATIENT)
Dept: NEUROLOGY | Facility: CLINIC | Age: 60
End: 2025-04-30
Payer: COMMERCIAL

## 2025-04-30 NOTE — TELEPHONE ENCOUNTER
Patient confirmed scheduled appointment:  Date: 10/24/25  Time: 3pm  Visit type: Return Neurology  Provider: Stacia  Location: Virtual  Testing/imaging: NA  Additional notes: 6 month follow up    Gris Schulz on 4/30/2025 at 10:02 AM

## 2025-05-13 ENCOUNTER — MYC REFILL (OUTPATIENT)
Dept: VASCULAR SURGERY | Facility: CLINIC | Age: 60
End: 2025-05-13
Payer: COMMERCIAL

## 2025-05-13 DIAGNOSIS — G43.719 INTRACTABLE CHRONIC MIGRAINE WITHOUT AURA AND WITHOUT STATUS MIGRAINOSUS: ICD-10-CM

## 2025-05-13 RX ORDER — TOPIRAMATE 50 MG/1
50 TABLET, FILM COATED ORAL 2 TIMES DAILY
Qty: 60 TABLET | Refills: 11 | Status: SHIPPED | OUTPATIENT
Start: 2025-05-13

## 2025-05-13 NOTE — TELEPHONE ENCOUNTER
Neuroscience Clinic Task Note    TASK    Neurology Rx Refill  Medication topiramate (TOPAMAX) 50 MG tablet    Dose 1 tablet (50 mg) by mouth 2 times daily    Last refill ordered (m/d/y) 01/09/2025   Last quantity ordered 60   Last # refills 11   Last clinic visit with ordering provider (m/d/y) 04/18/2025   Next clinic visit with ordering provider (m/d/y) 10/24/2025   All pertinent protocol data (lab date/result)    Pertinent information from patient's message        FOLLOW-UP      ADDITIONAL COMMENTS  Pt requested a pharmacy change     Khloe Judd

## 2025-06-02 ENCOUNTER — OFFICE VISIT (OUTPATIENT)
Dept: DERMATOLOGY | Facility: CLINIC | Age: 60
End: 2025-06-02
Attending: DERMATOLOGY
Payer: COMMERCIAL

## 2025-06-02 DIAGNOSIS — N76.3 ZOON'S VULVITIS: ICD-10-CM

## 2025-06-02 DIAGNOSIS — D22.9 MULTIPLE NEVI: Primary | ICD-10-CM

## 2025-06-02 RX ORDER — TACROLIMUS 1 MG/G
OINTMENT TOPICAL 2 TIMES DAILY
Qty: 60 G | Refills: 2 | Status: SHIPPED | OUTPATIENT
Start: 2025-06-02

## 2025-06-02 ASSESSMENT — PAIN SCALES - GENERAL: PAINLEVEL_OUTOF10: NO PAIN (0)

## 2025-06-02 NOTE — NURSING NOTE
Dermatology Rooming Note    Oral Singh's goals for this visit include:   Chief Complaint   Patient presents with    Skin Check     FBSE, no new spots of concern     Coy Howard - EMT

## 2025-06-02 NOTE — PROGRESS NOTES
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  Will continue to monitor.  May need field treatment at some time in future    # Nevi- none with atypia    # Zoon's vulvitis- controlled.    - Continue Tacrolimus 0.1% ointment once to twice daily   - Urethral erythema with friable tissue.  Predated diagnosis of Zoon's (present for over 10 years).  Did see outside Urologist and Gyn with no diagnosis.  Still bleeds on occasion.  No biopsy.  Will refer to Urology.     # Benign findings: lentigo, cherry angiomas, seborrheic keratoses, cyst of left thigh and lipoma right arm      Follow-up: 1 year(s) in-person, or earlier for new or changing lesions    Staff:     Quynh Ag MD  ____________________________________________    CC: Skin Check (FBSE, no new spots of concern)    HPI:  Ms. Oral Singh is a(n) 59 year old female who presents today as a return patient for a skin check.  Zoon's is controlled.  She did try to stop the tacrolimus but then flared.  Now controlled again.  Notes ongoing red friable skin at opening of urethra.  Present for over a decade with no diagnosis.      Patient is otherwise feeling well, without additional skin concerns.     Labs Reviewed:  N/A    Physical Exam:  Vitals: There were no vitals taken for this visit.  SKIN: Full skin, which includes the head/face, both arms, chest, back, abdomen,both legs, genitalia and/or groin buttocks, digits and/or nails, was examined. Patient deferred a nurse chaperone  - pink patches at urethral opening  - nevi with no atypia on dermoscopy  - waxy stuck on papules  - cherry  angiomas  - lentigo  - cyst left  thigh  - mobile fatty nodule right arm  - mild actinic patches bilateral cheeks.   - No other lesions of concern on areas examined.     Medications:  Current Outpatient Medications   Medication Sig Dispense Refill    diazepam (VALIUM) 2 MG tablet Take 0.5 tablets (1 mg) by mouth every 6 hours as needed for muscle spasms (for flying) 10 tablet 0    DULoxetine (CYMBALTA) 30 MG capsule       estradiol (VAGIFEM) 10 MCG TABS vaginal tablet Place 10 mcg vaginally      fish oil-omega-3 fatty acids 1000 MG capsule Take 2 g by mouth      furosemide (LASIX) 20 MG tablet Take 1/2-1 tablet every 6 hours as needed for swelling. Avoid taking within 4 hours of bedtime. 60 tablet 3    losartan (COZAAR) 25 MG tablet Take 25 mg by mouth      SUMAtriptan (IMITREX) 50 MG tablet Take 1 tablet (50 mg) by mouth as needed for migraine 18 tablet 4    tacrolimus (PROTOPIC) 0.1 % external ointment Apply topically 2 times daily. 60 g 2    topiramate (TOPAMAX) 50 MG tablet Take 1 tablet (50 mg) by mouth 2 times daily. 60 tablet 11    traZODone (DESYREL) 50 MG tablet At Bedtime       Current Facility-Administered Medications   Medication Dose Route Frequency Provider Last Rate Last Admin    botulinum toxin type A (DYSPORT) injection 300 Units  300 Units Intramuscular Q90 Days    250 Units at 04/25/25 1323      Past Medical History:   Patient Active Problem List   Diagnosis    Asthma    Condyloma acuminatum    H/O arthroscopic knee surgery    Impingement syndrome of right shoulder    Insomnia, idiopathic    Major depressive disorder, single episode, mild    Migraine variant    Congenital mitral valve prolapse    Nonspecific immunological findings    Osteoarthritis of multiple joints    Vertigo     Past Medical History:   Diagnosis Date    Arthritis        CC Quynh Ag MD  [address]  [place] [address] on close of this encounter.

## 2025-06-02 NOTE — LETTER
6/2/2025       RE: Oral Singh  [address] [place] [address]     Dear Colleague,    Thank you for referring your patient, Oral Singh, to the [facility] DERMATOLOGY [facility]IC [place] at [facility]. Please see a copy of my visit note below.    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 Dermatology 4/12/23  - Tacrolimus 0.1% ointment  - s/p antifungals, IL kenalog, clobetasol ointment without improvement    ____________________________________________    Assessment & Plan:     # Mild actinic damage of bilateral cheeks.  Will continue to monitor.  May need field treatment at some time in future    # Nevi- none with atypia    # Zoon's vulvitis- controlled.    - Continue Tacrolimus 0.1% ointment once to twice daily   - Urethral erythema with friable tissue.  Predated diagnosis of Zoon's (present for over 10 years).  Did see outside Urologist and Gyn with no diagnosis.  Still bleeds on occasion.  No biopsy.  Will refer to Urology.     # Benign findings: lentigo, cherry angiomas, seborrheic keratoses, cyst of left thigh and lipoma right arm      Follow-up: 1 year(s) in-person, or earlier for new or changing lesions    Staff:     Quynh Ag MD  ____________________________________________    CC: Skin Check (FBSE, no new spots of concern)    HPI:  Ms. Oral Singh is a(n) 59 year old female who presents today as a return patient for a skin check.  Zoon's is controlled.  She did try to stop the tacrolimus but then flared.  Now controlled again.  Notes ongoing red friable skin at opening of urethra.  Present for over a decade with no diagnosis.      Patient is otherwise feeling well, without additional skin concerns.     Labs Reviewed:  N/A    Physical Exam:  Vitals: There were no vitals taken for this visit.  SKIN: Full skin,  which includes the head/face, both arms, chest, back, abdomen,both legs, genitalia and/or groin buttocks, digits and/or nails, was examined. Patient deferred a nurse chaperone  - pink patches at urethral opening  - nevi with no atypia on dermoscopy  - waxy stuck on papules  - cherry  angiomas  - lentigo  - cyst left thigh  - mobile fatty nodule right arm  - mild actinic patches bilateral cheeks.   - No other lesions of concern on areas examined.     Medications:  Current Outpatient Medications   Medication Sig Dispense Refill     diazepam (VALIUM) 2 MG tablet Take 0.5 tablets (1 mg) by mouth every 6 hours as needed for muscle spasms (for flying) 10 tablet 0     DULoxetine (CYMBALTA) 30 MG capsule        estradiol (VAGIFEM) 10 MCG TABS vaginal tablet Place 10 mcg vaginally       fish oil-omega-3 fatty acids 1000 MG capsule Take 2 g by mouth       furosemide (LASIX) 20 MG tablet Take 1/2-1 tablet every 6 hours as needed for swelling. Avoid taking within 4 hours of bedtime. 60 tablet 3     losartan (COZAAR) 25 MG tablet Take 25 mg by mouth       SUMAtriptan (IMITREX) 50 MG tablet Take 1 tablet (50 mg) by mouth as needed for migraine 18 tablet 4     tacrolimus (PROTOPIC) 0.1 % external ointment Apply topically 2 times daily. 60 g 2     topiramate (TOPAMAX) 50 MG tablet Take 1 tablet (50 mg) by mouth 2 times daily. 60 tablet 11     traZODone (DESYREL) 50 MG tablet At Bedtime       Current Facility-Administered Medications   Medication Dose Route Frequency Provider Last Rate Last Admin     botulinum toxin type A (DYSPORT) injection 300 Units  300 Units Intramuscular Q90 Days    250 Units at 04/25/25 1323      Past Medical History:   Patient Active Problem List   Diagnosis     Asthma     Condyloma acuminatum     H/O arthroscopic knee surgery     Impingement syndrome of right shoulder     Insomnia, idiopathic     Major depressive disorder, single episode, mild     Migraine variant     Congenital mitral valve prolapse      Nonspecific immunological findings     Osteoarthritis of multiple joints     Vertigo     Past Medical History:   Diagnosis Date     Arthritis        CC Quynh Ag MD  [address]  [place] [address] on close of this encounter.     Again, thank you for allowing me to participate in the care of your patient.      Sincerely,    Quynh Ag MD

## 2025-06-03 ENCOUNTER — PATIENT OUTREACH (OUTPATIENT)
Dept: CARE COORDINATION | Facility: CLINIC | Age: 60
End: 2025-06-03
Payer: COMMERCIAL

## 2025-06-03 ENCOUNTER — TELEPHONE (OUTPATIENT)
Dept: UROLOGY | Facility: CLINIC | Age: 60
End: 2025-06-03
Payer: COMMERCIAL

## 2025-06-03 NOTE — TELEPHONE ENCOUNTER
[facility] Call Center    Phone Message    May a detailed message be left on voicemail: yes     Reason for Call: Appointment Intake    Referring Provider Name: Pt has referral for Zoon's with decades of friable tissue at urethra opening. This is not listed in protocols. Please advise and call pt to schedule. Thanks   Diagnosis and/or Symptoms: Quynh Ag MD in [facility] DERMATOLOGY    Action Taken: Other: uro    Travel Screening: Not Applicable     Date of Service:

## 2025-06-05 ENCOUNTER — PATIENT OUTREACH (OUTPATIENT)
Dept: CARE COORDINATION | Facility: CLINIC | Age: 60
End: 2025-06-05
Payer: COMMERCIAL

## 2025-06-05 NOTE — TELEPHONE ENCOUNTER
Patient confirmed scheduled appointment:  Date: 7/23  Time: 11;30am  Visit type: new female pelvic  Provider: Dr. Matthews  Location: [facility] pt preference  Testing/imaging: N/a  Additional notes:

## 2025-07-02 ENCOUNTER — HOSPITAL ENCOUNTER (OUTPATIENT)
Dept: MAMMOGRAPHY | Facility: CLINIC | Age: 60
Discharge: HOME | End: 2025-07-02
Payer: COMMERCIAL

## 2025-07-02 DIAGNOSIS — Z12.31 VISIT FOR SCREENING MAMMOGRAM: ICD-10-CM

## 2025-07-02 PROCEDURE — 77063 BREAST TOMOSYNTHESIS BI: CPT

## 2025-07-03 ENCOUNTER — VIRTUAL VISIT (OUTPATIENT)
Dept: NEUROLOGY | Facility: CLINIC | Age: 60
End: 2025-07-03
Payer: COMMERCIAL

## 2025-07-03 VITALS — HEIGHT: 64 IN | WEIGHT: 142 LBS | BODY MASS INDEX: 24.24 KG/M2

## 2025-07-03 DIAGNOSIS — G43.711 INTRACTABLE CHRONIC MIGRAINE WITHOUT AURA AND WITH STATUS MIGRAINOSUS: ICD-10-CM

## 2025-07-03 DIAGNOSIS — G54.0 TOS (THORACIC OUTLET SYNDROME): Primary | ICD-10-CM

## 2025-07-03 DIAGNOSIS — I87.1 COMPRESSION OF VEIN: ICD-10-CM

## 2025-07-03 RX ORDER — DIPHENHYDRAMINE HCL 25 MG
CAPSULE ORAL
Qty: 10 CAPSULE | Refills: 3 | Status: SHIPPED | OUTPATIENT
Start: 2025-07-03

## 2025-07-03 RX ORDER — KETOROLAC TROMETHAMINE 10 MG/1
TABLET, FILM COATED ORAL
Qty: 20 TABLET | Refills: 0 | Status: SHIPPED | OUTPATIENT
Start: 2025-07-03

## 2025-07-03 RX ORDER — METOCLOPRAMIDE 10 MG/1
TABLET ORAL
Qty: 10 TABLET | Refills: 4 | Status: SHIPPED | OUTPATIENT
Start: 2025-07-03

## 2025-07-03 RX ORDER — RIZATRIPTAN BENZOATE 10 MG/1
10 TABLET, ORALLY DISINTEGRATING ORAL
Qty: 12 TABLET | Refills: 3 | Status: SHIPPED | OUTPATIENT
Start: 2025-07-03

## 2025-07-03 ASSESSMENT — PAIN SCALES - GENERAL: PAINLEVEL_OUTOF10: NO PAIN (0)

## 2025-07-03 NOTE — NURSING NOTE
Current patient location: [address] [place] [address] [address] [place] [address]    Is the patient currently in the state of [place]? YES    Visit mode: VIDEO    If the visit is dropped, the patient can be reconnected by:VIDEO VISIT: Text to cell phone:   Telephone Information:   Mobile [phone]       Will anyone else be joining the visit? NO  (If patient encounters technical issues they should call [phone]    Are changes needed to the allergy or medication list? No    Are refills needed on medications prescribed by this physician? NO    Rooming Documentation:  Questionnaire(s) not pre-assigned    Reason for visit: Follow Up    Charissa ARANGO

## 2025-07-03 NOTE — LETTER
"7/3/2025       RE: Oral Singh  [address] [place] [address]     Dear Colleague,    Thank you for referring your patient, Oral Singh, to the [facility] [place] at [facility]. Please see a copy of my visit note below.    The patient is being evaluated via a billable video visit.    How would you like to obtain your AVS? MyChart  If the video visit is dropped, the invitation should be resent by: Send to e-mail at: [email or web address]  Will anyone else be joining your video visit? No      Video-Visit Details  Type of service:  Video Visit  Video Start Time:3:04 PM  Video End Time:3:36 PM  Originating Location (pt. 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  She also has chronic daily pressure headaches and neck pain.  She has chronic bilateral upper extremity weakness. There is some chronic swelling in the hands but also in the feet more recently.  Paresthesias in the hand and the headache have been worse on the right side by history but on exam today paresthesias are worse in the left hand.  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      She continues to feel that the arms are more tired than the legs. Dizziness is worse with flexion. Tinnitus is worse with head extension. Headache is worse with extension. There is no throat pressure. No problems swallowing.      Ultrasound on 4-13-21 showed " elevated right internal jugular vein velocities compared to the left but no areas of obstruction. CT venogram on 7-19-21 showed elongated styloid processes at 3.0/3.1cm bilaterally      She still experiences pressure on the top of the head at least once a week that can last 12-24 hours. It is not worse on one side. She is having the lightheadedness episodes throughout the day. She can trigger several a day triggered by fast head movements. They are lasting about 30-minutes to get back to baseline. This is worse with bending the head forward. She also notes a chronic cough since the beginning of the year. She coughs throughout the day. She may wake up in the middle of the night with coughing. There have been no voice changes. No pressure in the throat. No pain when swallowing.      A trial of topiramate escalated to 100mg BID did not help, so she is down to 50mg BID. Symptoms did not get worse when she came down. She has not had physical therapy.     We discussed the 2 imaging studies and the relevance that they may have to her current symptoms.  I think it be worthwhile given there conservative nature to try a course of physical therapy addressing the thoracic outlet and sternocleidomastoid area.  We would also consider escalating her diagnostic testing with a referral to my colleagues in physical medicine and rehab for a muscle block.  She also notes that her lightheadedness is significantly worse with head flexion.  Given enough time past since her last CT scan she is agreeable to try to CT venogram in the head flexed position which I have ordered.  This may help us with choosing all the correct muscles to inject if she does end up being referred for a muscle block.     Plan:  1. Physical therapy for thoracic outlet as well sternocleidomastoid area  2. CT venogram in the neutral and head flexed positions.   3. Consider trial muscle block with PM&R.      Interim History 1-28-22:  CT venogram 10-9-21: Styloid  process measures 2.9 cm on the right.  Styloid process measures 3.5 cm on the left. Mild right and moderate left upper internal jugular venous narrowing with flexion.     She has had 8 sessions of PT through December 2021. She would better on the day of the session but she would revert to her baseline sessions the next day. She didn't see any sustained improvement in the headache or dizziness but she does still do some neck stretches at home because the neck feels better.      She still has a chronic daily pressure headaches mostly in the front of the head but can be at the base of the skull. There have been no spinning very episodes since our last visit. But she continues to experience the lightheadedness mostly in the upright position, sitting and standing but better laying down.      There is still arm weakness but not so much the tingling.   We discussed the plan going forward to escalate to trying muscle blocks and Botox. She is agreeable with this plan.      Interim History 5-27-22:  5-9-22: PROCEDURE NOTE: Anterior Scalene Muscle and Sternocleidomastoid Muscle (SCM) Injection Under Ultrasound Guidance.  Before the procedure, she reported a pain score of 6/10.   After the procedure, she reported a pain score of 6/10.      She was fine in the room, but once she got out into the aranda-way she became very faint which lasted until she sat down. It lasted for about 5-minutes. She was able to drive home. For the rest of the day she felt better than her baseline. The lightheadedness/disorientation/pressure decreased it from 7/10 to 3/10 after two hours and it was 4/10 at 4 hours. She was back at her baseline by the evening. The next morning was the same as other morning. There was no neck tenderness. She is having arm tingling in the right once in a while, not too bad.      Was seen at the [facility] for question of CSF leak. CSF cisternogram from 1-18-21 was reviewed which was negative for a leak. She  was a diagnosis of possible vestibular neuritis, vestibular migraine, and PPPD. Was referred to Jasmin Aceves in ENT with whom she has an appointment on 12-1-22.      She is still on topiramate 50mg BID. She is done with physical therapy. She is not taking sumatriptan usually, only for super lightheadedness. Her symptoms are particularly bad with head flexion triggering faintness. Head extension is okay. Also, liying on the right side will trigger faintness. There is no globus sensation. No throat pressure.      Plan:  1. Request Botox chronic migraine (failed topiramate and amitriptyline).   2. If insufficiently helpful, would request vascular surgery evaluation for potential left styloid resection.      Interim History 9-23-22:  9-19-22: Bilateral occipital nerve blocks, trigger point injections.  Botox was not approved by insurance.      She has been very dizzy this summer. She has a lot of pressure in the ears, especially the left ear. She went to an ENT and had an MRI yesterday. She was given a diagnosis of vestibular migraine. She has been recommended to have trigger point injections which she had 4 days ago. This has not helped. She has tried a gluten free diet.     She has had really bad headaches in August over her son's wedding. This lasted about 5 days.      She has had no days with no head pressure. There is pressure behind the eyes. She is continuing to feel very off balanced and lightheaded. The lightheadedness is better sitting. When she stands, the lightheadedness is worse. Head movement will make her more lightheaded. Moving the head to the right is worse than moving to the left. Looking down is worse than looking up. Looking down and to the right is the worst. She is better is lying down. It makes the head pressure better. She has not fainted.      The arms and hands both feel weak. The fingers can get tingling. There is no color change. There is no swelling. There is no problem with washing  hair.      She is taking topiramate 50mg BID. Of note she had a cisternogram at [place] on January 2021 that was negative and had a opening pressure of 13.5mg H20. The CSF was normal except for an elevated protein of 71.      Chronic daily migraines with intractable vestibular symptoms. Patient has extracranial venous compression, perhaps as a  of these symptoms.      Plan:   1. 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 Consider referral to [place] for [place]'s syndrome after vascular referral  3. Methazolamide titrated to 50mg BID. When at goal dose start tapering the topiramate 25mg by each week.  4. Approval for Botox for chronic migraine with PM&R after clarification that he has failed 2.5 years of amitriptyline and 2.5 years of topiramate)  5. Follow-up in November 18th at 4pm for a video visit     INTERIM HISTORY 11/18/2022:  Botox had been denied by her insurance company despite a [id number] review and several requests. She had already failed over 2-years of topiramate and amitriptyline.      She was able to see Dr. Cristian Guadarrama on 11-9-22 for consideration of whether jugular vein compression could be contributing to her symptoms. She is scheduled for a venogram on 12-9-22. The appointment with [facility] on 12-1-22 has been cancelled.      Her headache and vertigo remained severe with head pressure every day and extreme sensitivity to head movement. There is continued bilateral ear pressure and tinnitus in the right ear because left ear is deaf. The tinnitus can change with bending over or with head movements.      Methazolamide titrated to 50mg BID went fine. She has been able to taper off the topiramate. She does feel more cognitively more foggy even without an increase in head pressure. There is neck tightness and pain symmetric. There is sometimes pain behind the right ear, where the styloid is.      She has no other major events. No falls. No numbness, tingling, or weakness  in the hand.      PLAN:  1. Methazolamide 50mg BID tablets  2. Aware venogram for diagnosis of possible SCM syndrome     Interim History 4/14/2023:  12-9-22:  Venogram with Dr. Guadarrama: Normal     She still has daily dizziness and light headedness. She is seeing Duncan Peter for NUCCA treatment for C1 adjustment. She feels better right after the adjustment but it lasts maybe 24 hrs at best. It does help with both the headache and the lightheadedness. She was doing weekly for 4 weeks but now she is doing every other week. She last did PT at the end of 2021.      She is on methazolamide 50mg BID is the same as topiramate as 50mg BID. Both had the same efficacy for the headache but topiramate is much cheaper.     She has stopped working since our last appointment due to symptoms.  She was previously on amitriptyline and topiramate.   She does continues to have a lot of neck soreness, especially under the right ear.   Had an episode of severe rocking vertigo after driving in a car.      Plan:  1. Taper methazolamide and titrate up topiramate  2. Trial of Emgality for intractable migraines  3. 1mg of diazepam for flying, avoid alcohol.  4. Neck protection during flying  5. Continue maintenance NUCCA   6. Add posterior neck strengthening exercises     Interim History 10/27/2023:  Is on topiramate 50mg BID, which is better than the methazolamide in terms of side effects of nausea and fatigue.   The Emgality was taken for 2 rounds, no change in headaches but also no side effects. The headaches are better lying down.   She is continuing to do NUCCA, with Dr. Peter, helps for about 2 days. Once every 3 weeks.      She takes a sumatriptan when the headache or dizziness are very severe. Needs a new prescription     The vertigo is worse looking down and with increase activity.  No globus sensation. No pain with swallowing.  She has a hiatal hernia and GERD.     No pelvic pressure, rectal pressure, hematuria, feet swelling.    There is always neck soreness and especially in the back of the neck  During a shower after doing a lot of active work on the house, both hands were tingling.      Plan:  CTV abdomen and pelvis  Patient wants to stay on topiramate 50mg BID  Sumatriptan 50mg tablets refilled, limit to two days per week.   MRI C-spine, patient has some valium for the scan     Interim History 3/29/2024:  CTA/CTV ABDOMEN AND PELVIS 11/14/2023   IMPRESSION:  1. 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  2. May-Thurner anatomy not demonstrated.  3. Enlarged left uterine and ovarian veins. Correlate for pelvic  congestion symptoms.     MR CERVICAL SPINE W/O CONTRAST 11/14/2023  Impression:   Cervical spondylosis greatest at C5-C6 with moderate to severe right and moderate left neural foraminal stenosis and mild spinal canal stenosis.     Still topiramate 50mg BID.  Feels that the headache is the same overall, head pressure mostly. This is worse with activity, and bending over. There are no pelvic congestion. She is postmenopausal.   She is having the tinnitus and dizziness quite often, with activity or with the head down. When severe, she is at a 8/10. On the best days, she's a 2/10.  She is still going to NUCCA, q3 weeks, going longer makes the symptoms come back. She feels that this is very effective.      She continues to have neck stiffness.   1-7-21 had LP at [place] with OP 13.5cmH20.     Exam: Not able to rotate head all the way to 90 degrees. Gets to about 70 degrees. Has a forward head posture. The left shoulder is lower than the right. Chin is slightly turned to the left.      PLAN:  Continue topiramate 50mg BID  Continue NUCCA   Consider doing a pelvic venogram for the possibility of pelvic congestion syndrome---referral placed  Schedule for neurotoxin injection for cervical dystonia     Interim History 10/11/2024:  4-26-24  37.5 units per anterior scalene muscle on the bilateral side(s)  37.5 units per  "sternocleidomastoid muscle on the bilateral side(s)     8-2-24  50 units per anterior scalene muscle on the bilateral side(s)--0.8 ml total  50 units per sternocleidomastoid muscle on the bilateral side(s)--0.8 ml total     9-23-24 note:  \"I feel that they have helped when I turn my head to the right. I do not feel like I am going to fall asleep nor do I get the blurriness in my eyes.   As for the every day dizziness I have been having for the past 10 years, I do not feel this has shown any improvement.\"     Head felt a bit lighter on the higher dose. She would like to stay on the same dose for the next injection. There is some travel planned.  The head turning to the right is so much better because she doesn't experience a sudden sleepiness and the visual blurriness that happens with the head turning.  Turning to the head to the left was always okay.      The chronic daily head pressure has not changed. When she lies down, there is a pulsating in the head. In the morning, she feels okay but the pressure builds up around mid-day and gets worse as the day goes on. She does not have complete spinning vertigo. Bending over and coming back up can cause a feeling a presyncope/blackout feeling. She need sot do everything slowly. The worst head position now is head flexion (brushing teeth, eating, bending over);     She is still seeing NUCCA provider, still benefit, maintenance of every 4 weeks. Does not do well going to 6 weeks.   Not able to hold alignment that long.      CTV Head and Neck 10-9-2021       PLAN:  Consider venography to evaluation IJV compression at C1, induced by head flexion  Continue Topiramate 50mg BID  Continue NUCCA with Dr. Peter,  Next cervical dystonia injection on October 25  Follow-up 6 months     Interim History 4/18/2025:  10-25-24   50 units per anterior scalene muscle on the bilateral side(s)-0.8 ml total  50 units per sternocleidomastoid muscle on the bilateral side(s)-0.8 ml " total--divided into two injections of 25 units each.      1-31-25  75 units per anterior scalene muscle on the bilateral side(s)--0.6 ml each side  50 units per sternocleidomastoid muscle on the bilateral side(s)--0.4 ml each side  In January, woke up in the middle of the night with a feeling of the eyes jerking but no perception of vertigo. This happened 3 times going away with opening the eyes each time. This happened over a span of about 2 minutes. This happened on two different nights. Around 1-12 and around 1-19. This last happened in November 2019.      During the day, turning the head is okay but she tries to avoid rapid movements. She has more dizziness lying on the right side. She has had a NUCCA (Reconnection) adjustment lying on the right side 5 days ago. She had some lightheadedness after the adjustment for about 15-20 seconds. She is going every 4 weeks, does need to be adjusted each time. She does feel better after the adjustment. She had an episode of blacking out when raising her arms at a store after the adjustment. There was no numbness or tingling in the arms. In general, after an adjustment the head feels lighter and the head feels clearer.      There was some trouble with pill swallowing with the last injection but no difficulty with raising the head. No difficulty with food.   We raised the dose to the anterior scalene given the presyncope triggered by the arm elevation.      Cerebral venogram 1-30-25  IMPRESSION:  Cerebral venography demonstrates no venographic or manometric evidence of flow-limiting stenosis.      The last injection worked better. The higher dosage was better. No neck weakness.   PRO: Reduces the sleepiness with head turning to the right and lying on the right side. Has not had any more eyes jerking in the middle of the night. Head feels a bit lighter than with the lower dose.   CON: If she lies on the right side too long (minutes), this can trigger the sleepiness. She has to  sleep on the back.   She still has dizziness daily, triggered by too much physical activity. She is not sure that turning the head to the right is a trigger.   This is an off balance feeling.      Head pressure: Feeling is constant over the top of the head.   There is no change in the arm symptoms. There is no chest pain or fullness.   There is some tingling in the entire left inside and the sole. Noticed more sitting and when the feet are extended straight out.      PLAN:  1] Next injection 25. Will keep the same dose.   2] Lasix 10mg-20mg every 6 hrs when head pressure is escalated.   3] Consider upright MRV with head flexion  4] On next visit, check for peroneal neuropathy     Interim History 7/3/2025:  25  300 units of abobotulinum toxin was diluted to 20 units/mL of preservative free saline (300 units diluted in 2.4ml saline). The following muscles were injected:  75 units per anterior scalene muscle on the bilateral side(s)--0.6 ml each side  50 units per sternocleidomastoid muscle on the bilateral side(s)--0.4 ml each side divided into two injections.     Went to ED on 25 and 25 for exacerbation of headache and dizziness.   Had moved the lawn on a rider and plant some flowers, sitting on the ground squatting (couple hours). Was in the 70's temperature. Started to get some dizziness and headache. Took sumatriptan. Then, the headache and the dizziness got worse and the vomiting. Was given toradol/reglan/benadryl. This was helpful but she was sleepy and she slept for several more days.     Was doing some light household activities after that but had a recurrence on 25 morning. She usually lies on her back but she was sleeping on her left side, turned head to right when woken and the vertigo hit again. She tried the promethazine cream on her wrist. Took a sumatriptan and a sip of water. Then she threw up. Went to ER again.   Given the followin2025 0632 CDT dexAMETHasone 10 mg/mL  injection 10 mg 10 mg Intravenous Given   06/24/2025 0626 CDT diphenhydrAMINE (BENADRYL) 50 mg/mL injection 50 mg 50 mg Intravenous Given   06/24/2025 0624 CDT ketorolac (TORADOL) 15 mg/mL injection 15 mg 15 mg Intravenous Given   06/24/2025 0619 CDT metoclopramide 10 mg injection (REGLAN) 10 mg Intravenous Given   06/24/2025 0615 CDT NaCl 0.9% 1000 mL IV solution 1,000 mL 1,000 mL Intravenous New Bag     She is now back to her normal head pressure and dizziness.   Both cerebral venogram 1-30-25 and more dedicated IJV venogram 12-9-22 have been normal but her response to Dysport has been very clear.   She had no difficulty with the last Dysport injection.   There is no numbness, tingling, or swelling in the hands. No upper chest pain or upper suprascapular pain or swelling.       Current Outpatient Medications:      diazepam (VALIUM) 2 MG tablet, Take 0.5 tablets (1 mg) by mouth every 6 hours as needed for muscle spasms (for flying), Disp: 10 tablet, Rfl: 0     DULoxetine (CYMBALTA) 30 MG capsule, , Disp: , Rfl:      estradiol (VAGIFEM) 10 MCG TABS vaginal tablet, Place 10 mcg vaginally, Disp: , Rfl:      fish oil-omega-3 fatty acids 1000 MG capsule, Take 2 g by mouth, Disp: , Rfl:      furosemide (LASIX) 20 MG tablet, Take 1/2-1 tablet every 6 hours as needed for swelling. Avoid taking within 4 hours of bedtime., Disp: 60 tablet, Rfl: 3     losartan (COZAAR) 25 MG tablet, Take 25 mg by mouth, Disp: , Rfl:      SUMAtriptan (IMITREX) 50 MG tablet, Take 1 tablet (50 mg) by mouth as needed for migraine, Disp: 18 tablet, Rfl: 4     tacrolimus (PROTOPIC) 0.1 % external ointment, Apply topically 2 times daily., Disp: 60 g, Rfl: 2     topiramate (TOPAMAX) 50 MG tablet, Take 1 tablet (50 mg) by mouth 2 times daily., Disp: 60 tablet, Rfl: 11     traZODone (DESYREL) 50 MG tablet, At Bedtime, Disp: , Rfl:     Current Facility-Administered Medications:      botulinum toxin type A (DYSPORT) injection 300 Units, 300 Units,  Intramuscular, Q90 Days, , 250 Units at 04/25/25 1323    Upright MRV was not available.     PLAN:  Will update the [place] TOS/IJV as last one was 4-13-21.  Trial Aimovig 140mg/ml for breakthrough intractable   Convert to rizatriptan 10mg ODT+ 600mg ibuprofen  If breakthrough after the triptan+NSAID then do the migraine cocktail (dexamethason/ketorolac/metoclopramide/diphenhydramine)  If breakthrough from that, then go to the ER  Continue topiramate 50mg BID  Next injection 8-1-25      DATA:  I personally reviewed the following data.    Last brain imaging:  [facility] 3D Screening  Images were obtained from an external facility.  Click PACS Images   hyperlink to view images.  Textual results have been scanned into the   media tab.      The longitudinal plan of care for the diagnosis(es)/condition(s) as documented were addressed during this visit. Due to the added complexity in care, I will continue to support Rin in the subsequent management and with ongoing continuity of care.    44-minutes were spent in evaluation, counseling, and documentation on the date of service.      Again, thank you for allowing me to participate in the care of your patient.      Sincerely,    Toshia CAMARILLO Cha, MD

## 2025-07-03 NOTE — PROGRESS NOTES
"The patient is being evaluated via a billable video visit.    How would you like to obtain your AVS? MyChart  If the video visit is dropped, the invitation should be resent by: Send to e-mail at: [email or web address]  Will anyone else be joining your video visit? No      Video-Visit Details  Type of service:  Video Visit  Video Start Time:3:04 PM  Video End Time:3:36 PM  Originating Location (pt. 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  She also has chronic daily pressure headaches and neck pain.  She has chronic bilateral upper extremity weakness. There is some chronic swelling in the hands but also in the feet more recently.  Paresthesias in the hand and the headache have been worse on the right side by history but on exam today paresthesias are worse in the left hand.  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      She continues to feel that the arms are more tired than the legs. Dizziness is worse with flexion. Tinnitus is worse with head extension. Headache is worse with extension. There is no throat pressure. No problems swallowing.      Ultrasound on 4-13-21 showed elevated right internal jugular vein velocities compared to the left but no areas of obstruction. CT venogram on 7-19-21 showed elongated styloid processes at 3.0/3.1cm bilaterally      She still experiences pressure on the top of the head at least once a week that can last 12-24 hours. It is not worse on one side. She " is having the lightheadedness episodes throughout the day. She can trigger several a day triggered by fast head movements. They are lasting about 30-minutes to get back to baseline. This is worse with bending the head forward. She also notes a chronic cough since the beginning of the year. She coughs throughout the day. She may wake up in the middle of the night with coughing. There have been no voice changes. No pressure in the throat. No pain when swallowing.      A trial of topiramate escalated to 100mg BID did not help, so she is down to 50mg BID. Symptoms did not get worse when she came down. She has not had physical therapy.     We discussed the 2 imaging studies and the relevance that they may have to her current symptoms.  I think it be worthwhile given there conservative nature to try a course of physical therapy addressing the thoracic outlet and sternocleidomastoid area.  We would also consider escalating her diagnostic testing with a referral to my colleagues in physical medicine and rehab for a muscle block.  She also notes that her lightheadedness is significantly worse with head flexion.  Given enough time past since her last CT scan she is agreeable to try to CT venogram in the head flexed position which I have ordered.  This may help us with choosing all the correct muscles to inject if she does end up being referred for a muscle block.     Plan:  1. Physical therapy for thoracic outlet as well sternocleidomastoid area  2. CT venogram in the neutral and head flexed positions.   3. Consider trial muscle block with PM&R.      Interim History 1-28-22:  CT venogram 10-9-21: Styloid process measures 2.9 cm on the right.  Styloid process measures 3.5 cm on the left. Mild right and moderate left upper internal jugular venous narrowing with flexion.     She has had 8 sessions of PT through December 2021. She would better on the day of the session but she would revert to her baseline sessions the next day.  She didn't see any sustained improvement in the headache or dizziness but she does still do some neck stretches at home because the neck feels better.      She still has a chronic daily pressure headaches mostly in the front of the head but can be at the base of the skull. There have been no spinning very episodes since our last visit. But she continues to experience the lightheadedness mostly in the upright position, sitting and standing but better laying down.      There is still arm weakness but not so much the tingling.   We discussed the plan going forward to escalate to trying muscle blocks and Botox. She is agreeable with this plan.      Interim History 5-27-22:  5-9-22: PROCEDURE NOTE: Anterior Scalene Muscle and Sternocleidomastoid Muscle (SCM) Injection Under Ultrasound Guidance.  Before the procedure, she reported a pain score of 6/10.   After the procedure, she reported a pain score of 6/10.      She was fine in the room, but once she got out into the aranda-way she became very faint which lasted until she sat down. It lasted for about 5-minutes. She was able to drive home. For the rest of the day she felt better than her baseline. The lightheadedness/disorientation/pressure decreased it from 7/10 to 3/10 after two hours and it was 4/10 at 4 hours. She was back at her baseline by the evening. The next morning was the same as other morning. There was no neck tenderness. She is having arm tingling in the right once in a while, not too bad.      Was seen at the [facility] for question of CSF leak. CSF cisternogram from 1-18-21 was reviewed which was negative for a leak. She was a diagnosis of possible vestibular neuritis, vestibular migraine, and PPPD. Was referred to Jasmin Aceves in ENT with whom she has an appointment on 12-1-22.      She is still on topiramate 50mg BID. She is done with physical therapy. She is not taking sumatriptan usually, only for super lightheadedness. Her symptoms  are particularly bad with head flexion triggering faintness. Head extension is okay. Also, liying on the right side will trigger faintness. There is no globus sensation. No throat pressure.      Plan:  1. Request Botox chronic migraine (failed topiramate and amitriptyline).   2. If insufficiently helpful, would request vascular surgery evaluation for potential left styloid resection.      Interim History 9-23-22:  9-19-22: Bilateral occipital nerve blocks, trigger point injections.  Botox was not approved by insurance.      She has been very dizzy this summer. She has a lot of pressure in the ears, especially the left ear. She went to an ENT and had an MRI yesterday. She was given a diagnosis of vestibular migraine. She has been recommended to have trigger point injections which she had 4 days ago. This has not helped. She has tried a gluten free diet.     She has had really bad headaches in August over her son's wedding. This lasted about 5 days.      She has had no days with no head pressure. There is pressure behind the eyes. She is continuing to feel very off balanced and lightheaded. The lightheadedness is better sitting. When she stands, the lightheadedness is worse. Head movement will make her more lightheaded. Moving the head to the right is worse than moving to the left. Looking down is worse than looking up. Looking down and to the right is the worst. She is better is lying down. It makes the head pressure better. She has not fainted.      The arms and hands both feel weak. The fingers can get tingling. There is no color change. There is no swelling. There is no problem with washing hair.      She is taking topiramate 50mg BID. Of note she had a cisternogram at [place] on January 2021 that was negative and had a opening pressure of 13.5mg H20. The CSF was normal except for an elevated protein of 71.      Chronic daily migraines with intractable vestibular symptoms. Patient has extracranial venous compression,  perhaps as a  of these symptoms.      Plan:   1. 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 Consider referral to [facility] for [place]'s syndrome after vascular referral  3. Methazolamide titrated to 50mg BID. When at goal dose start tapering the topiramate 25mg by each week.  4. Approval for Botox for chronic migraine with PM&R after clarification that he has failed 2.5 years of amitriptyline and 2.5 years of topiramate)  5. Follow-up in November 18th at 4pm for a video visit     INTERIM HISTORY 11/18/2022:  Botox had been denied by her insurance company despite a [id number] review and several requests. She had already failed over 2-years of topiramate and amitriptyline.      She was able to see Dr. Cristian Guadarrama on 11-9-22 for consideration of whether jugular vein compression could be contributing to her symptoms. She is scheduled for a venogram on 12-9-22. The appointment with [facility] on 12-1-22 has been cancelled.      Her headache and vertigo remained severe with head pressure every day and extreme sensitivity to head movement. There is continued bilateral ear pressure and tinnitus in the right ear because left ear is deaf. The tinnitus can change with bending over or with head movements.      Methazolamide titrated to 50mg BID went fine. She has been able to taper off the topiramate. She does feel more cognitively more foggy even without an increase in head pressure. There is neck tightness and pain symmetric. There is sometimes pain behind the right ear, where the styloid is.      She has no other major events. No falls. No numbness, tingling, or weakness in the hand.      PLAN:  1. Methazolamide 50mg BID tablets  2. Aware venogram for diagnosis of possible SCM syndrome     Interim History 4/14/2023:  12-9-22:  Venogram with Dr. Guadarrama: Normal     She still has daily dizziness and light headedness. She is seeing Duncan Peter for NUCCA treatment for C1 adjustment. She feels better  right after the adjustment but it lasts maybe 24 hrs at best. It does help with both the headache and the lightheadedness. She was doing weekly for 4 weeks but now she is doing every other week. She last did PT at the end of 2021.      She is on methazolamide 50mg BID is the same as topiramate as 50mg BID. Both had the same efficacy for the headache but topiramate is much cheaper.     She has stopped working since our last appointment due to symptoms.  She was previously on amitriptyline and topiramate.   She does continues to have a lot of neck soreness, especially under the right ear.   Had an episode of severe rocking vertigo after driving in a car.      Plan:  1. Taper methazolamide and titrate up topiramate  2. Trial of Emgality for intractable migraines  3. 1mg of diazepam for flying, avoid alcohol.  4. Neck protection during flying  5. Continue maintenance NUCCA   6. Add posterior neck strengthening exercises     Interim History 10/27/2023:  Is on topiramate 50mg BID, which is better than the methazolamide in terms of side effects of nausea and fatigue.   The Emgality was taken for 2 rounds, no change in headaches but also no side effects. The headaches are better lying down.   She is continuing to do NUCCA, with Dr. Peter, helps for about 2 days. Once every 3 weeks.      She takes a sumatriptan when the headache or dizziness are very severe. Needs a new prescription     The vertigo is worse looking down and with increase activity.  No globus sensation. No pain with swallowing.  She has a hiatal hernia and GERD.     No pelvic pressure, rectal pressure, hematuria, feet swelling.   There is always neck soreness and especially in the back of the neck  During a shower after doing a lot of active work on the house, both hands were tingling.      Plan:  CTV abdomen and pelvis  Patient wants to stay on topiramate 50mg BID  Sumatriptan 50mg tablets refilled, limit to two days per week.   MRI C-spine, patient has  "some valium for the scan     Interim History 3/29/2024:  CTA/CTV ABDOMEN AND PELVIS 11/14/2023   IMPRESSION:  1. 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  2. May-Thurner anatomy not demonstrated.  3. Enlarged left uterine and ovarian veins. Correlate for pelvic  congestion symptoms.     MR CERVICAL SPINE W/O CONTRAST 11/14/2023  Impression:   Cervical spondylosis greatest at C5-C6 with moderate to severe right and moderate left neural foraminal stenosis and mild spinal canal stenosis.     Still topiramate 50mg BID.  Feels that the headache is the same overall, head pressure mostly. This is worse with activity, and bending over. There are no pelvic congestion. She is postmenopausal.   She is having the tinnitus and dizziness quite often, with activity or with the head down. When severe, she is at a 8/10. On the best days, she's a 2/10.  She is still going to NUCCA, q3 weeks, going longer makes the symptoms come back. She feels that this is very effective.      She continues to have neck stiffness.   1-7-21 had LP at [place] with OP 13.5cmH20.     Exam: Not able to rotate head all the way to 90 degrees. Gets to about 70 degrees. Has a forward head posture. The left shoulder is lower than the right. Chin is slightly turned to the left.      PLAN:  Continue topiramate 50mg BID  Continue NUCCA   Consider doing a pelvic venogram for the possibility of pelvic congestion syndrome---referral placed  Schedule for neurotoxin injection for cervical dystonia     Interim History 10/11/2024:  4-26-24  37.5 units per anterior scalene muscle on the bilateral side(s)  37.5 units per sternocleidomastoid muscle on the bilateral side(s)     8-2-24  50 units per anterior scalene muscle on the bilateral side(s)--0.8 ml total  50 units per sternocleidomastoid muscle on the bilateral side(s)--0.8 ml total     9-23-24 note:  \"I feel that they have helped when I turn my head to the right. I do not feel like I am going to fall asleep " "nor do I get the blurriness in my eyes.   As for the every day dizziness I have been having for the past 10 years, I do not feel this has shown any improvement.\"     Head felt a bit lighter on the higher dose. She would like to stay on the same dose for the next injection. There is some travel planned.  The head turning to the right is so much better because she doesn't experience a sudden sleepiness and the visual blurriness that happens with the head turning.  Turning to the head to the left was always okay.      The chronic daily head pressure has not changed. When she lies down, there is a pulsating in the head. In the morning, she feels okay but the pressure builds up around mid-day and gets worse as the day goes on. She does not have complete spinning vertigo. Bending over and coming back up can cause a feeling a presyncope/blackout feeling. She need sot do everything slowly. The worst head position now is head flexion (brushing teeth, eating, bending over);     She is still seeing NUCCA provider, still benefit, maintenance of every 4 weeks. Does not do well going to 6 weeks.   Not able to hold alignment that long.      CTV Head and Neck 10-9-2021       PLAN:  Consider venography to evaluation IJV compression at C1, induced by head flexion  Continue Topiramate 50mg BID  Continue NUCCA with Dr. Peter,  Next cervical dystonia injection on October 25  Follow-up 6 months     Interim History 4/18/2025:  10-25-24   50 units per anterior scalene muscle on the bilateral side(s)-0.8 ml total  50 units per sternocleidomastoid muscle on the bilateral side(s)-0.8 ml total--divided into two injections of 25 units each.      1-31-25  75 units per anterior scalene muscle on the bilateral side(s)--0.6 ml each side  50 units per sternocleidomastoid muscle on the bilateral side(s)--0.4 ml each side  In January, woke up in the middle of the night with a feeling of the eyes jerking but no perception of vertigo. This happened 3 " times going away with opening the eyes each time. This happened over a span of about 2 minutes. This happened on two different nights. Around 1-12 and around 1-19. This last happened in November 2019.      During the day, turning the head is okay but she tries to avoid rapid movements. She has more dizziness lying on the right side. She has had a NUCCA (Reconnection) adjustment lying on the right side 5 days ago. She had some lightheadedness after the adjustment for about 15-20 seconds. She is going every 4 weeks, does need to be adjusted each time. She does feel better after the adjustment. She had an episode of blacking out when raising her arms at a store after the adjustment. There was no numbness or tingling in the arms. In general, after an adjustment the head feels lighter and the head feels clearer.      There was some trouble with pill swallowing with the last injection but no difficulty with raising the head. No difficulty with food.   We raised the dose to the anterior scalene given the presyncope triggered by the arm elevation.      Cerebral venogram 1-30-25  IMPRESSION:  Cerebral venography demonstrates no venographic or manometric evidence of flow-limiting stenosis.      The last injection worked better. The higher dosage was better. No neck weakness.   PRO: Reduces the sleepiness with head turning to the right and lying on the right side. Has not had any more eyes jerking in the middle of the night. Head feels a bit lighter than with the lower dose.   CON: If she lies on the right side too long (minutes), this can trigger the sleepiness. She has to sleep on the back.   She still has dizziness daily, triggered by too much physical activity. She is not sure that turning the head to the right is a trigger.   This is an off balance feeling.      Head pressure: Feeling is constant over the top of the head.   There is no change in the arm symptoms. There is no chest pain or fullness.   There is some  tingling in the entire left inside and the sole. Noticed more sitting and when the feet are extended straight out.      PLAN:  1] Next injection 25. Will keep the same dose.   2] Lasix 10mg-20mg every 6 hrs when head pressure is escalated.   3] Consider upright MRV with head flexion  4] On next visit, check for peroneal neuropathy     Interim History 7/3/2025:  25  300 units of abobotulinum toxin was diluted to 20 units/mL of preservative free saline (300 units diluted in 2.4ml saline). The following muscles were injected:  75 units per anterior scalene muscle on the bilateral side(s)--0.6 ml each side  50 units per sternocleidomastoid muscle on the bilateral side(s)--0.4 ml each side divided into two injections.     Went to ED on 25 and 25 for exacerbation of headache and dizziness.   Had moved the lawn on a rider and plant some flowers, sitting on the ground squatting (couple hours). Was in the 70's temperature. Started to get some dizziness and headache. Took sumatriptan. Then, the headache and the dizziness got worse and the vomiting. Was given toradol/reglan/benadryl. This was helpful but she was sleepy and she slept for several more days.     Was doing some light household activities after that but had a recurrence on 25 morning. She usually lies on her back but she was sleeping on her left side, turned head to right when woken and the vertigo hit again. She tried the promethazine cream on her wrist. Took a sumatriptan and a sip of water. Then she threw up. Went to ER again.   Given the followin2025 0632 CDT dexAMETHasone 10 mg/mL injection 10 mg 10 mg Intravenous Given   2025 0626 CDT diphenhydrAMINE (BENADRYL) 50 mg/mL injection 50 mg 50 mg Intravenous Given   2025 0624 CDT ketorolac (TORADOL) 15 mg/mL injection 15 mg 15 mg Intravenous Given   2025 0619 CDT metoclopramide 10 mg injection (REGLAN) 10 mg Intravenous Given   2025 0615 CDT NaCl 0.9% 1000  mL IV solution 1,000 mL 1,000 mL Intravenous New Bag     She is now back to her normal head pressure and dizziness.   Both cerebral venogram 1-30-25 and more dedicated IJV venogram 12-9-22 have been normal but her response to Dysport has been very clear.   She had no difficulty with the last Dysport injection.   There is no numbness, tingling, or swelling in the hands. No upper chest pain or upper suprascapular pain or swelling.       Current Outpatient Medications:     diazepam (VALIUM) 2 MG tablet, Take 0.5 tablets (1 mg) by mouth every 6 hours as needed for muscle spasms (for flying), Disp: 10 tablet, Rfl: 0    DULoxetine (CYMBALTA) 30 MG capsule, , Disp: , Rfl:     estradiol (VAGIFEM) 10 MCG TABS vaginal tablet, Place 10 mcg vaginally, Disp: , Rfl:     fish oil-omega-3 fatty acids 1000 MG capsule, Take 2 g by mouth, Disp: , Rfl:     furosemide (LASIX) 20 MG tablet, Take 1/2-1 tablet every 6 hours as needed for swelling. Avoid taking within 4 hours of bedtime., Disp: 60 tablet, Rfl: 3    losartan (COZAAR) 25 MG tablet, Take 25 mg by mouth, Disp: , Rfl:     SUMAtriptan (IMITREX) 50 MG tablet, Take 1 tablet (50 mg) by mouth as needed for migraine, Disp: 18 tablet, Rfl: 4    tacrolimus (PROTOPIC) 0.1 % external ointment, Apply topically 2 times daily., Disp: 60 g, Rfl: 2    topiramate (TOPAMAX) 50 MG tablet, Take 1 tablet (50 mg) by mouth 2 times daily., Disp: 60 tablet, Rfl: 11    traZODone (DESYREL) 50 MG tablet, At Bedtime, Disp: , Rfl:     Current Facility-Administered Medications:     botulinum toxin type A (DYSPORT) injection 300 Units, 300 Units, Intramuscular, Q90 Days, , 250 Units at 04/25/25 1323    Upright MRV was not available.     PLAN:  Will update the [place] TOS/IJV as last one was 4-13-21.  Trial Aimovig 140mg/ml for breakthrough intractable   Convert to rizatriptan 10mg ODT+ 600mg ibuprofen  If breakthrough after the triptan+NSAID then do the migraine cocktail  (dexamethason/ketorolac/metoclopramide/diphenhydramine)  If breakthrough from that, then go to the ER  Continue topiramate 50mg BID  Next injection 8-1-25      DATA:  I personally reviewed the following data.    Last brain imaging:  [facility] 3D Screening  Images were obtained from an external facility.  Click PACS Images   hyperlink to view images.  Textual results have been scanned into the   media tab.      The longitudinal plan of care for the diagnosis(es)/condition(s) as documented were addressed during this visit. Due to the added complexity in care, I will continue to support Rin in the subsequent management and with ongoing continuity of care.    44-minutes were spent in evaluation, counseling, and documentation on the date of service.

## 2025-07-03 NOTE — PATIENT INSTRUCTIONS
Please call [phone] to schedule the ultrasound at the [facility] ([facility] on [address] [place] [address], [place]. [address]). The Radiology department is on the 1st floor of the [facility]. This is the same building where we met for our appointment. Please note that these tests can only be done at the [facility] because they involve some extra neck positions that are not examined at the other imaging centers.

## 2025-07-07 DIAGNOSIS — G43.711 INTRACTABLE CHRONIC MIGRAINE WITHOUT AURA AND WITH STATUS MIGRAINOSUS: ICD-10-CM

## 2025-07-07 RX ORDER — DEXAMETHASONE 4 MG/1
10 TABLET ORAL PRN
Qty: 25 TABLET | Refills: 0 | Status: SHIPPED | OUTPATIENT
Start: 2025-07-07

## 2025-07-14 DIAGNOSIS — G43.711 INTRACTABLE CHRONIC MIGRAINE WITHOUT AURA AND WITH STATUS MIGRAINOSUS: ICD-10-CM

## 2025-07-14 RX ORDER — KETOROLAC TROMETHAMINE 10 MG/1
TABLET, FILM COATED ORAL
Qty: 20 TABLET | Refills: 0 | Status: SHIPPED | OUTPATIENT
Start: 2025-07-14

## 2025-07-14 RX ORDER — KETOROLAC TROMETHAMINE 10 MG/1
TABLET, FILM COATED ORAL
Qty: 20 TABLET | Refills: 0 | Status: SHIPPED | OUTPATIENT
Start: 2025-07-14 | End: 2025-07-14

## 2025-07-14 NOTE — TELEPHONE ENCOUNTER
"Neuroscience Clinic Task Note    TASK    Neurology Rx Refill  Medication ketorolac (TORADOL) 10 MG tablet    Dose 1 tablet onset of intractable migraine with migraine cocktail.    Last refill ordered (m/d/y) 07/14/2025   Last quantity ordered 20   Last # refills 0   Last clinic visit with ordering provider (m/d/y)    Next clinic visit with ordering provider (m/d/y)    All pertinent protocol data (lab date/result)    Pertinent information from patient's message        FOLLOW-UP      ADDITIONAL COMMENTS  Pharmacy requesting clarification: \"How many times per day can patient take one tablet? Please clarify frequency, thanks!\"    Khloe Judd    "

## 2025-07-15 DIAGNOSIS — G43.711 INTRACTABLE CHRONIC MIGRAINE WITHOUT AURA AND WITH STATUS MIGRAINOSUS: Primary | ICD-10-CM

## 2025-07-15 RX ORDER — METHYLPREDNISOLONE 4 MG/1
TABLET ORAL
Qty: 21 TABLET | Refills: 3 | Status: SHIPPED | OUTPATIENT
Start: 2025-07-15

## 2025-07-23 ENCOUNTER — OFFICE VISIT (OUTPATIENT)
Dept: UROLOGY | Facility: CLINIC | Age: 60
End: 2025-07-23
Payer: COMMERCIAL

## 2025-07-23 VITALS
HEART RATE: 70 BPM | OXYGEN SATURATION: 97 % | BODY MASS INDEX: 24.41 KG/M2 | WEIGHT: 143 LBS | SYSTOLIC BLOOD PRESSURE: 104 MMHG | HEIGHT: 64 IN | DIASTOLIC BLOOD PRESSURE: 71 MMHG

## 2025-07-23 DIAGNOSIS — N34.2 URETHRITIS: Primary | ICD-10-CM

## 2025-07-23 DIAGNOSIS — N76.3 ZOON'S VULVITIS: ICD-10-CM

## 2025-07-23 DIAGNOSIS — N95.2 ATROPHIC VAGINITIS: ICD-10-CM

## 2025-07-23 PROCEDURE — 3074F SYST BP LT 130 MM HG: CPT | Performed by: UROLOGY

## 2025-07-23 PROCEDURE — 1126F AMNT PAIN NOTED NONE PRSNT: CPT | Performed by: UROLOGY

## 2025-07-23 PROCEDURE — 99204 OFFICE O/P NEW MOD 45 MIN: CPT | Performed by: UROLOGY

## 2025-07-23 PROCEDURE — 3078F DIAST BP <80 MM HG: CPT | Performed by: UROLOGY

## 2025-07-23 RX ORDER — ESTRADIOL 0.1 MG/G
1 CREAM VAGINAL
Qty: 42.5 G | Refills: 11 | Status: SHIPPED | OUTPATIENT
Start: 2025-07-23

## 2025-07-23 ASSESSMENT — PAIN SCALES - GENERAL: PAINLEVEL_OUTOF10: NO PAIN (0)

## 2025-08-01 ENCOUNTER — ANCILLARY PROCEDURE (OUTPATIENT)
Dept: ULTRASOUND IMAGING | Facility: CLINIC | Age: 60
End: 2025-08-01
Attending: PSYCHIATRY & NEUROLOGY
Payer: COMMERCIAL

## 2025-08-01 DIAGNOSIS — I87.1 COMPRESSION OF VEIN: ICD-10-CM

## 2025-08-01 DIAGNOSIS — G54.0 TOS (THORACIC OUTLET SYNDROME): ICD-10-CM

## 2025-08-01 PROCEDURE — 93970 EXTREMITY STUDY: CPT | Performed by: RADIOLOGY

## 2025-08-01 PROCEDURE — 93922 UPR/L XTREMITY ART 2 LEVELS: CPT | Performed by: RADIOLOGY

## 2025-08-04 ENCOUNTER — TELEPHONE (OUTPATIENT)
Dept: NEUROLOGY | Facility: CLINIC | Age: 60
End: 2025-08-04
Payer: COMMERCIAL

## 2025-08-07 DIAGNOSIS — R55 POSTURAL DIZZINESS WITH PRESYNCOPE: Primary | ICD-10-CM

## 2025-08-07 DIAGNOSIS — R42 POSTURAL DIZZINESS WITH PRESYNCOPE: Primary | ICD-10-CM

## 2025-08-07 DIAGNOSIS — I50.812 CHRONIC RIGHT-SIDED HEART FAILURE (H): ICD-10-CM

## 2025-08-11 ENCOUNTER — HOSPITAL ENCOUNTER (OUTPATIENT)
Dept: CARDIOLOGY | Facility: CLINIC | Age: 60
Discharge: HOME | End: 2025-08-11
Attending: PSYCHIATRY & NEUROLOGY | Admitting: PSYCHIATRY & NEUROLOGY
Payer: COMMERCIAL

## 2025-08-11 DIAGNOSIS — R55 POSTURAL DIZZINESS WITH PRESYNCOPE: ICD-10-CM

## 2025-08-11 DIAGNOSIS — I50.812 CHRONIC RIGHT-SIDED HEART FAILURE (H): ICD-10-CM

## 2025-08-11 DIAGNOSIS — R42 POSTURAL DIZZINESS WITH PRESYNCOPE: ICD-10-CM

## 2025-08-11 LAB — LVEF ECHO: NORMAL

## 2025-08-11 PROCEDURE — 93306 TTE W/DOPPLER COMPLETE: CPT | Mod: 26 | Performed by: INTERNAL MEDICINE

## 2025-08-11 PROCEDURE — 999N000208 ECHOCARDIOGRAM COMPLETE

## 2025-08-12 DIAGNOSIS — G43.711 INTRACTABLE CHRONIC MIGRAINE WITHOUT AURA AND WITH STATUS MIGRAINOSUS: Primary | ICD-10-CM

## 2025-08-12 DIAGNOSIS — G93.2 INTRACRANIAL PRESSURE INCREASED: ICD-10-CM

## 2025-08-28 ENCOUNTER — HOSPITAL ENCOUNTER (OUTPATIENT)
Facility: CLINIC | Age: 60
Discharge: HOME | End: 2025-08-28
Attending: PSYCHIATRY & NEUROLOGY | Admitting: PSYCHIATRY & NEUROLOGY
Payer: COMMERCIAL

## 2025-08-28 ENCOUNTER — APPOINTMENT (OUTPATIENT)
Dept: MEDSURG UNIT | Facility: CLINIC | Age: 60
End: 2025-08-28
Attending: PSYCHIATRY & NEUROLOGY
Payer: COMMERCIAL

## 2025-08-28 PROCEDURE — 999N000142 HC STATISTIC PROCEDURE PREP ONLY

## 2025-08-28 PROCEDURE — 999N000132 HC STATISTIC PP CARE STAGE 1

## 2025-08-28 ASSESSMENT — ACTIVITIES OF DAILY LIVING (ADL)
ADLS_ACUITY_SCORE: 41

## (undated) RX ORDER — LIDOCAINE HYDROCHLORIDE 20 MG/ML
INJECTION, SOLUTION INFILTRATION; PERINEURAL
Status: DISPENSED
Start: 2022-05-09